# Patient Record
Sex: MALE | Race: WHITE | Employment: OTHER | URBAN - METROPOLITAN AREA
[De-identification: names, ages, dates, MRNs, and addresses within clinical notes are randomized per-mention and may not be internally consistent; named-entity substitution may affect disease eponyms.]

---

## 2017-01-03 ENCOUNTER — OFFICE VISIT (OUTPATIENT)
Dept: FAMILY MEDICINE CLINIC | Age: 67
End: 2017-01-03

## 2017-01-03 VITALS
HEART RATE: 55 BPM | OXYGEN SATURATION: 94 % | SYSTOLIC BLOOD PRESSURE: 127 MMHG | TEMPERATURE: 98 F | WEIGHT: 264 LBS | RESPIRATION RATE: 16 BRPM | HEIGHT: 75 IN | BODY MASS INDEX: 32.83 KG/M2 | DIASTOLIC BLOOD PRESSURE: 78 MMHG

## 2017-01-03 DIAGNOSIS — J06.9 ACUTE URI: Primary | ICD-10-CM

## 2017-01-03 RX ORDER — BENZONATATE 200 MG/1
200 CAPSULE ORAL
Qty: 20 CAP | Refills: 0 | Status: SHIPPED | OUTPATIENT
Start: 2017-01-03 | End: 2017-01-03 | Stop reason: SDUPTHER

## 2017-01-03 RX ORDER — AMOXICILLIN AND CLAVULANATE POTASSIUM 875; 125 MG/1; MG/1
1 TABLET, FILM COATED ORAL 2 TIMES DAILY
Qty: 14 TAB | Refills: 0 | Status: SHIPPED | OUTPATIENT
Start: 2017-01-03 | End: 2017-01-03 | Stop reason: SDUPTHER

## 2017-01-03 RX ORDER — AMOXICILLIN AND CLAVULANATE POTASSIUM 875; 125 MG/1; MG/1
1 TABLET, FILM COATED ORAL 2 TIMES DAILY
Qty: 20 TAB | Refills: 0 | Status: SHIPPED | OUTPATIENT
Start: 2017-01-03 | End: 2017-01-13

## 2017-01-03 RX ORDER — BENZONATATE 200 MG/1
200 CAPSULE ORAL
Qty: 20 CAP | Refills: 0 | Status: SHIPPED | OUTPATIENT
Start: 2017-01-03 | End: 2017-01-16 | Stop reason: ALTCHOICE

## 2017-01-03 NOTE — PROGRESS NOTES
HISTORY OF PRESENT ILLNESS  Nedra Morales is a 74 y.o. male. HPI  C/o head congestion/pressure, postnasal drip,  slight sore throat, started over a week ago, took zyrtec otc, not any better  Review of Systems   Constitutional: Negative for chills and fever. HENT: Negative for ear pain. Respiratory: Positive for cough. Negative for sputum production, shortness of breath and wheezing. Cardiovascular: Negative for chest pain. Neurological: Positive for headaches. Physical Exam   Constitutional: He appears well-developed and well-nourished. HENT:   Right Ear: Tympanic membrane and external ear normal.   Left Ear: Tympanic membrane and external ear normal.   Nose: Mucosal edema present. Right sinus exhibits maxillary sinus tenderness. Left sinus exhibits maxillary sinus tenderness. Mouth/Throat: Posterior oropharyngeal erythema present. No oropharyngeal exudate. Neck: Neck supple. Cardiovascular: Normal rate, regular rhythm and normal heart sounds. Pulmonary/Chest: Effort normal and breath sounds normal. He has no rales. Lymphadenopathy:     He has no cervical adenopathy. Vitals reviewed. ASSESSMENT and PLAN  Micah Oneill was seen today for cough and chest congestion. Diagnoses and all orders for this visit:    Acute URI with sinusitis  -     amoxicillin-clavulanate (AUGMENTIN) 875-125 mg per tablet; Take 1 Tab by mouth two (2) times a day for 10 days. -     benzonatate (TESSALON) 200 mg capsule; Take 1 Cap by mouth three (3) times daily as needed for Cough.   - continue zyrtec 10 mg po daily

## 2017-01-03 NOTE — PROGRESS NOTES
Chief Complaint   Patient presents with    Cough    Chest Congestion       1. Have you been to the ER, urgent care clinic since your last visit? Hospitalized since your last visit? No    2. Have you seen or consulted any other health care providers outside of the [address] [place] [address] since your last visit? Include any pap smears or colon screening.  Yes When: 12/7/16 Podiatry

## 2017-01-10 ENCOUNTER — TELEPHONE (OUTPATIENT)
Dept: FAMILY MEDICINE CLINIC | Age: 67
End: 2017-01-10

## 2017-01-10 NOTE — TELEPHONE ENCOUNTER
Terri Brennan with 21  called [phone] stating they received last office visit note from July 2016, requesting updated note. Advised pt has been seen for sick visit, but not follow-up.  Fer stated they will call pt to scheduled f/u with PCP

## 2017-01-16 ENCOUNTER — HOSPITAL ENCOUNTER (OUTPATIENT)
Dept: LAB | Age: 67
Discharge: HOME | End: 2017-01-16
Payer: MEDICARE

## 2017-01-16 ENCOUNTER — OFFICE VISIT (OUTPATIENT)
Dept: FAMILY MEDICINE CLINIC | Age: 67
End: 2017-01-16

## 2017-01-16 VITALS
RESPIRATION RATE: 16 BRPM | TEMPERATURE: 97.3 F | WEIGHT: 264 LBS | BODY MASS INDEX: 32.83 KG/M2 | HEIGHT: 75 IN | DIASTOLIC BLOOD PRESSURE: 77 MMHG | HEART RATE: 55 BPM | OXYGEN SATURATION: 96 % | SYSTOLIC BLOOD PRESSURE: 125 MMHG

## 2017-01-16 DIAGNOSIS — Z51.81 MEDICATION MONITORING ENCOUNTER: ICD-10-CM

## 2017-01-16 DIAGNOSIS — E11.42 DIABETIC POLYNEUROPATHY ASSOCIATED WITH TYPE 2 DIABETES MELLITUS (HCC): ICD-10-CM

## 2017-01-16 DIAGNOSIS — G47.33 OSA (OBSTRUCTIVE SLEEP APNEA): Chronic | ICD-10-CM

## 2017-01-16 DIAGNOSIS — E78.00 HYPERCHOLESTEROLEMIA: Chronic | ICD-10-CM

## 2017-01-16 DIAGNOSIS — E11.9 NON-INSULIN DEPENDENT TYPE 2 DIABETES MELLITUS (HCC): Chronic | ICD-10-CM

## 2017-01-16 DIAGNOSIS — E11.9 NON-INSULIN DEPENDENT TYPE 2 DIABETES MELLITUS (HCC): Primary | Chronic | ICD-10-CM

## 2017-01-16 DIAGNOSIS — I10 ESSENTIAL HYPERTENSION: Chronic | ICD-10-CM

## 2017-01-16 DIAGNOSIS — R94.4 DECREASED GFR: ICD-10-CM

## 2017-01-16 LAB
ALBUMIN SERPL BCP-MCNC: 4 G/DL (ref 3.4–5)
ALBUMIN/GLOB SERPL: 1.3 {RATIO} (ref 0.8–1.7)
ALP SERPL-CCNC: 79 U/L (ref 45–117)
ALT SERPL-CCNC: 24 U/L (ref 16–61)
ANION GAP BLD CALC-SCNC: 8 MMOL/L (ref 3–18)
APPEARANCE UR: CLEAR
AST SERPL W P-5'-P-CCNC: 13 U/L (ref 15–37)
BILIRUB SERPL-MCNC: 0.6 MG/DL (ref 0.2–1)
BILIRUB UR QL: NEGATIVE
BUN SERPL-MCNC: 17 MG/DL (ref 7–18)
BUN/CREAT SERPL: 16 (ref 12–20)
CALCIUM SERPL-MCNC: 9.1 MG/DL (ref 8.5–10.1)
CHLORIDE SERPL-SCNC: 101 MMOL/L (ref 100–108)
CO2 SERPL-SCNC: 29 MMOL/L (ref 21–32)
COLOR UR: YELLOW
CREAT SERPL-MCNC: 1.06 MG/DL (ref 0.6–1.3)
ERYTHROCYTE [DISTWIDTH] IN BLOOD BY AUTOMATED COUNT: 13.7 % (ref 11.6–14.5)
EST. AVERAGE GLUCOSE BLD GHB EST-MCNC: 163 MG/DL
GLOBULIN SER CALC-MCNC: 3.2 G/DL (ref 2–4)
GLUCOSE SERPL-MCNC: 69 MG/DL (ref 74–99)
GLUCOSE UR STRIP.AUTO-MCNC: NEGATIVE MG/DL
HBA1C MFR BLD: 7.3 % (ref 4.2–5.6)
HCT VFR BLD AUTO: 42.3 % (ref 36–48)
HGB BLD-MCNC: 14.3 G/DL (ref 13–16)
HGB UR QL STRIP: NEGATIVE
KETONES UR QL STRIP.AUTO: NEGATIVE MG/DL
LEUKOCYTE ESTERASE UR QL STRIP.AUTO: NEGATIVE
MCH RBC QN AUTO: 30.6 PG (ref 24–34)
MCHC RBC AUTO-ENTMCNC: 33.8 G/DL (ref 31–37)
MCV RBC AUTO: 90.4 FL (ref 74–97)
NITRITE UR QL STRIP.AUTO: NEGATIVE
PH UR STRIP: 5 [PH] (ref 5–8)
PLATELET # BLD AUTO: 307 K/UL (ref 135–420)
PMV BLD AUTO: 9.8 FL (ref 9.2–11.8)
POTASSIUM SERPL-SCNC: 3.9 MMOL/L (ref 3.5–5.5)
PROT SERPL-MCNC: 7.2 G/DL (ref 6.4–8.2)
PROT UR STRIP-MCNC: NEGATIVE MG/DL
RBC # BLD AUTO: 4.68 M/UL (ref 4.7–5.5)
SODIUM SERPL-SCNC: 138 MMOL/L (ref 136–145)
SP GR UR REFRACTOMETRY: 1.02 (ref 1–1.03)
UROBILINOGEN UR QL STRIP.AUTO: 0.2 EU/DL (ref 0.2–1)
WBC # BLD AUTO: 9 K/UL (ref 4.6–13.2)

## 2017-01-16 PROCEDURE — 80053 COMPREHEN METABOLIC PANEL: CPT | Performed by: INTERNAL MEDICINE

## 2017-01-16 PROCEDURE — 36415 COLL VENOUS BLD VENIPUNCTURE: CPT | Performed by: INTERNAL MEDICINE

## 2017-01-16 PROCEDURE — 81003 URINALYSIS AUTO W/O SCOPE: CPT | Performed by: INTERNAL MEDICINE

## 2017-01-16 PROCEDURE — 83036 HEMOGLOBIN GLYCOSYLATED A1C: CPT | Performed by: INTERNAL MEDICINE

## 2017-01-16 PROCEDURE — 85027 COMPLETE CBC AUTOMATED: CPT | Performed by: INTERNAL MEDICINE

## 2017-01-16 PROCEDURE — 82043 UR ALBUMIN QUANTITATIVE: CPT | Performed by: INTERNAL MEDICINE

## 2017-01-16 NOTE — PATIENT INSTRUCTIONS
STAY UP TO DATE WITH YOUR PREVENTIVE HEALTH:     Please review the following recommendations and if you are not sure that your healthcare is up to date, ask your provider at your next scheduled office visit. -- Yearly preventive visits (sometimes called \"physicals\") are recommended for all adults. Medicare and Medicaid do not pay for physicals. Medicare covers a yearly wellness visit that differs in many ways from a traditional physical, but is an opportunity to make sure you are maximizing the preventive services that will be covered by Medicare. Each insurance carrier will have different regulations about what services may be covered, so be sure to talk with your insurance provider before scheduling a visit. -- Colon cancer screening is recommended for all patients 48 and older with either colonoscopy (interval will vary depending on results) or yearly stool testing.      -- Mammogram is recommended every 2 years for women ages 36 to 76. -- Pap smear is recommended every 3-5 years for women aged 24 to 72, unless your cervix has been surgically removed for benign reasons. -- Flu shot is recommended every fall for adults of all ages. -- Prevnar 15 is recommended at the age of 72 for all adults. -- Pneumovax is recommended one year after Prevnar 13 for all adults, but earlier if you have a history of chronic health problems (including diabetes and asthma) or smoking. -- Tetanus boosters are recommended every 10 years for adults of all ages. Medicare and Medicaid do not cover tetanus as a preventive booster, but will pay for patients to receive a booster in the event of certain injuries. INSTRUCTIONS AFTER YOUR VISIT TODAY 1/16/2017      -- Prevnar 13 script filled at [address]  (fasting means nothing by mouth except medications with water or black coffee for at least 10 hours) were ordered to be completed at your next earliest convenience.    -- Our lab does not require a scheduled appointment. You can return to the office during lab hours within the next 1-2 weeks to complete your fasting labs. -- Lab hours at [facility] are 7a-3:00pm Monday-Friday. Please check the holiday closures below to make sure the office is open on the day you plan to return. LAB RESULTS can be obtained by logging into your [employer] account. If you need assistance with accessing your account, you can call the [employer] [place], [place] [address] at #[phone]. TESTING RESULTS   -- Lab results may become available for your review on [employer] before your provider has an opportunity to write you a note about results. Review of routine lab results will generally be done in 10-14 days. Please save your inquiries about results until your provider has had time to review them. -- If you have testing done outside of the office, please call to let us know the date and location that your testing was completed. Results can often be obtained faster if an inquiry is run. MEDICATION REFILLS      -- Medication refills are to be requested during regular office hours. The after hours physician on call is not required to respond to calls about medication refills. It is your responsibility to keep track of when you may be running out of medication and to place refill requests at least 3 business days prior. -- Controlled substance refills (medications that require printed prescriptions for monitoring of use per [facility] guidelines) must be picked up in the office and per medical group policy will require a scheduled office visit with the provider. Calling the after hours answering service to request a controlled substance represents a violation of [facility] controlled substance policy. [facility]      Scheduling appointments can be done at the  or by calling [phone] and selecting option #1.       CLINICAL QUESTIONS     During office hours please call [phone] and select option #4 to speak with Dr. Sabina Epstein clinical staff. You may need to leave a voicemail (be sure to clearly state your first and last name and date of birth in the message) as clinical staff are generally involved in patient care during office hours. Clinical questions will need to be reviewed by clinical staff with appropriate training. Most medical questions, new medication requests, referral requests, and completion of medical or insurance forms require a scheduled office visit. After hours questions that cannot wait until the next business day will be directed to the on-call physician. Please save any non-urgent questions until the next business day. Refills will not be called in on evenings or weekends. HOLIDAY CLOSURES:     The office is closed on six major holidays each year:  New Year's Day, July 4th, Memorial Day, Labor Day, Thanksgiving, and [place] Day. Lab and X-ray services are not available on those days.

## 2017-01-16 NOTE — PROGRESS NOTES
1. Have you been to the ER, urgent care clinic since your last visit? Hospitalized since your last visit? No    2. Have you seen or consulted any other health care providers outside of the [address] [place] [address] since your last visit? Include any pap smears or colon screening.  No

## 2017-01-16 NOTE — MR AVS SNAPSHOT
Visit Information Date & Time Provider Department Dept. Phone Encounter #  
 1/16/2017  2:00 PM Vanita Morrow, [employer] [phone] [id number] Follow-up Instructions Return in about 6 months (around 7/16/2017) for 30 minute visit. Upcoming Health Maintenance Date Due DTaP/Tdap/Td series (1 - Tdap) 10/6/1969 Pneumococcal 65+ Low/Medium Risk (1 of 2 - PCV13) 10/6/2013 INFLUENZA AGE 9 TO ADULT 8/1/2016 MICROALBUMIN Q1 9/2/2016 EYE EXAM RETINAL OR DILATED Q1 9/25/2016 HEMOGLOBIN A1C Q6M 1/5/2017 MEDICARE YEARLY EXAM 4/2/2017 LIPID PANEL Q1 9/21/2017 GLAUCOMA SCREENING Q2Y 9/25/2017 FOOT EXAM Q1 12/14/2017 COLONOSCOPY 5/4/2025 Allergies as of 1/16/2017  Review Complete On: 1/16/2017 By: Vanita Morrow MD  
  
 Severity Noted Reaction Type Reactions Zocor [Simvastatin] Medium 11/01/2013   Side Effect Myalgia Tolerating high dose Atorvastatin Current Immunizations  Reviewed on 1/16/2017 Name Date Influenza Vaccine 9/2/2015  9:29 AM, 10/8/2014, 10/2/2013, 9/24/2012 Zoster Vaccine, Live 9/24/2012 Reviewed by Vanita Morrow MD on 1/16/2017 at  2:04 PM  
 Reviewed by Vanita Morrow MD on 1/16/2017 at  2:05 PM  
You Were Diagnosed With   
  
 Codes Comments Non-insulin dependent type 2 diabetes mellitus ([facility]    -  Primary ICD-10-CM: E11.9 ICD-9-CM: 250.00 Essential hypertension     ICD-10-CM: I10 
ICD-9-CM: 401.9 NELSON (obstructive sleep apnea)     ICD-10-CM: G47.33 
ICD-9-CM: 327.23 Hypercholesterolemia     ICD-10-CM: E78.00 ICD-9-CM: 272.0 Decreased GFR     ICD-10-CM: R94.4 ICD-9-CM: 794.4 Medication monitoring encounter     ICD-10-CM: Z51.81 
ICD-9-CM: V58.83  Diabetic polyneuropathy associated with type 2 diabetes mellitus ([facility] 75.)     ICD-10-CM: E11.42 
 ICD-9-CM: 250.60, 357.2 Uncontrolled type 2 diabetes mellitus without complication, without long-term current use of insulin ([facility] 75.)     ICD-10-CM: E11.65 ICD-9-CM: 250.02 Vitals BP Pulse Temp Resp Height(growth percentile) Weight(growth percentile) 125/77 (BP 1 Location: Right arm, BP Patient Position: Sitting) (!) 55 97.3 °F (36.3 °C) (Oral) 16 6' 3\" (1.905 m) 264 lb (119.7 kg) SpO2 BMI Smoking Status 96% 33 kg/m2 Former Smoker Vitals History BMI and BSA Data Body Mass Index Body Surface Area  
 33 kg/m 2 2.52 m 2 Preferred Pharmacy Pharmacy Name Phone [facility]ung [facility], [address] [phone] Your Updated Medication List  
  
   
This list is accurate as of: 1/16/17  2:18 PM.  Always use your most recent med list.  
  
  
  
  
 ALPRAZolam 0.25 mg tablet Commonly known as:  Justina Furnace Take 1 Tab by mouth three (3) times daily as needed for Anxiety. Max Daily Amount: 0.75 mg. Indications: ANXIETY  
  
 aspirin 81 mg chewable tablet Take 81 mg by Mouth Once a Day. atorvastatin 80 mg tablet Commonly known as:  LIPITOR Take 1 Tab by mouth daily. Take 80 mg by Mouth Once a Day. Blood-Glucose Meter Misc  
BG meter for monitoring BG - ICD 9 250.00 - Diabetes mellitus type 2 uncontrolled - BID testing. carboxymethylcellulose sodium 1 % Dlgl Apply  to eye. Diabetic Supplies, [facility]. Mis Commonly known as:  Lancing Device with Lancets DM2 uncontrolled - BG testing BID. fexofenadine-pseudoephedrine 180-240 mg per tablet Commonly known as:  ALLEGRA-D 24 HOUR Take 1 Tab by mouth daily. glipiZIDE 10 mg tablet Commonly known as:  Selelizabeth Bajwaler Take 1 Tab by mouth two (2) times a day. * glucose blood VI test strips strip Commonly known as:  blood glucose test  
Uncontrolled DM2. BID testing. * glucose blood VI test strips strip Commonly known as:  FREESTYLE LITE STRIPS  
ICD10 E11.65 - DM2 daily BG testing. Lancets Misc Uncontrolled DM2. BID testing. Disp 2 boxes of 100 each with 3 refills for 90 day supply. LEVITRA 10 mg tablet Generic drug:  vardenafil Take 10 mg by mouth as needed. meloxicam 7.5 mg tablet Commonly known as:  MOBIC Take 1 Tab by mouth daily as needed for Pain (arthritis). metFORMIN  mg tablet Commonly known as:  GLUCOPHAGE XR Take 1 Tab by mouth two (2) times a day. mometasone 50 mcg/actuation nasal spray Commonly known as:  NASONEX  
2 Sprays by Both Nostrils route daily. naproxen 500 mg tablet Commonly known as:  NAPROSYN Take 1 Tab by Mouth Twice Daily. olopatadine 0.1 % ophthalmic solution Commonly known as:  PATANOL Administer 2 Drops to both eyes two (2) times daily as needed for Allergies. omega-3 fatty acids-vitamin e 1,000 mg Cap Commonly known as:  FISH OIL Take 1 Cap by mouth two (2) times a day. omeprazole 20 mg capsule Commonly known as:  PRILOSEC Take 1 Cap by mouth daily. OTHER Heel cup  Indications: heel pain  
  
 oxyCODONE-acetaminophen 5-325 mg per tablet Commonly known as:  PERCOCET Take 1 Tab by mouth every six (6) hours as needed for Pain. Max Daily Amount: 4 Tabs. pneumococcal 13 yuan conj dip 0.5 mL Syrg injection Commonly known as:  PREVNAR-13  
0.5 mL by IntraMUSCular route once for 1 dose. pramipexole 0.5 mg tablet Commonly known as:  MIRAPEX Take 1 Tab by mouth three (3) times daily as needed. Indications: RESTLESS LEGS SYNDROME  
  
 telmisartan-hydroCHLOROthiazide 40-12.5 mg per tablet Commonly known as:  MICARDIS HCT Take 1 Tab by mouth daily. triamcinolone 0.5 % topical cream  
Commonly known as:  ARISTOCORT Apply  to affected area two (2) times a day. use thin layer to affected area * Notice:   This list has 2 medication(s) that are the same as other medications prescribed for you. Read the directions carefully, and ask your doctor or other care provider to review them with you. Prescriptions Printed Refills  
 pneumococcal 13 yuan conj dip (PREVNAR-13) 0.5 mL syrg injection 0 Si.5 mL by IntraMUSCular route once for 1 dose. Class: Print Route: IntraMUSCular  
 glucose blood VI test strips (FREESTYLE LITE STRIPS) strip 3 Sig: ICD10 E11.65 - DM2 daily BG testing. Class: Print We Performed the Following  DIABETES FOOT EXAM [HM7 Custom] Comments:  
 Diabetic foot exam performed today. REFERRAL TO OPHTHALMOLOGY [REF57 Custom] Comments:  
 Please evaluate patient for diabetic eye exam. ([facility] staff - place in folder for [facility] for Leon Martines) Follow-up Instructions Return in about 6 months (around 2017) for 30 minute visit. To-Do List   
 2017 Lab:  CBC W/O DIFF   
  
 2017 Lab:  HEMOGLOBIN A1C WITH EAG   
  
 2017 Lab:  METABOLIC PANEL, COMPREHENSIVE   
  
 2017 Lab:  MICROALBUMIN, UR, RAND W/ MICROALBUMIN/CREA RATIO   
  
 2017 Lab:  URINALYSIS W/ RFLX MICROSCOPIC Referral Information Referral ID Referred By Referred To  
  
 [id number] [facility], [address] [place] [address], [address] Phone: [phone] Fax: [phone] Visits Status Start Date End Date 1 New Request 17 If your referral has a status of pending review or denied, additional information will be sent to support the outcome of this decision. Patient Instructions STAY UP TO DATE WITH YOUR PREVENTIVE HEALTH:    
Please review the following recommendations and if you are not sure that your healthcare is up to date, ask your provider at your next scheduled office visit.   
 
-- Yearly preventive visits (sometimes called \"physicals\") are recommended for all adults. Medicare and Medicaid do not pay for physicals. Medicare covers a yearly wellness visit that differs in many ways from a traditional physical, but is an opportunity to make sure you are maximizing the preventive services that will be covered by Medicare. Each insurance carrier will have different regulations about what services may be covered, so be sure to talk with your insurance provider before scheduling a visit. -- Colon cancer screening is recommended for all patients 48 and older with either colonoscopy (interval will vary depending on results) or yearly stool testing.   
 
-- Mammogram is recommended every 2 years for women ages 36 to 76. -- Pap smear is recommended every 3-5 years for women aged 24 to 72, unless your cervix has been surgically removed for benign reasons. -- Flu shot is recommended every fall for adults of all ages. -- Prevnar 15 is recommended at the age of 72 for all adults. -- Pneumovax is recommended one year after Prevnar 13 for all adults, but earlier if you have a history of chronic health problems (including diabetes and asthma) or smoking. -- Tetanus boosters are recommended every 10 years for adults of all ages. Medicare and Medicaid do not cover tetanus as a preventive booster, but will pay for patients to receive a booster in the event of certain injuries. INSTRUCTIONS AFTER YOUR VISIT TODAY 1/16/2017   
 
-- Prevnar 13 script filled at pharmacy -- FASTING LABS  (fasting means nothing by mouth except medications with water or black coffee for at least 10 hours) were ordered to be completed at your next earliest convenience. -- Our lab does not require a scheduled appointment. You can return to the office during lab hours within the next 1-2 weeks to complete your fasting labs. -- Lab hours at [facility] are 7a-3:00pm Monday-Friday.   Please check the holiday closures below to make sure the office is open on the day you plan to return. LAB RESULTS can be obtained by logging into your [employer] account. If you need assistance with accessing your account, you can call the [address] [place] [address] at #[phone]. TESTING RESULTS  
-- Lab results may become available for your review on [employer] before your provider has an opportunity to write you a note about results. Review of routine lab results will generally be done in 10-14 days. Please save your inquiries about results until your provider has had time to review them. -- If you have testing done outside of the office, please call to let us know the date and location that your testing was completed. Results can often be obtained faster if an inquiry is run. MEDICATION REFILLS   
 
-- Medication refills are to be requested during regular office hours. The after hours physician on call is not required to respond to calls about medication refills. It is your responsibility to keep track of when you may be running out of medication and to place refill requests at least 3 business days prior. -- Controlled substance refills (medications that require printed prescriptions for monitoring of use per [facility] guidelines) must be picked up in the office and per medical group policy will require a scheduled office visit with the provider. Calling the after hours answering service to request a controlled substance represents a violation of [facility] controlled substance policy. [place] Scheduling appointments can be done at the  or by calling [phone] and selecting option #1. CLINICAL QUESTIONS During office hours please call [phone] and select option #4 to speak with Dr. Tonja Cabral clinical staff. You may need to leave a voicemail (be sure to clearly state your first and last name and date of birth in the message) as clinical staff are generally involved in patient care during office hours. Clinical questions will need to be reviewed by clinical staff with appropriate training. Most medical questions, new medication requests, referral requests, and completion of medical or insurance forms require a scheduled office visit. After hours questions that cannot wait until the next business day will be directed to the on-call physician. Please save any non-urgent questions until the next business day. Refills will not be called in on evenings or weekends. HOLIDAY CLOSURES:  
 
The office is closed on six major holidays each year:  New Year's Day, July 4th, Memorial Day, Labor Day, Thanksgiving, and [place] Day. Lab and X-ray services are not available on those days. Introducing [facility]! Cole Martínez introduces [employer] patient portal. Now you can access parts of your medical record, email your doctor's office, and request medication refills online. 1. In your internet browser, go to [email or web address] 2. Click on the First Time User? Click Here link in the Sign In box. You will see the New Member Sign Up page. 3. Enter your [employer] Access Code exactly as it appears below. You will not need to use this code after youve completed the sign-up process. If you do not sign up before the expiration date, you must request a new code. · [employer] Access Code: [email or web address] Expires: 4/16/2017  2:18 PM 
 
4. Enter the last four digits of your Social Security Number (xxxx) and Date of Birth (mm/dd/yyyy) as indicated and click Submit. You will be taken to the next sign-up page. 5. Create a [employer] ID. This will be your [employer] login ID and cannot be changed, so think of one that is secure and easy to remember. 6. Create a [employer] password. You can change your password at any time. 7. Enter your Password Reset Question and Answer. This can be used at a later time if you forget your password. 8. Enter your e-mail address. You will receive e-mail notification when new information is available in [address] 9. Click Sign Up. You can now view and download portions of your medical record. 10. Click the Download Summary menu link to download a portable copy of your medical information. If you have questions, please visit the Frequently Asked Questions section of the [employer] website. Remember, [employer] is NOT to be used for urgent needs. For medical emergencies, dial 911. Now available from your iPhone and Android! Please provide this summary of care documentation to your next provider. Your primary care clinician is listed as Sil Junior. If you have any questions after today's visit, please call [phone].

## 2017-01-16 NOTE — PROGRESS NOTES
PRE-VISIT PLANNING:       Najma De La Vega is a patient of Ya Mclain MD who is scheduled for an office visit with Ya Mclain MD  for follow up - [facility] requested most recent notes, unclear which product he is being evaluated for. Encounter opened prior to visit to complete pre-visit planning, update and review pertinent sections in the chart. Last office visit with PCP was 16. Seen by NP Ashley 16 for heel pain and Dr. Kian Durand 1/3/17 for sinusitis (placed on augmentin). Rooming Nurse Items:  -- Offer high dose flu shot  -- Release for last eye exam note (previously seen by Dr. Lotus Holcomb)    Pending items for provider to review with patient:  -- Prevnar 13 from pharmacy, PPSV23 1 year later  -- Due for labs and urine    Health Maintenance Due   Topic Date Due    DTaP/Tdap/Td series (1 - Tdap) 10/06/1969    Pneumococcal 65+ Low/Medium Risk (1 of 2 - PCV13) 10/06/2013    INFLUENZA AGE 9 TO ADULT  2016    MICROALBUMIN Q1  2016    EYE EXAM RETINAL OR DILATED Q1  2016    HEMOGLOBIN A1C Q6M  2017          Internal Medicine/Primary Care  Progress Note  [address] [place] [facility] [place] [address] [address], 8 [facility], [address] [place] [address]  Phone [phone]  Fax [phone]    Date of Service:  2017   Patient:  Najma De La Vega  Patient :  1950    History, Discussion & Plan     Chief Complaint   Patient presents with    Other     [facility] eval.        Najma De La Vega is a pleasant 74 y.o. male patient who was seen today for follow up visit. He  has a past medical history of Abrasion of skin (2016); Acid reflux; ACP (advance care planning); Aneurysm ([address] Aortic root dilation ([address] Arthritis; Avascular necrosis of bones of both hips ([address] (16); Avascular necrosis of femoral head ([address] (2015);  Back pain, lumbosacral; Back pain, thoracic; BMI 33.0-33.9,adult; BMI 33.0-33.9,adult; Bone lesion left femur; Carpal tunnel syndrome, bilateral; Cervical spondylosis; Chronic pain; Compression fracture of thoracic vertebra (HCC) (1/2016); DDD (degenerative disc disease), lumbosacral; Dental disorder; Diabetes ([address] Elevated transaminase level; Essential hypertension; GERD (gastroesophageal reflux disease); Herniated nucleus pulposus; History of poliomyelitis; History of shingles (7/2013); Hypercholesterolemia; Hypogonadism male; Impingement syndrome of left shoulder (8/2013); Internal hemorrhoids; Long term (current) use of anticoagulants; NELSON (obstructive sleep apnea); Restless leg syndrome; S/P AAA repair; Seasonal allergic reaction; and Type 2 diabetes mellitus, controlled ([address] He also has no past medical history of Anemia; Arrhythmia; Asthma; Autoimmune disease ([address] CAD (coronary artery disease); Calculus of kidney; Cancer ([address] Chronic kidney disease; Chronic obstructive pulmonary disease ([address] Congestive heart failure, unspecified; Depression; Headache; History of abuse; Liver disease; Psychotic disorder; PUD (peptic ulcer disease); Seizures ([address] Stool color black; Stroke ([address] Thromboembolus ([address] Thyroid disease; or Trauma. Today patient reports he saw Dr. Manav Pillai (podiatry) who ordered him orthotic inserts for plantar fasciitis and also ordered diabetic shoes for him. Left heel pain is much better after getting steroid injections and using the orthotic Dr. Manav Pillai recommended. Still hurts some, but he is no longer limping. Referred him to [facility], but insurance requires updated diabetic care plan, foot exam and testing. He is checking BGs 3-4 times a week most of the time, has been gone hunting quite a bit this fall and early winter. Not eating a lot of sweets, but may have been eating more fried foods. Usually his home BGs range 70s up to 180s. He sometimes does have some hypoglycemic symptoms and a few times his BG was down into mid-60s. Resolved with eating a piece of candy.       Cough and congestion have resolved. No longer taking tessalon. Still has some minor aches and pains that he associates with his fall through the ceiling a year ago. Very rarely takes percocet, perhaps 3-4 tablets total in the last 6+ months. He very occasionally has anxiety that occurs out of the blue, no identifiable trigger. He takes a 0.25 mg Xanax when this occurs. He states he still has most of his tablets left from a script given to him over 6 months ago.       Review of Systems (positives in bold)   CONST:   fevers, chills, rigors, malaise, night sweats, fatigue    unintentional weight change, appetite change  NEURO:   headaches, auras, vision changes, seizures,     dizziness, lightheadeness, orthostasis, loss of consciousness, confusion    numbness/tingling/sensory change, focal weakness, new gait difficulty/imbalance  HEENT:  itchy eyes, runny eyes, red eyes, eye watering or discharge,     vision changes, light sensitivity, double vision    ear pain, ear pressure/popping, ear discharge/drainage, hearing change    sneezing, runny nose, nasal congestion, postnasal drip,     nosebleeds, altered sense of smell    sore throat, dry mouth,voice change, hoarse voice,      jaw pain, jaw popping, toothache, dysgeusia    difficulty swallowing, oral ulcers or canker sores  CV:      chest pain, palpitations, chest wall pain, orthopnea, PND, edema  PULM:  SOB, wheezing, cough, sputum production, hemoptysis  GI:             nausea, vomiting, dry heaves, abdominal pain,     diarrhea, constipation, greasy stools, blood in stool, pale stools  MS:      focal muscle pain, myalgias, soft tissue swelling,    focal joint pain, diffuse joint aches, joint swelling/redness,     decreased ROM, joint instability, joint crepitus    neck pain, back pain  SKIN:        rashes, itching, vesicles, pustules, drainage, cuts or sores, nonhealing wounds,   acne, rosacea, new or changing skin growths, skin tags, warts  ALLERGY: seasonal allergies, itchy eyes, watery eyes, red eyes,     itchy ears, ear pain/pressure, ear popping,     runny nose, sneezing, postnasal drip, sore throat  HEME:  easy bleeding/bruising, bleeding from gums, history of DVT or PE, history of ICH  PSYCH: abnormal mood swings, anxiety, insomnia, hallucinations, anhedonia,       depression, suicidal ideation, homicidal ideation, feelings of hopelessness    substance dependence or abuse, disordered eating, irritability,    difficulty focusing, distractibility, obsessions, compulsions, repetitious behaviors     Body mass index is 33 kg/(m^2). Discussed the patient's above normal BMI with him. I have recommended the following interventions and follow up:  Weight loss from baseline weight. 30 minutes of cardiovascular exercise daily, reduction in simple carbohydrates, increase in dietary fiber, adequate water intake to stay hydrated/keep urine clear to light yellow. Follow up at next OV. Diagnoses & Orders:   Salome Roberson was seen today for follow up. Compliant with metformin  mg BID and Glipizide 10 mg BID. Labs today to assess diabetic control/medication monitoring. Foot exam demonstrates diabetic neuropathy. He is not fasting, will update lipids at next visit. Will forward notes and lab results (once available) to Dr. Elsie Jiménez and to [facility]. BP is well controlled. No change to medications. Follow up in 6 months for 30 min visit, sooner PRN. ICD-10-CM ICD-9-CM    1. Non-insulin dependent type 2 diabetes mellitus (HCC) E11.9 250.00 REFERRAL TO OPHTHALMOLOGY      HEMOGLOBIN A1C WITH EAG      METABOLIC PANEL, COMPREHENSIVE      CBC W/O DIFF      MICROALBUMIN, UR, RAND W/ MICROALBUMIN/CREA RATIO      URINALYSIS W/ RFLX MICROSCOPIC       DIABETES FOOT EXAM   2. Essential hypertension R93 072.0 METABOLIC PANEL, COMPREHENSIVE      CBC W/O DIFF      URINALYSIS W/ RFLX MICROSCOPIC   3.  NELSON (obstructive sleep apnea) U90.58 578.86 METABOLIC PANEL, COMPREHENSIVE      CBC W/O DIFF   4. Hypercholesterolemia S14.38 673.3 METABOLIC PANEL, COMPREHENSIVE   5. Decreased GFR M42.7 455.4 METABOLIC PANEL, COMPREHENSIVE      URINALYSIS W/ RFLX MICROSCOPIC   6. Medication monitoring encounter Z51.81 V58.83 REFERRAL TO OPHTHALMOLOGY      HEMOGLOBIN A1C WITH EAG      METABOLIC PANEL, COMPREHENSIVE      CBC W/O DIFF      MICROALBUMIN, UR, RAND W/ MICROALBUMIN/CREA RATIO      URINALYSIS W/ RFLX MICROSCOPIC   7. Diabetic polyneuropathy associated with type 2 diabetes mellitus (HCC) E11.42 250.60  DIABETES FOOT EXAM     357.2    8. Uncontrolled type 2 diabetes mellitus without complication, without long-term current use of insulin (HCC) E11.65 250.02        The patient states understanding/agreement with the treatment plan. All questions were answered. Jesika Silverio MD - Internal Medicine  1/16/2017, 9:02 AM    Patient Care Team:  Jesika Silverio MD as PCP - General (Internal Medicine)  Leighann Cates RN as Nurse Navigator  Marilou St MD as Consulting Provider (Orthopedic Surgery)  Reid Tobin MD (General Surgery)  Merilynn Opitz, MD (Vascular Surgery)  Frandy Singleton DO as Consulting Provider (Physical Medicine and Rehab)  Justine Rios MD as Consulting Provider (Neurosurgery)      Objective:       VS:    Visit Vitals    /77 (BP 1 Location: Right arm, BP Patient Position: Sitting)    Pulse (!) 55    Temp 97.3 °F (36.3 °C) (Oral)    Resp 16    Ht 6' 3\" (1.905 m)    Wt 264 lb (119.7 kg)    SpO2 96%    BMI 33 kg/m2       General:   Age appropriate male, seated comfortably in exam room chair    Appears well, well-nourished, well-groomed, conversant, alert, in no acute distress.      HEENT:  Normocephalic, atraumatic, MMM, PERRL, EOMI   Cardiovasc:   Regular rate and rhythm, no murmurs, no rubs, no gallops  Pulmonary:   Clear breath sounds bilaterally, good air movement,    No wheezing, no rales, no rhonchi, normal respiratory effort  Abdomen:   Abdomen soft, nontender, nondistended, NABS, no masses  Extremities:   No pitting dependent edema    No tenderness with palpation of calves    Warm and well-perfused at distal extremities  Neuro:   Alert, conversant, following commands, no focal deficits. Gait stable without assistive device  Skin:    No rashes noted. Psych:  Affect is pleasant, calm and interactive, facies and mood are congruent,     behavior normal and appropriate, thought process linear and logical     Memory appears to be normal throughout interview  Diabetic foot exam:     Left:    Vibratory sensation diminished    Proprioception normal   Filament test 4/6   Pulse DP: 2+ (normal)   Pulse PT: 2+ (normal)   Deformities: Mild - callus present left medial great toe, mild nail discoloration   No TTP left heel   Right:    Vibratory sensation diminished   Proprioception normal    Filament test 3/6   Pulse DP: 2+ (normal)   Pulse PT: 2+ (normal)   Deformities: Mild - callus present medial aspect right great toe, nail discoloration present      Additional History     Past Medical History   Diagnosis Date    Abrasion of skin 1/25/2016    Acid reflux     ACP (advance care planning)      has not yet filled out ACP, discussed 4/1/16    Aneurysm ([address]     Aortic root dilation ([address]      Borderline on echo June 8 2015 (done through [facility])    Arthritis     Avascular necrosis of bones of both hips ([address] 4/19/16     MRI L-spine revealed bialteral AVN femoral heads (MRI/CT dx, report submitted for scannin)    Avascular necrosis of femoral head ([address] 2/2015     BILATERAL, noted on CT 2/24/15 ([facility]) - Avascular necrosis involving both femoral heads. No femoral head collapse identified.     Back pain, lumbosacral     Back pain, thoracic     BMI 33.0-33.9,adult     BMI 33.0-33.9,adult     Bone lesion left femur      Asymptomatic, MRI planned for October    Carpal tunnel syndrome, bilateral     Cervical spondylosis      C4-C6  Chronic pain     Compression fracture of thoracic vertebra (HCC) 1/2016    DDD (degenerative disc disease), lumbosacral     Dental disorder     Diabetes ([facility]     Elevated transaminase level      AST and ALT     Essential hypertension     GERD (gastroesophageal reflux disease)     Herniated nucleus pulposus      Lumbar diskectomy 1980, C4/5 HNP, has gotten ESIs in the past    History of poliomyelitis     History of shingles 7/2013     Right flank/back, after having vaccine    Hypercholesterolemia     Hypogonadism male     Impingement syndrome of left shoulder 8/2013    Internal hemorrhoids     Long term (current) use of anticoagulants      baby asa    NELSON (obstructive sleep apnea)      Unable to tolerate CPAP    Restless leg syndrome     S/P AAA repair      Dr. Roselle Siemens following, q3 year duplex of aorta    Seasonal allergic reaction     Type 2 diabetes mellitus, controlled ([facility]      Past Surgical History   Procedure Laterality Date    Vascular surgery procedure unlist N/A      AAA repair with stent[facility]    Hx lumbar diskectomy N/A 1980    Hx orthopaedic       left shoulder     Social History   Substance Use Topics    Smoking status: Former Smoker     Types: Cigarettes, Pipe    Smokeless tobacco: Former User      Comment: Quit 43 years ago (2015), pipe 10+ years ago    Alcohol use 0.0 oz/week     0 Glasses of wine per week      Comment: Occasional beer, liquor or wine. No heavy use     Current Outpatient Prescriptions   Medication Sig    pneumococcal 13 yuan conj dip (PREVNAR-13) 0.5 mL syrg injection 0.5 mL by IntraMUSCular route once for 1 dose.  glucose blood VI test strips (FREESTYLE LITE STRIPS) strip ICD10 E11.65 - DM2 daily BG testing.  glipiZIDE (GLUCOTROL) 10 mg tablet Take 1 Tab by mouth two (2) times a day.  OTHER Heel cup  Indications: heel pain    mometasone (NASONEX) 50 mcg/actuation nasal spray 2 Sprays by Both Nostrils route daily.     telmisartan-hydrochlorothiazide (MICARDIS HCT) 40-12.5 mg per tablet Take 1 Tab by mouth daily.  metFORMIN ER (GLUCOPHAGE XR) 750 mg tablet Take 1 Tab by mouth two (2) times a day.  pramipexole (MIRAPEX) 0.5 mg tablet Take 1 Tab by mouth three (3) times daily as needed. Indications: RESTLESS LEGS SYNDROME    atorvastatin (LIPITOR) 80 mg tablet Take 1 Tab by mouth daily. Take 80 mg by Mouth Once a Day.  Lancets misc Uncontrolled DM2. BID testing. Disp 2 boxes of 100 each with 3 refills for 90 day supply.  glucose blood VI test strips (BLOOD GLUCOSE TEST) strip Uncontrolled DM2. BID testing.  Diabetic Supplies, [address] (LANCING DEVICE WITH LANCETS) misc DM2 uncontrolled - BG testing BID.  Blood-Glucose Meter misc BG meter for monitoring BG - ICD 9 250.00 - Diabetes mellitus type 2 uncontrolled - BID testing.  ALPRAZolam (XANAX) 0.25 mg tablet Take 1 Tab by mouth three (3) times daily as needed for Anxiety. Max Daily Amount: 0.75 mg. Indications: ANXIETY    olopatadine (PATANOL) 0.1 % ophthalmic solution Administer 2 Drops to both eyes two (2) times daily as needed for Allergies.  omeprazole (PRILOSEC) 20 mg capsule Take 1 Cap by mouth daily.  LEVITRA 10 mg tablet Take 10 mg by mouth as needed.  aspirin 81 mg chewable tablet Take 81 mg by Mouth Once a Day.  naproxen (NAPROSYN) 500 mg tablet Take 1 Tab by Mouth Twice Daily.  omega-3 fatty acids-vitamin e (FISH OIL) 1,000 mg cap Take 1 Cap by mouth two (2) times a day.  meloxicam (MOBIC) 7.5 mg tablet Take 1 Tab by mouth daily as needed for Pain (arthritis).  fexofenadine-pseudoephedrine (ALLEGRA-D 24 HOUR) 180-240 mg per tablet Take 1 Tab by mouth daily.  carboxymethylcellulose sodium 1 % DLGl Apply  to eye.  oxyCODONE-acetaminophen (PERCOCET) 5-325 mg per tablet Take 1 Tab by mouth every six (6) hours as needed for Pain. Max Daily Amount: 4 Tabs.     triamcinolone (ARISTOCORT) 0.5 % topical cream Apply  to affected area two (2) times a day. use thin layer to affected area     No current facility-administered medications for this visit. Allergies   Allergen Reactions    Zocor [Simvastatin] Myalgia     Tolerating high dose Atorvastatin            This document may have been created with the aid of dictation software. In spite of review and editing, text may contain errors, particularly phonetic errors.

## 2017-01-16 NOTE — LETTER
1/26/2017 1:59 PM 
 
Mr. Juan Mckenzie [address] [place] [id number] Dear Juan Mckenzie: 
 
Please find your most recent results below. Resulted Orders HEMOGLOBIN A1C WITH EAG Result Value Ref Range Hemoglobin A1c 7.3 (H) 4.2 - 5.6 % Comment:  
   (NOTE) HbA1C Interpretive Ranges <5.7              Normal 
5.7 - 6.4         Consider Prediabetes >6.5              Consider Diabetes Est. average glucose 163 mg/dL Comment:  
   (NOTE) The eAG should be interpreted with patient characteristics in mind  
since ethnicity, interindividual differences, red cell lifespan,  
variation in rates of glycation, etc. may affect the validity of the  
calculation. METABOLIC PANEL, COMPREHENSIVE Result Value Ref Range Sodium 138 136 - 145 mmol/L Potassium 3.9 3.5 - 5.5 mmol/L Chloride 101 100 - 108 mmol/L  
 CO2 29 21 - 32 mmol/L Anion gap 8 3.0 - 18 mmol/L Glucose 69 (L) 74 - 99 mg/dL BUN 17 7.0 - 18 MG/DL Creatinine 1.06 0.6 - 1.3 MG/DL  
 BUN/Creatinine ratio 16 12 - 20 GFR est AA >60 >60 ml/min/1.73m2 GFR est non-AA >60 >60 ml/min/1.73m2 Comment:  
   (NOTE) Estimated GFR is calculated using the Modification of Diet in Renal  
Disease (MDRD) Study equation, reported for both  Americans [facility]) and non- Americans (GFRNA), and normalized to 1.73m2  
body surface area. The physician must decide which value applies to  
the patient. The MDRD study equation should only be used in  
individuals age 25 or older. It has not been validated for the  
following: pregnant women, patients with serious comorbid conditions,  
or on certain medications, or persons with extremes of body size,  
muscle mass, or nutritional status. Calcium 9.1 8.5 - 10.1 MG/DL Bilirubin, total 0.6 0.2 - 1.0 MG/DL  
 ALT 24 16 - 61 U/L  
 AST 13 (L) 15 - 37 U/L Alk. phosphatase 79 45 - 117 U/L Protein, total 7.2 6.4 - 8.2 g/dL Albumin 4.0 3.4 - 5.0 g/dL Globulin 3.2 2.0 - 4.0 g/dL A-G Ratio 1.3 0.8 - 1.7    
CBC W/O DIFF Result Value Ref Range WBC 9.0 4.6 - 13.2 K/uL  
 RBC 4.68 (L) 4.70 - 5.50 M/uL  
 HGB 14.3 13.0 - 16.0 g/dL HCT 42.3 36.0 - 48.0 % MCV 90.4 74.0 - 97.0 FL  
 MCH 30.6 24.0 - 34.0 PG  
 MCHC 33.8 31.0 - 37.0 g/dL  
 RDW 13.7 11.6 - 14.5 % PLATELET 665 540 - 247 K/uL MPV 9.8 9.2 - 11.8 FL  
MICROALBUMIN, UR, RAND W/ MICROALBUMIN/CREA RATIO Result Value Ref Range Microalbumin,urine random 0.60 0 - 3.0 MG/DL Creatinine, urine 86.22 30 - 125 mg/dL Microalbumin/Creat ratio (mg/g creat) 7 0 - 30 mg/g URINALYSIS W/ RFLX MICROSCOPIC Result Value Ref Range Color YELLOW Appearance CLEAR Specific gravity 1.017 1.005 - 1.030    
 pH (UA) 5.0 5.0 - 8.0 Protein NEGATIVE  NEG mg/dL Glucose NEGATIVE  NEG mg/dL Ketone NEGATIVE  NEG mg/dL Bilirubin NEGATIVE  NEG Blood NEGATIVE  NEG Urobilinogen 0.2 0.2 - 1.0 EU/dL Nitrites NEGATIVE  NEG Leukocyte Esterase NEGATIVE  NEG    
 
 
 
 
 
 
 
 
 
 
RECOMMENDATIONS per Dr. Elijah Jordan: 
 
Your labs do not show any signs of diabetic kidney damage. Your hemoglobin A1c was 7.3%.  This is slightly increased from 6 months ago. Jaspreet Salt is to get this back below 7%.  I think if you are careful with your diet now that the holidays are over you can do this without a change in medication. Blood counts are stable. Urine screening was normal.  
Kidney and liver testing is normal.  Your fasting blood sugar was a little bit low at 69. Please call me if you have any questions: [phone] Sincerely, 
 
 
 
Nurse Amy Painting

## 2017-01-17 ENCOUNTER — TELEPHONE (OUTPATIENT)
Dept: FAMILY MEDICINE CLINIC | Age: 67
End: 2017-01-17

## 2017-01-17 LAB
CREAT UR-MCNC: 86.22 MG/DL (ref 30–125)
MICROALBUMIN UR-MCNC: 0.6 MG/DL (ref 0–3)
MICROALBUMIN/CREAT UR-RTO: 7 MG/G (ref 0–30)

## 2017-01-17 NOTE — TELEPHONE ENCOUNTER
Faxed OV note from 1/16/17 to [facility] @ [phone] (area code included per [facility] due to being on [employer]). Fax confirmation submitted for scanning. Me      1/10/17 2:25 PM   Note      Deepa Calero with [facility] called from #[phone] stating they received last office visit note from July 2016, requesting updated note. Advised pt has been seen for sick visit, but not follow-up.  [facility] stated they will call pt to scheduled f/u with PCP

## 2017-01-24 NOTE — PROGRESS NOTES
Alfonso Olivarez  is not active on [employer]. Please contact pt with results and offer to mail a copy of results letter. Your labs do not show any signs of diabetic kidney damage. Your hemoglobin A1c was 7.3%. This is slightly increased from 6 months ago. Goal is to get this back below 7%. I think if you are careful with your diet now that the holidays are over you can do this without a change in medication. Blood counts are stable. Urine screening was normal.  Kidney and liver testing is normal.  Your fasting blood sugar was a little bit low at 69.

## 2017-04-21 ENCOUNTER — TELEPHONE (OUTPATIENT)
Dept: FAMILY MEDICINE CLINIC | Age: 67
End: 2017-04-21

## 2017-04-21 DIAGNOSIS — K21.9 GASTROESOPHAGEAL REFLUX DISEASE WITHOUT ESOPHAGITIS: Chronic | ICD-10-CM

## 2017-04-21 DIAGNOSIS — E78.00 HYPERCHOLESTEROLEMIA: Chronic | ICD-10-CM

## 2017-04-21 DIAGNOSIS — E11.9 NON-INSULIN DEPENDENT TYPE 2 DIABETES MELLITUS (HCC): Primary | Chronic | ICD-10-CM

## 2017-04-21 DIAGNOSIS — I10 ESSENTIAL HYPERTENSION: Chronic | ICD-10-CM

## 2017-04-21 NOTE — TELEPHONE ENCOUNTER
Pt is requesting routine labs prior to [address] on 5/1. Please review and order accordingly.

## 2017-04-21 NOTE — TELEPHONE ENCOUNTER
Fasting labs to be done 1 week prior to visit. Nothing but water and meds for 8 hours prior to lab draw.       Future Appointments  Date Time Provider Nora Aleah   5/1/2017 1:00 PM MD NIR Martin SCHED        Orders Placed This Encounter    HEMOGLOBIN A1C WITH EAG     Standing Status:   Future     Standing Expiration Date:   9/90/5429    METABOLIC PANEL, COMPREHENSIVE     Standing Status:   Future     Standing Expiration Date:   10/19/2017    LIPID PANEL     Standing Status:   Future     Standing Expiration Date:   4/22/2018

## 2017-04-25 ENCOUNTER — HOSPITAL ENCOUNTER (OUTPATIENT)
Dept: LAB | Age: 67
Discharge: HOME | End: 2017-04-25
Payer: MEDICARE

## 2017-04-25 DIAGNOSIS — E78.00 HYPERCHOLESTEROLEMIA: Chronic | ICD-10-CM

## 2017-04-25 DIAGNOSIS — E11.9 NON-INSULIN DEPENDENT TYPE 2 DIABETES MELLITUS (HCC): Chronic | ICD-10-CM

## 2017-04-25 DIAGNOSIS — K21.9 GASTROESOPHAGEAL REFLUX DISEASE WITHOUT ESOPHAGITIS: Chronic | ICD-10-CM

## 2017-04-25 DIAGNOSIS — I10 ESSENTIAL HYPERTENSION: Chronic | ICD-10-CM

## 2017-04-25 LAB
ALBUMIN SERPL BCP-MCNC: 3.8 G/DL (ref 3.4–5)
ALBUMIN/GLOB SERPL: 1.2 {RATIO} (ref 0.8–1.7)
ALP SERPL-CCNC: 75 U/L (ref 45–117)
ALT SERPL-CCNC: 33 U/L (ref 16–61)
ANION GAP BLD CALC-SCNC: 8 MMOL/L (ref 3–18)
AST SERPL W P-5'-P-CCNC: 18 U/L (ref 15–37)
BILIRUB SERPL-MCNC: 0.5 MG/DL (ref 0.2–1)
BUN SERPL-MCNC: 15 MG/DL (ref 7–18)
BUN/CREAT SERPL: 15 (ref 12–20)
CALCIUM SERPL-MCNC: 8.5 MG/DL (ref 8.5–10.1)
CHLORIDE SERPL-SCNC: 105 MMOL/L (ref 100–108)
CHOLEST SERPL-MCNC: 150 MG/DL
CO2 SERPL-SCNC: 26 MMOL/L (ref 21–32)
CREAT SERPL-MCNC: 1.03 MG/DL (ref 0.6–1.3)
EST. AVERAGE GLUCOSE BLD GHB EST-MCNC: 166 MG/DL
GLOBULIN SER CALC-MCNC: 3.1 G/DL (ref 2–4)
GLUCOSE SERPL-MCNC: 151 MG/DL (ref 74–99)
HBA1C MFR BLD: 7.4 % (ref 4.2–5.6)
HDLC SERPL-MCNC: 32 MG/DL (ref 40–60)
HDLC SERPL: 4.7 {RATIO} (ref 0–5)
LDLC SERPL CALC-MCNC: 84.6 MG/DL (ref 0–100)
LIPID PROFILE,FLP: ABNORMAL
POTASSIUM SERPL-SCNC: 4.3 MMOL/L (ref 3.5–5.5)
PROT SERPL-MCNC: 6.9 G/DL (ref 6.4–8.2)
SODIUM SERPL-SCNC: 139 MMOL/L (ref 136–145)
TRIGL SERPL-MCNC: 167 MG/DL (ref ?–150)
VLDLC SERPL CALC-MCNC: 33.4 MG/DL

## 2017-04-25 PROCEDURE — 36415 COLL VENOUS BLD VENIPUNCTURE: CPT | Performed by: INTERNAL MEDICINE

## 2017-04-25 PROCEDURE — 80053 COMPREHEN METABOLIC PANEL: CPT | Performed by: INTERNAL MEDICINE

## 2017-04-25 PROCEDURE — 83036 HEMOGLOBIN GLYCOSYLATED A1C: CPT | Performed by: INTERNAL MEDICINE

## 2017-04-25 PROCEDURE — 80061 LIPID PANEL: CPT | Performed by: INTERNAL MEDICINE

## 2017-04-28 DIAGNOSIS — E11.9 NON-INSULIN DEPENDENT TYPE 2 DIABETES MELLITUS (HCC): Primary | Chronic | ICD-10-CM

## 2017-04-28 NOTE — PROGRESS NOTES
Liseth Jackson  is not active on [employer]. Please contact pt with results and offer to mail a copy of results letter. Kidney testing is stable, liver testing is normal, electrolytes are normal, fasting glucose level was 151 (elevated). Cholesterol levels are controlled with LDL \"bad\" cholesterol below 100. HDL \"good\" cholesterol remains low at 32. Triglycerides are improved to 167, down from 215.    -- Hemoglobin A1C (a blood test evaluating average blood sugar control for the last 3 months) was slightly higher at 7.4%. This is a little above goal of 7%, but not terrible. Recommend adding [place] and [place] once daily. [Normal is 5.6% or less; Prediabetes 5.7%-6.4%; Diabetes 6.5% or higher]. Continued careful diet and regular exercise recommended.

## 2017-04-28 NOTE — PROGRESS NOTES
Rescheduled due to provider illness    PRE-VISIT PLANNING:     Future Appointments  Date Time Provider Nora Aleah   5/1/2017 1:00 PM Lisa Hannon MD [address] (PCP is Lisa Hannon MD) is scheduled for an office visit with Lisa Hannon MD on 05/01/2017 for Mary Donis  . Encounter opened prior to visit to complete pre-visit planning, update and review pertinent sections in the chart. Last office visit with PCP was 1/16/2017.       Rooming Nurse Items:    Pending items for provider to review with patient:  -- Recently completed labs   Health Maintenance Due   Topic Date Due    DTaP/Tdap/Td series (1 - Tdap) 10/06/1969    MEDICARE YEARLY EXAM  04/02/2017

## 2017-05-01 ENCOUNTER — OFFICE VISIT (OUTPATIENT)
Dept: FAMILY MEDICINE CLINIC | Age: 67
End: 2017-05-01

## 2017-05-01 DIAGNOSIS — Z00.00 ROUTINE GENERAL MEDICAL EXAMINATION AT A HEALTH CARE FACILITY: ICD-10-CM

## 2017-05-03 ENCOUNTER — OFFICE VISIT (OUTPATIENT)
Dept: FAMILY MEDICINE CLINIC | Age: 67
End: 2017-05-03

## 2017-05-03 VITALS
WEIGHT: 265.8 LBS | OXYGEN SATURATION: 96 % | TEMPERATURE: 97.5 F | HEART RATE: 56 BPM | SYSTOLIC BLOOD PRESSURE: 129 MMHG | RESPIRATION RATE: 16 BRPM | DIASTOLIC BLOOD PRESSURE: 74 MMHG | BODY MASS INDEX: 33.05 KG/M2 | HEIGHT: 75 IN

## 2017-05-03 DIAGNOSIS — Z13.39 SCREENING FOR ALCOHOLISM: ICD-10-CM

## 2017-05-03 DIAGNOSIS — E78.00 HYPERCHOLESTEROLEMIA: Chronic | ICD-10-CM

## 2017-05-03 DIAGNOSIS — E11.9 NON-INSULIN DEPENDENT TYPE 2 DIABETES MELLITUS (HCC): Primary | Chronic | ICD-10-CM

## 2017-05-03 DIAGNOSIS — E16.2 HYPOGLYCEMIA: Chronic | ICD-10-CM

## 2017-05-03 DIAGNOSIS — Z71.89 ACP (ADVANCE CARE PLANNING): ICD-10-CM

## 2017-05-03 DIAGNOSIS — R09.81 NASAL SINUS CONGESTION: ICD-10-CM

## 2017-05-03 DIAGNOSIS — E11.42 DIABETIC POLYNEUROPATHY ASSOCIATED WITH TYPE 2 DIABETES MELLITUS (HCC): Chronic | ICD-10-CM

## 2017-05-03 DIAGNOSIS — M19.042 PRIMARY OSTEOARTHRITIS OF LEFT HAND: ICD-10-CM

## 2017-05-03 DIAGNOSIS — Z13.31 SCREENING FOR DEPRESSION: ICD-10-CM

## 2017-05-03 DIAGNOSIS — I10 ESSENTIAL HYPERTENSION: Chronic | ICD-10-CM

## 2017-05-03 DIAGNOSIS — Z00.00 ROUTINE GENERAL MEDICAL EXAMINATION AT A HEALTH CARE FACILITY: ICD-10-CM

## 2017-05-03 RX ORDER — NAPROXEN 500 MG/1
500 TABLET ORAL 2 TIMES DAILY WITH MEALS
Qty: 180 TAB | Refills: 1 | Status: SHIPPED | OUTPATIENT
Start: 2017-05-03 | End: 2019-10-28

## 2017-05-03 RX ORDER — ALPRAZOLAM 0.25 MG/1
0.25 TABLET ORAL
Qty: 90 TAB | Refills: 1 | Status: SHIPPED | OUTPATIENT
Start: 2017-05-03 | End: 2019-10-28

## 2017-05-03 RX ORDER — GLIPIZIDE 10 MG/1
10 TABLET ORAL 2 TIMES DAILY
Qty: 180 TAB | Refills: 3 | Status: SHIPPED | OUTPATIENT
Start: 2017-05-03 | End: 2018-03-01 | Stop reason: SDUPTHER

## 2017-05-03 NOTE — PATIENT INSTRUCTIONS
STAY UP TO DATE WITH YOUR PREVENTIVE HEALTH:     Please review the following recommendations and if you are not sure that your healthcare is up to date, ask your provider at your next scheduled office visit. -- Yearly preventive visits (sometimes called \"physicals\") are recommended for all adults. Medicare and Medicaid do not pay for physicals. Medicare covers a yearly wellness visit that differs in many ways from a traditional physical, but is an opportunity to make sure you are maximizing the preventive services that will be covered by Medicare. Each insurance carrier will have different regulations about what services may be covered, so be sure to talk with your insurance provider before scheduling a visit. -- Colon cancer screening is recommended for all patients 48 and older with either colonoscopy (interval will vary depending on results) or yearly stool testing.      -- Mammogram is recommended every 2 years for women ages 36 to 76. -- Pap smear is recommended every 3-5 years for women aged 24 to 72, unless your cervix has been surgically removed for benign reasons. -- Flu shot is recommended every fall for adults of all ages. -- Prevnar 15 is recommended at the age of 72 for all adults. -- Pneumovax is recommended one year after Prevnar 13 for all adults, but earlier if you have a history of chronic health problems (including diabetes and asthma) or smoking. -- Tetanus boosters are recommended every 10 years for adults of all ages. Medicare and Medicaid do not cover tetanus as a preventive booster, but will pay for patients to receive a booster in the event of certain injuries. INSTRUCTIONS AFTER YOUR VISIT ON 5/3/2017     -- Update labs and schedule follow up in 3 months     TESTING RESULTS   -- Lab results may become available for your review on [employer] before your provider has an opportunity to write you a note about results.   Review of routine lab results will generally be done in 10-14 days. Please save your inquiries about results until your provider has had time to review them. -- If you have testing done outside of the office, please call to let us know the date and location that your testing was completed. Results can often be obtained faster if an inquiry is run. MEDICATION REFILLS      -- Controlled substance refills (medications that require printed prescriptions for monitoring of use per [facility] guidelines) must be picked up in the office and per medical group policy will require a scheduled office visit with the provider. Calling the after hours answering service to request a controlled substance represents a violation of [facility] controlled substance policy. -- All other medication refills are to be requested by calling your pharmacy during regular office hours. The after hours physician on call is not required to respond to calls about medication refills. It is your responsibility to keep track of when you may be running out of medication and to place refill requests at least 3 business days prior. [place]      Scheduling appointments can be done at the  or by calling [phone] and selecting option #1. HOLIDAY CLOSURES:     The office is closed on six major holidays each year:  New Year's Day, July 4th, Memorial Day, Labor Day, Thanksgiving, and [place] Day. Lab and X-ray services are not available on those days.

## 2017-05-03 NOTE — MR AVS SNAPSHOT
Visit Information Date & Time Provider Department Dept. Phone Encounter #  
 5/3/2017  9:30 AM Lisa Hannon, [facility] [phone] [id number] Follow-up Instructions Return in about 3 months (around 8/3/2017) for 30 min visit. Upcoming Health Maintenance Date Due DTaP/Tdap/Td series (1 - Tdap) 10[birth year]6/1971 MEDICARE YEARLY EXAM 4/2/2017 INFLUENZA AGE 9 TO ADULT 8/1/2017 HEMOGLOBIN A1C Q6M 10/25/2017 FOOT EXAM Q1 1/16/2018 MICROALBUMIN Q1 1/16/2018 Pneumococcal 65+ Low/Medium Risk (2 of 2 - PPSV23) 1/17/2018 EYE EXAM RETINAL OR DILATED Q1 3/3/2018 LIPID PANEL Q1 4/25/2018 GLAUCOMA SCREENING Q2Y 3/3/2019 COLONOSCOPY 5/4/2025 Allergies as of 5/3/2017  Review Complete On: 5/3/2017 By: Lisa Hannon MD  
  
 Severity Noted Reaction Type Reactions Zocor [Simvastatin] Medium 11/01/2013   Side Effect Myalgia Tolerating high dose Atorvastatin Current Immunizations  Reviewed on 1/17/2017 Name Date Influenza Vaccine 10/3/2016, 9/2/2015  9:29 AM, 10/8/2014, 10/2/2013, 9/24/2012 Pneumococcal Conjugate (PCV-13) 1/17/2017 Zoster Vaccine, Live 9/24/2012 Not reviewed this visit You Were Diagnosed With   
  
 Codes Comments Non-insulin dependent type 2 diabetes mellitus ([facility]    -  Primary ICD-10-CM: E11.9 ICD-9-CM: 250.00 Diabetic polyneuropathy associated with type 2 diabetes mellitus ([facility]     ICD-10-CM: E11.42 
ICD-9-CM: 250.60, 357.2 Essential hypertension     ICD-10-CM: I10 
ICD-9-CM: 401.9 Hypercholesterolemia     ICD-10-CM: E78.00 ICD-9-CM: 272.0 Routine general medical examination at a health care facility     ICD-10-CM: Z00.00 ICD-9-CM: V70.0 Screening for alcoholism     ICD-10-CM: Z13.89 ICD-9-CM: V79.1 Screening for depression     ICD-10-CM: Z13.89 ICD-9-CM: V79.0 ACP (advance care planning)     ICD-10-CM: Z71.89 ICD-9-CM: V65.49 Uncontrolled type 2 diabetes mellitus without complication, with long-term current use of insulin (HCC)     ICD-10-CM: E11.65, Z79.4 ICD-9-CM: 250.02, V58.67 Vitals BP Pulse Temp Resp Height(growth percentile) Weight(growth percentile) 129/74 (!) 56 97.5 °F (36.4 °C) (Oral) 16 6' 3\" (1.905 m) 265 lb 12.8 oz (120.6 kg) SpO2 BMI Smoking Status 96% 33.22 kg/m2 Former Smoker Vitals History BMI and BSA Data Body Mass Index Body Surface Area  
 33.22 kg/m 2 2.53 m 2 Preferred Pharmacy Pharmacy Name Phone [facility] [phone] Your Updated Medication List  
  
   
This list is accurate as of: 5/3/17 10:07 AM.  Always use your most recent med list.  
  
  
  
  
 ALPRAZolam 0.25 mg tablet Commonly known as:  Maryjane Knee Take 1 Tab by mouth two (2) times daily as needed for Anxiety. Max Daily Amount: 0.5 mg. Indications: anxiety  
  
 aspirin 81 mg chewable tablet Take 81 mg by Mouth Once a Day. atorvastatin 80 mg tablet Commonly known as:  LIPITOR Take 1 Tab by mouth daily. Take 80 mg by Mouth Once a Day. Blood-Glucose Meter Misc  
BG meter for monitoring BG - ICD 9 250.00 - Diabetes mellitus type 2 uncontrolled - BID testing. carboxymethylcellulose sodium 1 % Dlgl Apply  to eye. [facility] Commonly known as:  Lancing Device with Lancets DM2 uncontrolled - BG testing BID. fexofenadine-pseudoephedrine 180-240 mg per tablet Commonly known as:  ALLEGRA-D 24 HOUR Take 1 Tab by mouth daily. glipiZIDE 10 mg tablet Commonly known as:  [place] Yao Take 1 Tab by mouth two (2) times a day. * glucose blood VI test strips strip Commonly known as:  blood glucose test  
Uncontrolled DM2. BID testing. * glucose blood VI test strips strip Commonly known as:  FREESTYLE LITE STRIPS  
 ICD10 E11.65 - DM2 daily BG testing. Lancets Misc Uncontrolled DM2. BID testing. Disp 2 boxes of 100 each with 3 refills for 90 day supply. LEVITRA 10 mg tablet Generic drug:  vardenafil Take 10 mg by mouth as needed. meloxicam 7.5 mg tablet Commonly known as:  MOBIC Take 1 Tab by mouth daily as needed for Pain (arthritis). metFORMIN  mg tablet Commonly known as:  GLUCOPHAGE XR Take 1 Tab by mouth two (2) times a day. mometasone 50 mcg/actuation nasal spray Commonly known as:  NASONEX  
2 Sprays by Both Nostrils route daily. naproxen 500 mg tablet Commonly known as:  NAPROSYN Take 1 Tab by mouth two (2) times daily (with meals). Take 1 Tab by Mouth Twice Daily. olopatadine 0.1 % ophthalmic solution Commonly known as:  PATANOL Administer 2 Drops to both eyes two (2) times daily as needed for Allergies. omega-3 fatty acids-vitamin e 1,000 mg Cap Commonly known as:  FISH OIL Take 1 Cap by mouth two (2) times a day. omeprazole 20 mg capsule Commonly known as:  PRILOSEC Take 1 Cap by mouth daily. OTHER Heel cup  Indications: heel pain  
  
 oxyCODONE-acetaminophen 5-325 mg per tablet Commonly known as:  PERCOCET Take 1 Tab by mouth every six (6) hours as needed for Pain. Max Daily Amount: 4 Tabs. pramipexole 0.5 mg tablet Commonly known as:  MIRAPEX Take 1 Tab by mouth three (3) times daily as needed. Indications: RESTLESS LEGS SYNDROME  
  
 telmisartan-hydroCHLOROthiazide 40-12.5 mg per tablet Commonly known as:  MICARDIS HCT Take 1 Tab by mouth daily. triamcinolone 0.5 % topical cream  
Commonly known as:  ARISTOCORT Apply  to affected area two (2) times a day. use thin layer to affected area * Notice: This list has 2 medication(s) that are the same as other medications prescribed for you.  Read the directions carefully, and ask your doctor or other care provider to review them with you. Prescriptions Printed Refills ALPRAZolam (XANAX) 0.25 mg tablet 1 Sig: Take 1 Tab by mouth two (2) times daily as needed for Anxiety. Max Daily Amount: 0.5 mg. Indications: anxiety Class: Print Route: Oral  
  
Prescriptions Sent to Pharmacy Refills  
 glipiZIDE (GLUCOTROL) 10 mg tablet 3 Sig: Take 1 Tab by mouth two (2) times a day. Class: Normal  
 [facility]: [facility]  [address], [address] Ph #: [phone] Route: Oral  
 naproxen (NAPROSYN) 500 mg tablet 1 Sig: Take 1 Tab by mouth two (2) times daily (with meals). Take 1 Tab by Mouth Twice Daily. Class: Normal  
 [facility]: [facility]. [facility]  [address], [address] Ph #: [phone] Route: Oral  
  
We Performed the Following [id number] Follow-up Instructions Return in about 3 months (around 8/3/2017) for 30 min visit. To-Do List   
 05/03/2017 Lab:  HEMOGLOBIN A1C WITH EAG   
  
 05/03/2017 Lab:  METABOLIC PANEL, COMPREHENSIVE Patient Instructions STAY UP TO DATE WITH YOUR PREVENTIVE HEALTH:    
Please review the following recommendations and if you are not sure that your healthcare is up to date, ask your provider at your next scheduled office visit. -- Yearly preventive visits (sometimes called \"physicals\") are recommended for all adults. Medicare and Medicaid do not pay for physicals. Medicare covers a yearly wellness visit that differs in many ways from a traditional physical, but is an opportunity to make sure you are maximizing the preventive services that will be covered by Medicare. Each insurance carrier will have different regulations about what services may be covered, so be sure to talk with your insurance provider before scheduling a visit. -- Colon cancer screening is recommended for all patients 48 and older with either colonoscopy (interval will vary depending on results) or yearly stool testing.   
 
-- Mammogram is recommended every 2 years for women ages 36 to 76. -- Pap smear is recommended every 3-5 years for women aged 24 to 72, unless your cervix has been surgically removed for benign reasons. -- Flu shot is recommended every fall for adults of all ages. -- Prevnar 15 is recommended at the age of 72 for all adults. -- Pneumovax is recommended one year after Prevnar 13 for all adults, but earlier if you have a history of chronic health problems (including diabetes and asthma) or smoking. -- Tetanus boosters are recommended every 10 years for adults of all ages. Medicare and Medicaid do not cover tetanus as a preventive booster, but will pay for patients to receive a booster in the event of certain injuries. INSTRUCTIONS AFTER YOUR VISIT ON 5/3/2017  
 
-- Update labs and schedule follow up in 3 months TESTING RESULTS  
-- Lab results may become available for your review on [employer] before your provider has an opportunity to write you a note about results. Review of routine lab results will generally be done in 10-14 days. Please save your inquiries about results until your provider has had time to review them. -- If you have testing done outside of the office, please call to let us know the date and location that your testing was completed. Results can often be obtained faster if an inquiry is run. MEDICATION REFILLS   
 
-- Controlled substance refills (medications that require printed prescriptions for monitoring of use per [facility] guidelines) must be picked up in the office and per medical group policy will require a scheduled office visit with the provider.    Calling the after hours answering service to request a controlled substance represents a violation of [facility] controlled substance policy. -- All other medication refills are to be requested by calling your pharmacy during regular office hours. The after hours physician on call is not required to respond to calls about medication refills. It is your responsibility to keep track of when you may be running out of medication and to place refill requests at least 3 business days prior. [facility] Scheduling appointments can be done at the  or by calling [phone] and selecting option #1. HOLIDAY CLOSURES:  
 
The office is closed on six major holidays each year:  New Year's Day, July 4th, Memorial Day, Labor Day, Thanksgiving, and [place] Day. Lab and X-ray services are not available on those days. Introducing [facility]! Gregory Fine introduces [employer] patient portal. Now you can access parts of your medical record, email your doctor's office, and request medication refills online. 1. In your internet browser, go to [email or web address] 2. Click on the First Time User? Click Here link in the Sign In box. You will see the New Member Sign Up page. 3. Enter your [employer] Access Code exactly as it appears below. You will not need to use this code after youve completed the sign-up process. If you do not sign up before the expiration date, you must request a new code. · [employer] Access Code: [email or web address] Expires: 7/27/2017  4:28 PM 
 
4. Enter the last four digits of your Social Security Number (xxxx) and Date of Birth (mm/dd/yyyy) as indicated and click Submit. You will be taken to the next sign-up page. 5. Create a [email or web address] ID. This will be your [employer] login ID and cannot be changed, so think of one that is secure and easy to remember. 6. Create a [employer] password. You can change your password at any time. 7. Enter your Password Reset Question and Answer.  This can be used at a later time if you forget your password. 8. Enter your e-mail address. You will receive e-mail notification when new information is available in [address] 9. Click Sign Up. You can now view and download portions of your medical record. 10. Click the Download Summary menu link to download a portable copy of your medical information. If you have questions, please visit the Frequently Asked Questions section of the [employer] website. Remember, [employer] is NOT to be used for urgent needs. For medical emergencies, dial 911. Now available from your iPhone and Android! Please provide this summary of care documentation to your next provider. Your primary care clinician is listed as Stephanie Betancur. If you have any questions after today's visit, please call [phone].

## 2017-05-03 NOTE — PROGRESS NOTES
Internal Medicine/Primary Care  Acute Care Visit  [facility]  [address], [address], [address] [facility]  Phone [phone]  Fax [phone]    Date of Service:  2017  Patient's Name: Gissel Mackey   Patient's :  1950     Subjective/Discussion        Chief Complaint   Patient presents with   [facility] Annual Wellness Visit        Gissel Mackey is a 74 y.o. male patient of Meme Monae MD who presented with complaints of left sinus congestion for several months, worrying him. Nasal steroid spray hasn't helped. Seen in ER in February and labeled as sinusitis. Treated with Flonase, doxy and prednisone. Did help for a few days partially, but still could only lay down for a few hours before left sinuses/nares feel obstructed and wake him up. Has seen ENT in the past.   Left nostril always feels congested/obstructed, but much worse at nighttime, can't sleep on left side, can barely breathe through left nostril. Sleeping in recliner b/c symptoms alarm him at nighttime so much if he lays down. Has sleep apnea. Swore off taking sleeping pills b/c of having to escalate dosing to the point of over-sedation. Taking a natural sleep remedy from the herbal store that helps his wife sleep for 7-8 hours, but if he lays down left nasal obstruction awakens him in 1-2 hours. Requesting refills on PRN Xanax (still has a few tabs left from ) and on glipizide. Noting BG levels are dropping into 60s in the evenings sometimes. Hasn't been watching carb content in food closely. Hasn't taken Januvia b/c of low BGs. Getting arthritis pains in let 3rd digit at MCP intermittently and without clear trigger. Hasn't had naproxen in several years. REVIEW OF SYSTEMS       Complete review of systems negative except as noted in HPI.       Exam:   VS:    Visit Vitals    /74    Pulse (!) 56    Temp 97.5 °F (36.4 °C) (Oral)    Resp 16    Ht 6' 3\" (1.905 m)    Wt 265 lb 12.8 oz (120.6 kg)    SpO2 96%    BMI 33.22 kg/m2       General:   Pleasant age appropriate male, appears to be feeling well, well-groomed, conversant, alert, in no acute distress. Head:  Normocephalic, atraumatic  Ears:  External ears WNL, no pain with movement of pinna, no TTP of mastoid processes    EACs clear    TMs WNL bilaterally with no bulging, or erythema, no medial effusions present  Eyes:  EOMI, PERRL, no pain with eye movements    No conjunctival injection, abnormal tearing, discharge, chemosis  Mouth: MMM, o/p with mild posterior swelling/congestion and clear nasal drainage,     WNL without membranes, exudates, ulcerations or petechiae  Nose:  External nares WNL    Nasal turbinates congested with narrow passages bilaterally, limited view of turbinates, no masses or visible polyps, clear mucus evident  Neck:  Neck supple with normal ROM for age, no thyromegaly, no LAD  Cardiovasc:   Regular rate and rhythm, no murmurs, no rubs, no gallops  Pulmonary:   Clear breath sounds bilaterally, good air movement,    No wheezing, no rales, no rhonchi, normal respiratory effort  Abdomen:   Abdomen soft, nontender, nondistended, NABS, no masses  Extremities:   No dependent edema, no tenderness with palpation of calves,     Warm and well-perfused at distal extremities  Skin:    No rashes noted. No results found for this or any previous visit (from the past 12 hour(s)). Encounter Diagnoses:     Encounter Diagnoses     ICD-10-CM ICD-9-CM   1. Non-insulin dependent type 2 diabetes mellitus ([facility] [address] E11.9 250.00   2. Uncontrolled type 2 diabetes mellitus without complication, with long-term current use of insulin ([facility]) E11.65 250.02    Z79.4 V58.67   3. Diabetic polyneuropathy associated with type 2 diabetes mellitus ([facility]) E11.42 250.60     357.2   4. Hypoglycemia E16.2 251.2   5. Nasal sinus congestion R09.81 478.19   6.  Essential hypertension I10 401.9   7. Hypercholesterolemia E78.00 272.0   8. Primary osteoarthritis of left hand M19.042 715.14   9. Routine general medical examination at a health care facility Z00.00 V70.0   10. Screening for alcoholism Z13.89 V79.1   11. Screening for depression Z13.89 V79.0   12. ACP (advance care planning) Z71.89 V65.49       Assessment/Plan:       Marek Piper was seen today for follow up. Having hypoglycemia in the afternoons/evenings but overall BG average is elevated and mildly hyperglycemic in the morning while fasting. Likely from swings in BG from high carb intake. Will hold on adding Januvia for now. Patient to work harder on dietary compliance and recheck labs in 3 months - if improved with A1C <7.5% will hold office follow up until 6 months, if not will plan to add januvia, but may need to reduce PM glipizide dosing from 10 to 5 mg. Patient to use Naproxen for OA in hand/finger joint. PRN Xanax refilled (he uses it very sparingly). Referral to ENT for persistent left sided nasal obstruction. See above discussion regarding topics addressed at today's visit. An Moreno was seen today for annual wellness visit. Diagnoses and all orders for this visit:    Non-insulin dependent type 2 diabetes mellitus ([address]  -     HEMOGLOBIN A1C WITH EAG; Future  -     METABOLIC PANEL, COMPREHENSIVE; Future    Uncontrolled type 2 diabetes mellitus without complication, with long-term current use of insulin (HCC)  -     HEMOGLOBIN A1C WITH EAG; Future  -     glipiZIDE (GLUCOTROL) 10 mg tablet; Take 1 Tab by mouth two (2) times a day. Diabetic polyneuropathy associated with type 2 diabetes mellitus (HCC)  -     HEMOGLOBIN A1C WITH EAG; Future  -     METABOLIC PANEL, COMPREHENSIVE; Future    Hypoglycemia  -     HEMOGLOBIN A1C WITH EAG; Future  -     METABOLIC PANEL, COMPREHENSIVE;  Future    Nasal sinus congestion  -     REFERRAL TO ENT-OTOLARYNGOLOGY    Essential hypertension  -     METABOLIC PANEL, COMPREHENSIVE; Future    Hypercholesterolemia    Primary osteoarthritis of left hand    Routine general medical examination at a health care facility    Screening for alcoholism    Screening for depression  -     Depression Screen Annual    ACP (advance care planning)    Other orders  -     ALPRAZolam (XANAX) 0.25 mg tablet; Take 1 Tab by mouth two (2) times daily as needed for Anxiety. Max Daily Amount: 0.5 mg. Indications: anxiety  -     naproxen (NAPROSYN) 500 mg tablet; Take 1 Tab by mouth two (2) times daily (with meals). Take 1 Tab by Mouth Twice Daily. The patient states understanding and agrees with the treatment plan. All questions were answered. Cuate Srivastava MD - Internal Medicine  5/3/2017, 9:41 AM    Patient Care Team:  Cuate Srivastava MD as PCP - General (Internal Medicine)  Erendira Starks RN as Nurse Navigator  Nat Bravo MD as Consulting Provider (Orthopedic Surgery)  Daniel Tavares MD (General Surgery)  Natan Lou MD (Vascular Surgery)  Holly Sams DO as Consulting Provider (Physical Medicine and Rehab)  Hardik Barry MD as Consulting Provider (Neurosurgery)        Additional History     Past Medical History:   Diagnosis Date    Abrasion of skin 1/25/2016    Acid reflux     ACP (advance care planning)     has not yet filled out ACP, discussed 4/1/16    Aneurysm ([address]     Aortic root dilation ([address]     Borderline on echo June 8 2015 (done through [facility])    Arthritis     Avascular necrosis of bones of both hips ([address] 4/19/16    MRI L-spine revealed bialteral AVN femoral heads (MRI/CT dx, report submitted for scannin)    Avascular necrosis of femoral head ([address] 2/2015    BILATERAL, noted on CT 2/24/15 ([place]) - Avascular necrosis involving both femoral heads. No femoral head collapse identified.     Back pain, lumbosacral     Back pain, thoracic     BMI 33.0-33.9,adult     BMI 33.0-33.9,adult     Bone lesion left femur     Asymptomatic, MRI planned for October    Carpal tunnel syndrome, bilateral     Cervical spondylosis     C4-C6    Chronic pain     Compression fracture of thoracic vertebra (HCC) 1/2016    DDD (degenerative disc disease), lumbosacral     Dental disorder     Diabetes ([address]     Elevated transaminase level     AST and ALT     Essential hypertension     GERD (gastroesophageal reflux disease)     Herniated nucleus pulposus     Lumbar diskectomy 1980, C4/5 HNP, has gotten ESIs in the past    History of poliomyelitis     History of shingles 7/2013    Right flank/back, after having vaccine    Hypercholesterolemia     Hypogonadism male     Impingement syndrome of left shoulder 8/2013    Internal hemorrhoids     Long term (current) use of anticoagulants     baby asa    NELSON (obstructive sleep apnea)     Unable to tolerate CPAP    Restless leg syndrome     S/P AAA repair     Dr. Светлана Major following, q3 year duplex of aorta    Seasonal allergic reaction     Type 2 diabetes mellitus, controlled ([facility] [address]      Past Surgical History:   Procedure Laterality Date    HX LUMBAR DISKECTOMY N/A 1980    HX ORTHOPAEDIC      left shoulder    VASCULAR SURGERY PROCEDURE UNLIST N/A     AAA repair with stent, US's at [facility]     Social History   Substance Use Topics    Smoking status: Former Smoker     Types: Cigarettes, Pipe    Smokeless tobacco: Former User      Comment: Quit 43 years ago (2015), pipe 10+ years ago    Alcohol use 0.0 oz/week     0 Glasses of wine per week      Comment: Occasional beer, liquor or wine. No heavy use     Current Outpatient Prescriptions   Medication Sig    glipiZIDE (GLUCOTROL) 10 mg tablet Take 1 Tab by mouth two (2) times a day.  ALPRAZolam (XANAX) 0.25 mg tablet Take 1 Tab by mouth two (2) times daily as needed for Anxiety. Max Daily Amount: 0.5 mg. Indications: anxiety    naproxen (NAPROSYN) 500 mg tablet Take 1 Tab by mouth two (2) times daily (with meals). Take 1 Tab by Mouth Twice Daily.     glucose blood VI test strips (FREESTYLE LITE STRIPS) strip ICD10 E11.65 - DM2 daily BG testing.  oxyCODONE-acetaminophen (PERCOCET) 5-325 mg per tablet Take 1 Tab by mouth every six (6) hours as needed for Pain. Max Daily Amount: 4 Tabs.  OTHER Heel cup  Indications: heel pain    mometasone (NASONEX) 50 mcg/actuation nasal spray 2 Sprays by Both Nostrils route daily.  telmisartan-hydrochlorothiazide (MICARDIS HCT) 40-12.5 mg per tablet Take 1 Tab by mouth daily.  metFORMIN ER (GLUCOPHAGE XR) 750 mg tablet Take 1 Tab by mouth two (2) times a day.  atorvastatin (LIPITOR) 80 mg tablet Take 1 Tab by mouth daily. Take 80 mg by Mouth Once a Day.  Lancets misc Uncontrolled DM2. BID testing. Disp 2 boxes of 100 each with 3 refills for 90 day supply.  glucose blood VI test strips (BLOOD GLUCOSE TEST) strip Uncontrolled DM2. BID testing.  Diabetic Supplies, [address] (LANCING DEVICE WITH LANCETS) misc DM2 uncontrolled - BG testing BID.  Blood-Glucose Meter misc BG meter for monitoring BG - ICD 9 250.00 - Diabetes mellitus type 2 uncontrolled - BID testing.  olopatadine (PATANOL) 0.1 % ophthalmic solution Administer 2 Drops to both eyes two (2) times daily as needed for Allergies.  omeprazole (PRILOSEC) 20 mg capsule Take 1 Cap by mouth daily.  triamcinolone (ARISTOCORT) 0.5 % topical cream Apply  to affected area two (2) times a day. use thin layer to affected area    LEVITRA 10 mg tablet Take 10 mg by mouth as needed.  aspirin 81 mg chewable tablet Take 81 mg by Mouth Once a Day.  omega-3 fatty acids-vitamin e (FISH OIL) 1,000 mg cap Take 1 Cap by mouth two (2) times a day.  fexofenadine-pseudoephedrine (ALLEGRA-D 24 HOUR) 180-240 mg per tablet Take 1 Tab by mouth daily.  carboxymethylcellulose sodium 1 % DLGl Apply  to eye.  pramipexole (MIRAPEX) 0.5 mg tablet Take 1 Tab by mouth three (3) times daily as needed.  Indications: RESTLESS LEGS SYNDROME    meloxicam (MOBIC) 7.5 mg tablet Take 1 Tab by mouth daily as needed for Pain (arthritis). No current facility-administered medications for this visit. Allergies   Allergen Reactions    Zocor [Simvastatin] Myalgia     Tolerating high dose Atorvastatin            This document may have been created with the aid of dictation software. Text may contain errors, particularly phonetic errors.

## 2017-05-03 NOTE — ACP (ADVANCE CARE PLANNING)
ADVANCE CARE PLANNING:       Discussed advance care planning documents/advance care directives with patient today. Provided written information re: ACP documents today including [address] advance directive form with written AVS and encouraged patient to discuss with family/chosen proxy.

## 2017-05-03 NOTE — PROGRESS NOTES
PRE-VISIT PLANNING:     Future Appointments  Date Time Provider Nora Aleah   5/3/2017 9:30 AM Suella Saint, MD [address] (PCP is Suella Saint, MD) is scheduled for an office visit with Suella Saint, MD on 2017 for Mary Purcell . Encounter opened prior to visit to complete pre-visit planning, update and review pertinent sections in the chart. Last office visit with PCP was 2017. Pending items for provider to review with patient:  -- Completed labs in anticipation of visit    Health Maintenance Due   Topic Date Due    DTaP/Tdap/Td series (1 - Tdap) 10/06/1969    MEDICARE YEARLY EXAM  2017          Date of Service:  2017  Patient's Name: Shirin Harrington   Patient's :  1950     This is a Subsequent Medicare Annual Wellness Visit  providing Personalized Prevention Plan Services (PPPS) (Performed 12 months after initial AWV and PPPS )    I have reviewed the patient's medical history in detail and updated the computerized patient record. History     Shirin Harrington is a 74 y.o. male patient who was seen today for a Subsequent Medicare Wellness Visit . Recently completed labs on 17, results and recommendations reviewed with patient. \"Kidney testing is stable, liver testing is normal, electrolytes are normal, fasting glucose level was 151 (elevated). Cholesterol levels are controlled with LDL \"bad\" cholesterol below 100.  HDL \"good\" cholesterol remains low at 32.  Triglycerides are improved to 167, down from 215.    -- Hemoglobin A1C (a blood test evaluating average blood sugar control for the last 3 months) was slightly higher at 7.4%.  This is a little above goal of 7%, but not terrible.  Recommend adding [place] once daily.   [Normal is 5.6% or less; Prediabetes 5.7%-6.4%;  Diabetes 6.5% or higher].  Continued careful diet and regular exercise recommended\"    Patient was notified prior to visit and has not yet started Januvia. Health Maintenance   Topic Date Due    DTaP/Tdap/Td series (1 - Tdap) 10/06/1969    MEDICARE YEARLY EXAM  04/02/2017    INFLUENZA AGE 9 TO ADULT  08/01/2017    HEMOGLOBIN A1C Q6M  10/25/2017    FOOT EXAM Q1  01/16/2018    MICROALBUMIN Q1  01/16/2018    Pneumococcal 65+ Low/Medium Risk (2 of 2 - PPSV23) 01/17/2018    EYE EXAM RETINAL OR DILATED Q1  03/03/2018    LIPID PANEL Q1  04/25/2018    GLAUCOMA SCREENING Q2Y  03/03/2019    COLONOSCOPY  05/04/2025    Hepatitis C Screening  Completed    ZOSTER VACCINE AGE 60>  Completed       Past Medical History:   Diagnosis Date    Abrasion of skin 1/25/2016    Acid reflux     ACP (advance care planning)     has not yet filled out ACP, discussed 4/1/16    Aneurysm ([address]     Aortic root dilation ([address]     Borderline on echo June 8 2015 (done through [facility])    Arthritis     Avascular necrosis of bones of both hips ([address] 4/19/16    MRI L-spine revealed bialteral AVN femoral heads (MRI/CT dx, report submitted for scannin)    Avascular necrosis of femoral head ([address] 2/2015    BILATERAL, noted on CT 2/24/15 ([facility]) - Avascular necrosis involving both femoral heads. No femoral head collapse identified.     Back pain, lumbosacral     Back pain, thoracic     BMI 33.0-33.9,adult     BMI 33.0-33.9,adult     Bone lesion left femur     Asymptomatic, MRI planned for October    Carpal tunnel syndrome, bilateral     Cervical spondylosis     C4-C6    Chronic pain     Compression fracture of thoracic vertebra ([address] 1/2016    DDD (degenerative disc disease), lumbosacral     Dental disorder     Diabetes ([address]     Elevated transaminase level     AST and ALT     Essential hypertension     GERD (gastroesophageal reflux disease)     Herniated nucleus pulposus     Lumbar diskectomy 1980, C4/5 HNP, has gotten ESIs in the past    History of poliomyelitis     History of shingles 7/2013    Right flank/back, after having vaccine    Hypercholesterolemia     Hypogonadism male     Impingement syndrome of left shoulder 8/2013    Internal hemorrhoids     Long term (current) use of anticoagulants     baby asa    NELSON (obstructive sleep apnea)     Unable to tolerate CPAP    Restless leg syndrome     S/P AAA repair     Dr. Zander Harrison following, q3 year duplex of aorta    Seasonal allergic reaction     Type 2 diabetes mellitus, controlled ([facility]      Past Surgical History:   Procedure Laterality Date    HX LUMBAR DISKECTOMY N/A 1980    HX ORTHOPAEDIC      left shoulder    VASCULAR SURGERY PROCEDURE UNLIST N/A     AAA repair with stent, [facility]     Current Outpatient Prescriptions on File Prior to Visit   Medication Sig Dispense Refill    SITagliptin (JANUVIA) 100 mg tablet Take 1 Tab by mouth daily. 90 Tab 3    glucose blood VI test strips (FREESTYLE LITE STRIPS) strip ICD10 E11.65 - DM2 daily BG testing. 100 Strip 3    glipiZIDE (GLUCOTROL) 10 mg tablet Take 1 Tab by mouth two (2) times a day. 180 Tab 3    oxyCODONE-acetaminophen (PERCOCET) 5-325 mg per tablet Take 1 Tab by mouth every six (6) hours as needed for Pain. Max Daily Amount: 4 Tabs. 20 Tab 0    OTHER Heel cup  Indications: heel pain 1 Each 0    mometasone (NASONEX) 50 mcg/actuation nasal spray 2 Sprays by Both Nostrils route daily. 3 Container 3    telmisartan-hydrochlorothiazide (MICARDIS HCT) 40-12.5 mg per tablet Take 1 Tab by mouth daily. 90 Tab 3    metFORMIN ER (GLUCOPHAGE XR) 750 mg tablet Take 1 Tab by mouth two (2) times a day. 180 Tab 3    atorvastatin (LIPITOR) 80 mg tablet Take 1 Tab by mouth daily. Take 80 mg by Mouth Once a Day. 90 Tab 3    Lancets misc Uncontrolled DM2. BID testing. Disp 2 boxes of 100 each with 3 refills for 90 day supply. 2 Package 3    glucose blood VI test strips (BLOOD GLUCOSE TEST) strip Uncontrolled DM2. BID testing. 1 Each 11    Diabetic Supplies, [address]  (LANCING DEVICE WITH LANCETS) misc DM2 uncontrolled - BG testing BID. 1 Each 0    Blood-Glucose Meter misc BG meter for monitoring BG - ICD 9 250.00 - Diabetes mellitus type 2 uncontrolled - BID testing. 1 Each 0    ALPRAZolam (XANAX) 0.25 mg tablet Take 1 Tab by mouth three (3) times daily as needed for Anxiety. Max Daily Amount: 0.75 mg. Indications: ANXIETY 60 Tab 0    olopatadine (PATANOL) 0.1 % ophthalmic solution Administer 2 Drops to both eyes two (2) times daily as needed for Allergies. 15 mL 3    omeprazole (PRILOSEC) 20 mg capsule Take 1 Cap by mouth daily. 90 Cap 3    triamcinolone (ARISTOCORT) 0.5 % topical cream Apply  to affected area two (2) times a day. use thin layer to affected area 15 g 0    LEVITRA 10 mg tablet Take 10 mg by mouth as needed. 90 tablet 0    aspirin 81 mg chewable tablet Take 81 mg by Mouth Once a Day.  naproxen (NAPROSYN) 500 mg tablet Take 1 Tab by Mouth Twice Daily.  omega-3 fatty acids-vitamin e (FISH OIL) 1,000 mg cap Take 1 Cap by mouth two (2) times a day. 180 Cap 3    fexofenadine-pseudoephedrine (ALLEGRA-D 24 HOUR) 180-240 mg per tablet Take 1 Tab by mouth daily. 90 Tab 1    carboxymethylcellulose sodium 1 % DLGl Apply  to eye.  pramipexole (MIRAPEX) 0.5 mg tablet Take 1 Tab by mouth three (3) times daily as needed. Indications: RESTLESS LEGS SYNDROME 270 Tab 3    meloxicam (MOBIC) 7.5 mg tablet Take 1 Tab by mouth daily as needed for Pain (arthritis). 90 Tab 1     No current facility-administered medications on file prior to visit.       Allergies   Allergen Reactions    Zocor [Simvastatin] Myalgia     Tolerating high dose Atorvastatin     Patient Active Problem List    Diagnosis    Non-insulin dependent type 2 diabetes mellitus ([facility]     Goal HbA1C:  <7.0   3/8/2014:  HbA1C 6.6 (H) at goal   9/3/2013 -- HbA1C was 6.9 (H)   8/20/2012 -- HbA1C was 6.6  Goal BP: <130/80   3/6/2014:  132/82  Diabetic medications:   Metformin 500 mg PO BID-AC  Screening for proteinuria:   4/8/2013 -- Microalb/Creat ratio 5  ACE/ARB therapy prescribed:   Telmisartan/HCTZ 40-12.5 mg PO daily    Statin therapy prescribed:   Atorvastatin 80 mg PO QHS   Daily aspirin therapy prescribed:   81 mg daily   Screening for low Vit B12 on longterm metformin therapy:   3/8/2014:  Vit B12 level WNL (707)  Screening for diabetic retinopathy   3/18/2014:  Dilated eye exam done by Dr. Rodriguez Alert of [facility] [place] [phone]  Screening for diabetic neuropathy   3/6/2014:  Diabetic foot exam today WNL BL          Diabetic polyneuropathy associated with type 2 diabetes mellitus ([address]    Thoracic compression fracture ([address]     Dr. Ureña Escort Back pain, thoracic    Back pain, lumbosacral    Bone lesion left femur     Asymptomatic, MRI planned for October      DDD (degenerative disc disease), lumbosacral    Avascular necrosis of bones of both hips ([address]     MRI L-spine revealed bialteral AVN femoral heads (MRI/CT dx, report submitted for scannin)      ACP (advance care planning)     has not yet filled out ACP, discussed 4/1/16      Bilateral arm pain    Bilateral low back pain without sciatica    Fall through floor    Compression fracture of thoracic vertebra ([address]    Aortic root dilation ([address]     Borderline on echo June 8 2015 (done through [facility])      Avascular necrosis of femoral head (HCC)     BILATERAL, noted on CT 2/24/15 ([facility]) - Avascular necrosis involving both femoral heads. No femoral head collapse identified.       Tinnitus of both ears    Low serum testosterone level     3/21/14:  Serum testosterone 179 (L), free testosterone 5.1 (L)  -- Referring to urology to discuss prostate screening and options for repletion      Brachial neuritis    Spinal stenosis in cervical region    Decreased libido     3/19/2014:  Lack of interest recently  -- Check testosterone levels, doubt this has anything to do with hx of aortic aneurysm surgery by Dr. Leeroy Mantilla Sinus congestion     3/19/2014:  Seasonal allergies  -- Resume Nasonex (advised he needs to use this consistently to see a benefit)  -- If no improvement add Allegra-D daily, patient to watch BP on home monitor if he does take this      Cervical spondylosis    Decreased GFR     3/8/2014:  Cr 1.31, eGFR 57  -- Improved on repeat labs with eGFR of 78      Routine adult health maintenance     -- Colon cancer:    Colonoscopy done 11/29/2005 at [facility]  -- Vaccinations:    Influenza vaccine given 2013 at [facility]   Pneumococcal vaccine    Tdap refused by patient at [facility] 2012   Herpes Zoster vaccine given 2012 at [facility]  -- Weight:  Discussed the patient's BMI with him. The BMI follow up plan is as follows  -- Prostate cancer:     Discuss re: screening with PSA between ages 48 and 71      Pulmonary nodule, left     Single 0.5 cm posterior lateral aspect of left lower lobe pleural-based pulmonary nodule; Incidental finding, stable from 11/2008 CT to 6/1/2010 CT      Osteoarthritis of right knee     Xrays ([facility]) R knee - 11/12/13 -- Mild medial compartment DJD  -- s/p steroid injection Nov 2013 [Dr. Janice Cates which resolved knee pain after about a week      Degenerative disc disease     Cervical and lumbar, managed by Dr. Valerio Murrell (pain mgmt, [facility])  -- C4-C6 cervical spondylosis  5/9/2012 -- s/p L4-L5 TIGRE done for right sided sciatica   2/6/2012 -- s/p SEBASTIEN at C5-C6 for cervicalgia, cervical radiculopathy, noted possible left RCT at that time  10/6/2011 -- MRI L-spine with/wo contrast -- DDD at L5-S1 with marked disc space height loss and type 2 discogenic marrow change, left laminotomy and minimal scar tisse.   Right L5 lateral recess extrusion or subtly inferiorly migrated free fragment at L4-5.  3/6/2014:  MRI C-spine to eval worsening neck/shoulder pain L > R (patient states previous pain mgmt doc told him he would need surgery if pain returned)  3/19/2014:    -- MRI confirms multilevel disease with worst disease at C5  -- Patient to notify office of name of neurosurgeon he would like to be referred to      Hypogonadism male     10/6/2010 -- Testosterone 170.0      NELSON (obstructive sleep apnea)     Unable to tolerate CPAP  8/28/12 -- CXR PA&L for chronic dyspnea negative (DJD of spine noted)      Essential hypertension     3/6/2014:  /82  Pulse 51  Wt 259 lb 6.4 oz (117.663 kg)  BMI 32.42 kg/m2   -- Continue Micardis 40-12.5 mg daily        BMI 33.0-33.9,adult    Restless leg syndrome    Chronic pain    Hypercholesterolemia     9/3/13 -- Total chol 140,  (H), HDL 35 (L), LDL 79.2, VLDL 25.8, chol/HDL chol 4.04  9/3/13 -- AST 36, ALT 36, Alk Phos 96, prot 7.6, alb 4.4  4/8/13 -- TSH 2.080  3/6/2014: Total chol 152, , HDL 40, LDL-c 88, VLDL-c 24  -- Continue taking atorvastatin 80 mg PO QHS      Arthritis     Shoulders L > R, knees, back      Acid reflux    S/P AAA repair     Surgery in 2009 by Dr. Edna Lazaro [facility])  -- Informed by [address] that he doesn't need follow up for several years 1/2014 2/7/2013 -- CTA done for mild enlargement noted on US 1/13:  Intact aortic aneurysm repair, dilatation of common iliac arteries with small dissections or ulcerations, infarction of lower pole of right kidney; sinus of valsalva 4 cm, sinotubular jcn 3.2 cm, midascending aorta 2.4 cm, aortic arch 2.4 cm, mid desc aorta 3.3 cm, diaphragmatic hiatus 2.8 cm, celiac trunk 2.8 cm, SMA 2.8 cm, superior extent of repair 2.8 cm, both left and right common iliacs dilated to 1.8 cm.  8/24/2011 -- Abd/Retroperitoneal US  -- Limited eval of abd aorta due to overlying bowel gas. Echogenic sludge in gallbladder, no stones, no cholecystitis, hepatic steatosis. Proximal aorta not visualized. Mid abd ao 2.5 x 2.5 cm, distal portion 2.3 x 2.2 cm, R iliac 1.1 x 1.1 cm and left iliac 1 x 1.1 cm.         Normal cardiac stress test     9/18/2012 -- Normal Lexiscan cardiac stress and rest perfusion study, EF 62% (read by Dr. Qi Trevino, [facility]), [facility]oma Gregg  9/21/2012 -- Holter monitor -- Tonya Mort 44%, tachy 0%, no arrhythmias detected, some premature atrial and ventricular beats  6/17/2010 -- Normal Lexiscan cardiac stress and rest perfusion study, EF 55% (read by Dr. Caity Smiley) [address]       Patient Care Team:  Katlin Luna MD as PCP - General (Internal Medicine)  Yasmin Patino RN as Nurse Navigator  Maribel Bowman MD as Consulting Provider (Orthopedic Surgery)  Marianne Ta MD (General Surgery)  Cody Padilla MD (Vascular Surgery)  Laisha Daniels DO as Consulting Provider (Physical Medicine and Rehab)  Milady Zuluaga MD as Consulting Provider (Neurosurgery)     Depression Risk Factor Screening:     PHQ over the last two weeks 5/3/2017   Little interest or pleasure in doing things Not at all   Feeling down, depressed or hopeless Not at all   Total Score PHQ 2 0       Alcohol Risk Factor Screening: On any occasion during the past 3 months, have you had more than 4 drinks containing alcohol? Yes    Do you average more than 14 drinks per week? No      Functional Ability and Level of Safety:    Hearing Screening    125Hz 250Hz 500Hz 1000Hz 2000Hz 3000Hz 4000Hz 6000Hz 8000Hz   Right ear:   40 40 40  Fail     Left ear: Fail 40 40  Fail     Vision Screening Comments: Pt had eye exam a month ago. Hearing Loss   High frequency hearing loss    Activities of Daily Living   Self-care. Requires assistance with: no ADLs    Fall Risk   The patient  does not have a history of falls. I did complete a risk assessment. Abuse Screen   Patient is not abused    Advance Care Planning   Discussed advance care planning documents/advance care directives with patient today. Patient has not yet completed an ACP document. Provided written information re: ACP documents today with written AVS and encouraged patient to discuss with family/chosen proxy. Review of Systems   Pertinent items are noted in HPI.     Physical Examination     Evaluation of Cognitive Function:  Mood/affect: neutral  Appearance: age appropriate and overweight  Family member/caregiver input: n/a    VS:    Visit Vitals    /74    Pulse (!) 56    Temp 97.5 °F (36.4 °C) (Oral)    Resp 16    Ht 6' 3\" (1.905 m)    Wt 265 lb 12.8 oz (120.6 kg)    SpO2 96%    BMI 33.22 kg/m2       Assessment/Plan     Wolfgang Caputo was seen today for Subsequent MWV. Education and counseling provided regarding age, gender and functional status appropriate HM and screening issues -  discussed with the patient, including CMS covered intervals for screening:    Ann Beach was seen today for annual wellness visit. Diagnoses and all orders for this visit:    Routine general medical examination at a health care facility    Screening for alcoholism    Screening for depression  -     Depression Screen Annual    ACP (advance care planning)      Written AVS printed and provided to patient at end of visit today which included a written 5-year personalized preventive services plan and information about advance care planning (ACP). Patient's questions answered.           Rachel Del Toro MD   Internal Medicine  5/3/2017, 7:27 AM    [address] [facility] [place] [place] [address] [address] [facility]  Phone [phone]  Fax [phone]  [employer]

## 2017-05-03 NOTE — PROGRESS NOTES
Corie Marcum is a 74 y.o. male  Pt here today for wellness visit. 1. Have you been to the ER, urgent care clinic since your last visit? Hospitalized since your last visit? Yes Where: [facility]    2. Have you seen or consulted any other health care providers outside of the [address] [place] [address] since your last visit? Include any pap smears or colon screening.  Yes Where: podiatry

## 2017-06-23 ENCOUNTER — OFFICE VISIT (OUTPATIENT)
Dept: FAMILY MEDICINE CLINIC | Age: 67
End: 2017-06-23

## 2017-06-23 VITALS
HEART RATE: 65 BPM | WEIGHT: 262 LBS | OXYGEN SATURATION: 94 % | HEIGHT: 75 IN | BODY MASS INDEX: 32.58 KG/M2 | RESPIRATION RATE: 17 BRPM | DIASTOLIC BLOOD PRESSURE: 72 MMHG | TEMPERATURE: 99.1 F | SYSTOLIC BLOOD PRESSURE: 121 MMHG

## 2017-06-23 DIAGNOSIS — E11.9 NON-INSULIN DEPENDENT TYPE 2 DIABETES MELLITUS (HCC): Chronic | ICD-10-CM

## 2017-06-23 DIAGNOSIS — L98.9 SKIN LESION OF FACE: ICD-10-CM

## 2017-06-23 DIAGNOSIS — R06.02 SHORTNESS OF BREATH: Primary | ICD-10-CM

## 2017-06-23 DIAGNOSIS — I10 ESSENTIAL HYPERTENSION: Chronic | ICD-10-CM

## 2017-06-23 RX ORDER — AZELASTINE HCL 205.5 UG/1
SPRAY NASAL 2 TIMES DAILY
COMMUNITY
End: 2021-08-30

## 2017-06-23 NOTE — PATIENT INSTRUCTIONS
STAY UP TO DATE WITH YOUR PREVENTIVE HEALTH:     Please review the following recommendations and if you are not sure that your healthcare is up to date, ask your provider at your next scheduled office visit. -- Yearly preventive visits (sometimes called \"physicals\") are recommended for all adults. Medicare and Medicaid do not pay for physicals. Medicare covers a yearly wellness visit that differs in many ways from a traditional physical, but is an opportunity to make sure you are maximizing the preventive services that will be covered by Medicare. Each insurance carrier will have different regulations about what services may be covered, so be sure to talk with your insurance provider before scheduling a visit. -- Colon cancer screening is recommended for all patients 48 and older with either colonoscopy (interval will vary depending on results) or yearly stool testing.      -- Mammogram is recommended every 2 years for women ages 36 to 76. -- Pap smear is recommended every 3-5 years for women aged 24 to 72, unless your cervix has been surgically removed for benign reasons. -- Flu shot is recommended every fall for adults of all ages. -- Prevnar 15 is recommended at the age of 72 for all adults. -- Pneumovax is recommended one year after Prevnar 13 for all adults, but earlier if you have a history of chronic health problems (including diabetes and asthma) or smoking. -- Tetanus boosters are recommended every 10 years for adults of all ages. Medicare and Medicaid do not cover tetanus as a preventive booster, but will pay for patients to receive a booster in the event of certain injuries. INSTRUCTIONS AFTER YOUR VISIT TODAY:     -- Try not to eat within 4 hours of going to sleep. -- REFERRALS - If you were referred to a specialist or consultant today, this often needs to be authorized by your insurance company before it can be scheduled.    You should be contacted within 2 weeks by the consultant's office to schedule the visit. If you do not hear from the specialist's office in that time frame, please call my office and the staff here can check on the status of your referral.        TESTING RESULTS     -- Generally lab results can be obtained by logging into your [facility] account. If your insurance requires use of an outside lab facility, your results may not be visible on your [employer] account. If you need assistance with accessing your account, you can call the [address] [place] [address] at #[phone].     -- Lab results may become available for your review on [facility] before your provider has an opportunity to write you a note about results. Review of testing results will generally be done in 10-14 days. Please save your inquiries about testing results until this time frame has passed. -- If you have testing done outside of the office, please call to let us know the date and location that your testing was completed. Results can often be obtained faster if an inquiry is run. MEDICATION REFILLS      -- Controlled substance refills (medications that require printed prescriptions for monitoring of use per [facility] guidelines) must be picked up in the office and per medical group policy will require a scheduled office visit with the provider. Calling the after hours answering service to request a controlled substance represents a violation of [facility] controlled substance policy. -- All other medication refills are to be requested by calling your pharmacy during regular office hours. The after hours physician on call is not required to respond to calls about medication refills. It is your responsibility to keep track of when you may be running out of medication and to place refill requests at least 3 business days prior. [address]      Scheduling appointments can be done at the  or by calling [phone] and selecting option #1. HOLIDAY CLOSURES:     The office is closed on six major holidays each year:  New Year's Day, July 4th, Memorial Day, Labor Day, Thanksgiving, and Marysol Day. Lab and X-ray services are not available on those days.

## 2017-06-23 NOTE — PROGRESS NOTES
Internal [id number] [facility]  [address], [address] [facility], [address] [facility]  Phone [phone]  Fax [phone]    Date of Service:  2017  Patient's Name: Deborah Cordova   Patient's :  1950     Subjective/Discussion        Chief Complaint   Patient presents with    Shortness of Breath    Nausea        Deborah Cordova is a 74 y.o. male patient of Anil Mar MD who presented with complaints of dyspnea and nausea that occurred overnight 2 nights ago. Has been trying to lose weight, had been cutting back on evening snacking for past 5-6 weeks. BGs have been all below 200 since last visit. Night before last had some cheese around 7-8pm, woke up around 1am with dyspnea, no chest pain, no arm pain, no dizziness, no slurred speech, no sweating/diaphoresis, no vision changes, but did have upset stomach/nausea. Sat in recliner, took anxiety pill, checked pulse (normal), BG was 137. Tried pacing, no worsening of breathing with activity. Googled heart attacks and got worried. Wife had a colonoscopy 10am next morning so he didn't call for appt and was feeling better all day yesterday, but wanted to come in and make sure everything is okay. Azelastine spray (from ENT) is helping significantly with air flow through left nostril. Also noting growing bumpy skin lesion left forehead (in hair). Chronic back and right shoulder pain. Sleeping in recliner b/c of hx NELSON and intolerance to CPAP. REVIEW OF SYSTEMS       Complete review of systems negative except as noted in HPI.       Exam:   VS:    Visit Vitals    /72 (BP 1 Location: Right arm, BP Patient Position: Sitting)    Pulse 65    Temp 99.1 °F (37.3 °C) (Oral)    Resp 17    Ht 6' 3\" (1.905 m)    Wt 262 lb (118.8 kg)    SpO2 94%    BMI 32.75 kg/m2       General:   Pleasant age appropriate male, obese, appears to be feeling well, well-groomed, conversant, alert, in no acute distress. HEENT:  Normocephalic, atraumatic, MMM, PERRL, EOMI   Neck:  Neck supple with normal ROM for age, no thyromegaly, no LAD  Cardiovasc:   Regular rate and rhythm, no murmurs, no rubs, no gallops  Pulmonary:   Clear breath sounds bilaterally, good air movement,    No wheezing, no rales, no rhonchi, normal respiratory effort  Abdomen:   Abdomen soft, nontender, nondistended, NABS, no masses  Extremities:   No dependent edema, no tenderness with palpation of calves,     Warm and well-perfused at distal extremities      No results found for this or any previous visit (from the past 12 hour(s)). Encounter Diagnoses:     Encounter Diagnoses     ICD-10-CM ICD-9-CM   1. Shortness of breath R06.02 786.05   2. Essential hypertension I10 401.9   3. Non-insulin dependent type 2 diabetes mellitus (HCC) E11.9 250.00   4. Skin lesion of face L98.9 709.9       Assessment/Plan:       Deborah Cordova was seen today for evaluation of episode of nocturnal dyspnea and nausea. Likely GI from eating in the late evening. EKG today NSR with no concerning abnormalities. Referral to derm for enlarging skin lesion left forehead/temple. Encouraged pt to continue working on lifestyle changes for better BG control. Dayne Tobin was seen today for shortness of breath and nausea. Diagnoses and all orders for this visit:    Shortness of breath  -     AMB POC EKG ROUTINE W/ 12 LEADS, INTER & REP    Essential hypertension    Non-insulin dependent type 2 diabetes mellitus ([address]    Skin lesion of face  -     REFERRAL TO DERMATOLOGY        The patient states understanding and agrees with the treatment plan. All questions were answered.     Anil Mar MD - Internal Medicine  6/23/2017, 11:16 AM    Patient Care Team:  Anil Mar MD as PCP - General (Internal Medicine)  Skye Blas RN as Nurse Navigator  Kinsey Pepper MD as Consulting Provider (Orthopedic Surgery)  Margaret Reddy MD (General Surgery)  Nathan Hilton MD (Vascular Surgery)  Owen Zamora DO as Consulting Provider (Physical Medicine and Rehab)  Shaila Conklin MD as Consulting Provider (Neurosurgery)        Additional History     Past Medical History:   Diagnosis Date    Abrasion of skin 1/25/2016    Acid reflux     ACP (advance care planning)     has not yet filled out ACP, discussed 4/1/16    Aneurysm ([address]     Aortic root dilation ([address]     Borderline on echo June 8 2015 (done through [facility])    Arthritis     Avascular necrosis of bones of both hips ([address] 4/19/16    MRI L-spine revealed bialteral AVN femoral heads (MRI/CT dx, report submitted for scannin)    Avascular necrosis of femoral head ([address] 2/2015    BILATERAL, noted on CT 2/24/15 ([facility]) - Avascular necrosis involving both femoral heads. No femoral head collapse identified.     Back pain, lumbosacral     Back pain, thoracic     BMI 33.0-33.9,adult     BMI 33.0-33.9,adult     Bone lesion left femur     Asymptomatic, MRI planned for October    Carpal tunnel syndrome, bilateral     Cervical spondylosis     C4-C6    Chronic pain     Compression fracture of thoracic vertebra ([address] 1/2016    DDD (degenerative disc disease), lumbosacral     Dental disorder     Diabetes ([address]     Elevated transaminase level     AST and ALT     Essential hypertension     GERD (gastroesophageal reflux disease)     Herniated nucleus pulposus     Lumbar diskectomy 1980, C4/5 HNP, has gotten ESIs in the past    History of poliomyelitis     History of shingles 7/2013    Right flank/back, after having vaccine    Hypercholesterolemia     Hypogonadism male     Impingement syndrome of left shoulder 8/2013    Internal hemorrhoids     Long term (current) use of anticoagulants     baby asa    NELSON (obstructive sleep apnea)     Unable to tolerate CPAP    Restless leg syndrome     S/P AAA repair     Dr. Светлана Major following, q3 year duplex of aorta    Seasonal allergic reaction     Type 2 diabetes mellitus, controlled ([facility]      Past Surgical History:   Procedure Laterality Date    HX LUMBAR DISKECTOMY N/A 1980    HX ORTHOPAEDIC      left shoulder    VASCULAR SURGERY PROCEDURE UNLIST N/A     AAA repair with stent, [facility]     Social History   Substance Use Topics    Smoking status: Former Smoker     Types: Cigarettes, Pipe    Smokeless tobacco: Former User      Comment: Quit 43 years ago (2015), pipe 10+ years ago    Alcohol use 0.0 oz/week     0 Glasses of wine per week      Comment: Occasional beer, liquor or wine. No heavy use     Current Outpatient Prescriptions   Medication Sig    Azelastine (ASTEPRO) 0.15 % (205.5 mcg) nasal spray two (2) times a day.  glipiZIDE (GLUCOTROL) 10 mg tablet Take 1 Tab by mouth two (2) times a day.  ALPRAZolam (XANAX) 0.25 mg tablet Take 1 Tab by mouth two (2) times daily as needed for Anxiety. Max Daily Amount: 0.5 mg. Indications: anxiety    glucose blood VI test strips (FREESTYLE LITE STRIPS) strip ICD10 E11.65 - DM2 daily BG testing.  OTHER Heel cup  Indications: heel pain    telmisartan-hydrochlorothiazide (MICARDIS HCT) 40-12.5 mg per tablet Take 1 Tab by mouth daily.  metFORMIN ER (GLUCOPHAGE XR) 750 mg tablet Take 1 Tab by mouth two (2) times a day.  pramipexole (MIRAPEX) 0.5 mg tablet Take 1 Tab by mouth three (3) times daily as needed. Indications: RESTLESS LEGS SYNDROME    atorvastatin (LIPITOR) 80 mg tablet Take 1 Tab by mouth daily. Take 80 mg by Mouth Once a Day.  Lancets misc Uncontrolled DM2. BID testing. Disp 2 boxes of 100 each with 3 refills for 90 day supply.  glucose blood VI test strips (BLOOD GLUCOSE TEST) strip Uncontrolled DM2. BID testing.  Diabetic Supplies, [address] (LANCING DEVICE WITH LANCETS) misc DM2 uncontrolled - BG testing BID.     Blood-Glucose Meter misc BG meter for monitoring BG - ICD 9 250.00 - Diabetes mellitus type 2 uncontrolled - BID testing.  olopatadine (PATANOL) 0.1 % ophthalmic solution Administer 2 Drops to both eyes two (2) times daily as needed for Allergies.  omeprazole (PRILOSEC) 20 mg capsule Take 1 Cap by mouth daily.  LEVITRA 10 mg tablet Take 10 mg by mouth as needed.  aspirin 81 mg chewable tablet Take 81 mg by Mouth Once a Day.  omega-3 fatty acids-vitamin e (FISH OIL) 1,000 mg cap Take 1 Cap by mouth two (2) times a day.  meloxicam (MOBIC) 7.5 mg tablet Take 1 Tab by mouth daily as needed for Pain (arthritis).  fexofenadine-pseudoephedrine (ALLEGRA-D 24 HOUR) 180-240 mg per tablet Take 1 Tab by mouth daily.  naproxen (NAPROSYN) 500 mg tablet Take 1 Tab by mouth two (2) times daily (with meals). Take 1 Tab by Mouth Twice Daily.  oxyCODONE-acetaminophen (PERCOCET) 5-325 mg per tablet Take 1 Tab by mouth every six (6) hours as needed for Pain. Max Daily Amount: 4 Tabs.  mometasone (NASONEX) 50 mcg/actuation nasal spray 2 Sprays by Both Nostrils route daily.  triamcinolone (ARISTOCORT) 0.5 % topical cream Apply  to affected area two (2) times a day. use thin layer to affected area    carboxymethylcellulose sodium 1 % DLGl Apply  to eye. No current facility-administered medications for this visit. Allergies   Allergen Reactions    Zocor [Simvastatin] Myalgia     Tolerating high dose Atorvastatin            This document may have been created with the aid of dictation software. Text may contain errors, particularly phonetic errors.

## 2017-06-23 NOTE — MR AVS SNAPSHOT
Visit Information Date & Time Provider Department Dept. Phone Encounter #  
 6/23/2017 11:00 AM Krish Client, [address] Chris Perrin [phone] [id number] Upcoming Health Maintenance Date Due DTaP/Tdap/Td series (1 - Tdap) 10/6/1971 INFLUENZA AGE 9 TO ADULT 8/1/2017 HEMOGLOBIN A1C Q6M 10/25/2017 FOOT EXAM Q1 1/16/2018 MICROALBUMIN Q1 1/16/2018 Pneumococcal 65+ Low/Medium Risk (2 of 2 - PPSV23) 1/17/2018 EYE EXAM RETINAL OR DILATED Q1 3/3/2018 LIPID PANEL Q1 4/25/2018 MEDICARE YEARLY EXAM 5/4/2018 GLAUCOMA SCREENING Q2Y 3/3/2019 COLONOSCOPY 5/4/2025 Allergies as of 6/23/2017  Review Complete On: 6/23/2017 By: Krish Snell MD  
  
 Severity Noted Reaction Type Reactions Zocor [Simvastatin] Medium 11/01/2013   Side Effect Myalgia Tolerating high dose Atorvastatin Current Immunizations  Reviewed on 1/17/2017 Name Date Influenza Vaccine 10/3/2016, 9/2/2015  9:29 AM, 10/8/2014, 10/2/2013, 9/24/2012 Pneumococcal Conjugate (PCV-13) 1/17/2017 Zoster Vaccine, Live 9/24/2012 Not reviewed this visit You Were Diagnosed With   
  
 Codes Comments Shortness of breath    -  Primary ICD-10-CM: R06.02 
ICD-9-CM: 786.05 Essential hypertension     ICD-10-CM: I10 
ICD-9-CM: 401.9 Non-insulin dependent type 2 diabetes mellitus ([facility]     ICD-10-CM: E11.9 ICD-9-CM: 250.00 Skin lesion of face     ICD-10-CM: L98.9 ICD-9-CM: 709.9 Vitals BP Pulse Temp Resp Height(growth percentile) Weight(growth percentile) 121/72 (BP 1 Location: Right arm, BP Patient Position: Sitting) 65 99.1 °F (37.3 °C) (Oral) 17 6' 3\" (1.905 m) 262 lb (118.8 kg) SpO2 BMI Smoking Status 94% 32.75 kg/m2 Former Smoker Vitals History BMI and BSA Data Body Mass Index Body Surface Area 32.75 kg/m 2 2.51 m 2 Preferred Pharmacy Pharmacy Name Phone [address] [place] [address], [address] [phone] Your Updated Medication List  
  
   
This list is accurate as of: 6/23/17 11:50 AM.  Always use your most recent med list.  
  
  
  
  
 ALPRAZolam 0.25 mg tablet Commonly known as:  Betsy Concepcion Take 1 Tab by mouth two (2) times daily as needed for Anxiety. Max Daily Amount: 0.5 mg. Indications: anxiety  
  
 aspirin 81 mg chewable tablet Take 81 mg by Mouth Once a Day. atorvastatin 80 mg tablet Commonly known as:  LIPITOR Take 1 Tab by mouth daily. Take 80 mg by Mouth Once a Day. Azelastine 0.15 % (205.5 mcg) nasal spray Commonly known as:  ASTEPRO  
two (2) times a day. Blood-Glucose Meter Misc  
BG meter for monitoring BG - ICD 9 250.00 - Diabetes mellitus type 2 uncontrolled - BID testing. carboxymethylcellulose sodium 1 % Dlgl Apply  to eye. Diabetic Supp[facility], [facility] Mis Commonly known as:  Lancing Device with Lancets DM2 uncontrolled - BG testing BID. fexofenadine-pseudoephedrine 180-240 mg per tablet Commonly known as:  ALLEGRA-D 24 HOUR Take 1 Tab by mouth daily. glipiZIDE 10 mg tablet Commonly known as:  Odilia Lover Take 1 Tab by mouth two (2) times a day. * glucose blood VI test strips strip Commonly known as:  blood glucose test  
Uncontrolled DM2. BID testing. * glucose blood VI test strips strip Commonly known as:  FREESTYLE LITE STRIPS  
ICD10 E11.65 - DM2 daily BG testing. Lancets Misc Uncontrolled DM2. BID testing. Disp 2 boxes of 100 each with 3 refills for 90 day supply. LEVITRA 10 mg tablet Generic drug:  vardenafil Take 10 mg by mouth as needed. meloxicam 7.5 mg tablet Commonly known as:  MOBIC Take 1 Tab by mouth daily as needed for Pain (arthritis). metFORMIN  mg tablet Commonly known as:  GLUCOPHAGE XR Take 1 Tab by mouth two (2) times a day. mometasone 50 mcg/actuation nasal spray Commonly known as:  NASONEX  
2 Sprays by Both Nostrils route daily. naproxen 500 mg tablet Commonly known as:  NAPROSYN Take 1 Tab by mouth two (2) times daily (with meals). Take 1 Tab by Mouth Twice Daily. olopatadine 0.1 % ophthalmic solution Commonly known as:  PATANOL Administer 2 Drops to both eyes two (2) times daily as needed for Allergies. omega-3 fatty acids-vitamin e 1,000 mg Cap Commonly known as:  FISH OIL Take 1 Cap by mouth two (2) times a day. omeprazole 20 mg capsule Commonly known as:  PRILOSEC Take 1 Cap by mouth daily. OTHER Heel cup  Indications: heel pain  
  
 oxyCODONE-acetaminophen 5-325 mg per tablet Commonly known as:  PERCOCET Take 1 Tab by mouth every six (6) hours as needed for Pain. Max Daily Amount: 4 Tabs. pramipexole 0.5 mg tablet Commonly known as:  MIRAPEX Take 1 Tab by mouth three (3) times daily as needed. Indications: RESTLESS LEGS SYNDROME  
  
 telmisartan-hydroCHLOROthiazide 40-12.5 mg per tablet Commonly known as:  MICARDIS HCT Take 1 Tab by mouth daily. triamcinolone 0.5 % topical cream  
Commonly known as:  ARISTOCORT Apply  to affected area two (2) times a day. use thin layer to affected area * Notice: This list has 2 medication(s) that are the same as other medications prescribed for you. Read the directions carefully, and ask your doctor or other care provider to review them with you. We Performed the Following AMB POC EKG ROUTINE W/ 12 LEADS, INTER & REP [91629 CPT(R)] REFERRAL TO DERMATOLOGY [REF19 Custom] Comments:  
 Please evaluate patient for increasing verrucous lesion on left temple (in hair line). Referral Information Referral ID Referred By Referred To  
  
 [id number] Lisbet [facility] [address] [facility] [address] [facility], [address] [facility] [facility] [address] Phone: [phone] Fax: [phone] Visits Status Start Date End Date 1 New Request 6/23/17 6/23/18 If your referral has a status of pending review or denied, additional information will be sent to support the outcome of this decision. Patient Instructions STAY UP TO DATE WITH YOUR PREVENTIVE HEALTH:    
Please review the following recommendations and if you are not sure that your healthcare is up to date, ask your provider at your next scheduled office visit. -- Yearly preventive visits (sometimes called \"physicals\") are recommended for all adults. Medicare and Medicaid do not pay for physicals. Medicare covers a yearly wellness visit that differs in many ways from a traditional physical, but is an opportunity to make sure you are maximizing the preventive services that will be covered by Medicare. Each insurance carrier will have different regulations about what services may be covered, so be sure to talk with your insurance provider before scheduling a visit. -- Colon cancer screening is recommended for all patients 48 and older with either colonoscopy (interval will vary depending on results) or yearly stool testing.   
 
-- Mammogram is recommended every 2 years for women ages 36 to 76. -- Pap smear is recommended every 3-5 years for women aged 24 to 72, unless your cervix has been surgically removed for benign reasons. -- Flu shot is recommended every fall for adults of all ages. -- Prevnar 15 is recommended at the age of 72 for all adults. -- Pneumovax is recommended one year after Prevnar 13 for all adults, but earlier if you have a history of chronic health problems (including diabetes and asthma) or smoking. -- Tetanus boosters are recommended every 10 years for adults of all ages. Medicare and Medicaid do not cover tetanus as a preventive booster, but will pay for patients to receive a booster in the event of certain injuries. INSTRUCTIONS AFTER YOUR VISIT TODAY:  
 
-- Try not to eat within 4 hours of going to sleep. -- REFERRALS - If you were referred to a specialist or consultant today, this often needs to be authorized by your insurance company before it can be scheduled. You should be contacted within 2 weeks by the consultant's office to schedule the visit. If you do not hear from the specialist's office in that time frame, please call my office and the staff here can check on the status of your referral.   
 
 
TESTING RESULTS  
 
-- Generally lab results can be obtained by logging into your [employer] account. If your insurance requires use of an outside lab facility, your results may not be visible on your [employer] account. If you need assistance with accessing your account, you can call the [address] [place] [address] at #[phone].  
 
-- Lab results may become available for your review on [employer] before your provider has an opportunity to write you a note about results. Review of testing results will generally be done in 10-14 days. Please save your inquiries about testing results until this time frame has passed. -- If you have testing done outside of the office, please call to let us know the date and location that your testing was completed. Results can often be obtained faster if an inquiry is run. MEDICATION REFILLS   
 
-- Controlled substance refills (medications that require printed prescriptions for monitoring of use per [facility] guidelines) must be picked up in the office and per medical group policy will require a scheduled office visit with the provider. Calling the after hours answering service to request a controlled substance represents a violation of [facility] controlled substance policy. -- All other medication refills are to be requested by calling your pharmacy during regular office hours.   The after hours physician on call is not required to respond to calls about medication refills. It is your responsibility to keep track of when you may be running out of medication and to place refill requests at least 3 business days prior. [facility] Scheduling appointments can be done at the  or by calling [phone] and selecting option #1. HOLIDAY CLOSURES:  
 
The office is closed on six major holidays each year:  New Year's Day, July 4th, Memorial Day, Labor Day, Thanksgiving, and [place] Day. Lab and X-ray services are not available on those days. Introducing [facility]! Dylan Christie introduces [employer] patient portal. Now you can access parts of your medical record, email your doctor's office, and request medication refills online. 1. In your internet browser, go to [email or web address] 2. Click on the First Time User? Click Here link in the Sign In box. You will see the New Member Sign Up page. 3. Enter your [employer] Access Code exactly as it appears below. You will not need to use this code after youve completed the sign-up process. If you do not sign up before the expiration date, you must request a new code. · [employer] Access Code: [email or web address] Expires: 7/27/2017  4:28 PM 
 
4. Enter the last four digits of your Social Security Number (xxxx) and Date of Birth (mm/dd/yyyy) as indicated and click Submit. You will be taken to the next sign-up page. 5. Create a [employer] ID. This will be your [employer] login ID and cannot be changed, so think of one that is secure and easy to remember. 6. Create a [employer] password. You can change your password at any time. 7. Enter your Password Reset Question and Answer. This can be used at a later time if you forget your password. 8. Enter your e-mail address. You will receive e-mail notification when new information is available in [address] 9. Click Sign Up. You can now view and download portions of your medical record. 10. Click the Download Summary menu link to download a portable copy of your medical information. If you have questions, please visit the Frequently Asked Questions section of the [employer] website. Remember, [employer] is NOT to be used for urgent needs. For medical emergencies, dial 911. Now available from your iPhone and Android! Please provide this summary of care documentation to your next provider. Your primary care clinician is listed as Maye Quiñones. If you have any questions after today's visit, please call [phone].

## 2017-06-23 NOTE — PROGRESS NOTES
1. Have you been to the ER, urgent care clinic since your last visit? Hospitalized since your last visit? No    2. Have you seen or consulted any other health care providers outside of the [address] [place] [address] since your last visit? Include any pap smears or colon screening.  Yes When: 6/9/17

## 2017-07-27 ENCOUNTER — HOSPITAL ENCOUNTER (OUTPATIENT)
Dept: LAB | Age: 67
Discharge: HOME | End: 2017-07-27
Payer: MEDICARE

## 2017-07-27 DIAGNOSIS — E16.2 HYPOGLYCEMIA: Chronic | ICD-10-CM

## 2017-07-27 DIAGNOSIS — I10 ESSENTIAL HYPERTENSION: Chronic | ICD-10-CM

## 2017-07-27 DIAGNOSIS — E11.9 NON-INSULIN DEPENDENT TYPE 2 DIABETES MELLITUS (HCC): Chronic | ICD-10-CM

## 2017-07-27 DIAGNOSIS — E11.42 DIABETIC POLYNEUROPATHY ASSOCIATED WITH TYPE 2 DIABETES MELLITUS (HCC): Chronic | ICD-10-CM

## 2017-07-27 LAB
ALBUMIN SERPL BCP-MCNC: 3.8 G/DL (ref 3.4–5)
ALBUMIN/GLOB SERPL: 1.1 {RATIO} (ref 0.8–1.7)
ALP SERPL-CCNC: 79 U/L (ref 45–117)
ALT SERPL-CCNC: 33 U/L (ref 16–61)
ANION GAP BLD CALC-SCNC: 7 MMOL/L (ref 3–18)
AST SERPL W P-5'-P-CCNC: 19 U/L (ref 15–37)
BILIRUB SERPL-MCNC: 0.5 MG/DL (ref 0.2–1)
BUN SERPL-MCNC: 19 MG/DL (ref 7–18)
BUN/CREAT SERPL: 15 (ref 12–20)
CALCIUM SERPL-MCNC: 9.4 MG/DL (ref 8.5–10.1)
CHLORIDE SERPL-SCNC: 104 MMOL/L (ref 100–108)
CO2 SERPL-SCNC: 29 MMOL/L (ref 21–32)
CREAT SERPL-MCNC: 1.23 MG/DL (ref 0.6–1.3)
EST. AVERAGE GLUCOSE BLD GHB EST-MCNC: 160 MG/DL
GLOBULIN SER CALC-MCNC: 3.5 G/DL (ref 2–4)
GLUCOSE SERPL-MCNC: 122 MG/DL (ref 74–99)
HBA1C MFR BLD: 7.2 % (ref 4.2–5.6)
POTASSIUM SERPL-SCNC: 4.2 MMOL/L (ref 3.5–5.5)
PROT SERPL-MCNC: 7.3 G/DL (ref 6.4–8.2)
SODIUM SERPL-SCNC: 140 MMOL/L (ref 136–145)

## 2017-07-27 PROCEDURE — 36415 COLL VENOUS BLD VENIPUNCTURE: CPT | Performed by: INTERNAL MEDICINE

## 2017-07-27 PROCEDURE — 80053 COMPREHEN METABOLIC PANEL: CPT | Performed by: INTERNAL MEDICINE

## 2017-07-27 PROCEDURE — 83036 HEMOGLOBIN GLYCOSYLATED A1C: CPT | Performed by: INTERNAL MEDICINE

## 2017-08-09 ENCOUNTER — OFFICE VISIT (OUTPATIENT)
Dept: FAMILY MEDICINE CLINIC | Age: 67
End: 2017-08-09

## 2017-08-09 VITALS
HEART RATE: 49 BPM | HEIGHT: 75 IN | TEMPERATURE: 98.1 F | BODY MASS INDEX: 32.67 KG/M2 | WEIGHT: 262.8 LBS | RESPIRATION RATE: 16 BRPM | DIASTOLIC BLOOD PRESSURE: 72 MMHG | OXYGEN SATURATION: 95 % | SYSTOLIC BLOOD PRESSURE: 110 MMHG

## 2017-08-09 DIAGNOSIS — E78.2 MIXED HYPERLIPIDEMIA: ICD-10-CM

## 2017-08-09 DIAGNOSIS — E11.9 DIABETES MELLITUS WITHOUT COMPLICATION (HCC): Primary | ICD-10-CM

## 2017-08-09 DIAGNOSIS — I10 ESSENTIAL HYPERTENSION: ICD-10-CM

## 2017-08-09 NOTE — PROGRESS NOTES
HISTORY OF PRESENT ILLNESS  Cathi Armando is a 74 y.o. male. HPI  DM, controlled, his recent labs noted, A1c 7.2, glucose 122, but he is having hypoglycemic episodes where glucose is down to 50's, improve with drinking some juice, he is on glucotrol BID, he recently started exercising regularly  HTN, stable, bp is well controlled on meds daily  HLD, well controlled on lipitor, last LDl was 84  Review of Systems   Respiratory: Negative for shortness of breath. Cardiovascular: Negative for chest pain and palpitations. Gastrointestinal: Negative for nausea. Neurological: Negative for headaches. Physical Exam   Constitutional: He is oriented to person, place, and time. He appears well-developed and well-nourished. Cardiovascular: Normal rate, regular rhythm and normal heart sounds. Pulmonary/Chest: Effort normal and breath sounds normal. He has no rales. Abdominal: Soft. There is no tenderness. Musculoskeletal: He exhibits no edema. Neurological: He is alert and oriented to person, place, and time. Vitals reviewed. ASSESSMENT and PLAN  Diagnoses and all orders for this visit:    1. Diabetes mellitus without complication ([address] he was advised to lower glipizide dose to 5 mg po BID ( he will take half a tablet of his 10 mg tabs BID), monitor glucose, call if still having glucose less than 80    2. Essential hypertension, stable    3.  Mixed hyperlipidemia, stable  Continue other meds, continue exercise    Follow up with pcp in 3 mos    Lab Results   Component Value Date/Time    Cholesterol, total 150 04/25/2017 07:37 AM    HDL Cholesterol 32 04/25/2017 07:37 AM    LDL, calculated 84.6 04/25/2017 07:37 AM    VLDL, calculated 33.4 04/25/2017 07:37 AM    Triglyceride 167 04/25/2017 07:37 AM    CHOL/HDL Ratio 4.7 04/25/2017 07:37 AM     Lab Results   Component Value Date/Time    Sodium 140 07/27/2017 07:52 AM    Potassium 4.2 07/27/2017 07:52 AM    Chloride 104 07/27/2017 07:52 AM    CO2 29 07/27/2017 07:52 AM    Anion gap 7 07/27/2017 07:52 AM    Glucose 122 07/27/2017 07:52 AM    BUN 19 07/27/2017 07:52 AM    Creatinine 1.23 07/27/2017 07:52 AM    BUN/Creatinine ratio 15 07/27/2017 07:52 AM    GFR est AA >60 07/27/2017 07:52 AM    GFR est non-AA 59 07/27/2017 07:52 AM    Calcium 9.4 07/27/2017 07:52 AM    Bilirubin, total 0.5 07/27/2017 07:52 AM    AST (SGOT) 19 07/27/2017 07:52 AM    Alk.  phosphatase 79 07/27/2017 07:52 AM    Protein, total 7.3 07/27/2017 07:52 AM    Albumin 3.8 07/27/2017 07:52 AM    Globulin 3.5 07/27/2017 07:52 AM    A-G Ratio 1.1 07/27/2017 07:52 AM    ALT (SGPT) 33 07/27/2017 07:52 AM

## 2017-08-09 NOTE — PROGRESS NOTES
Onelia Berry is a 74 y.o. male  Pt here today for a follow up visit. 1. Have you been to the ER, urgent care clinic since your last visit? Hospitalized since your last visit? No    2. Have you seen or consulted any other health care providers outside of the [address] [place] [address] since your last visit? Include any pap smears or colon screening.  Yes Where: podiatry

## 2017-08-15 DIAGNOSIS — K21.9 GASTROESOPHAGEAL REFLUX DISEASE WITHOUT ESOPHAGITIS: ICD-10-CM

## 2017-08-15 RX ORDER — OMEPRAZOLE 20 MG/1
20 CAPSULE, DELAYED RELEASE ORAL DAILY
Qty: 90 CAP | Refills: 0 | Status: SHIPPED | OUTPATIENT
Start: 2017-08-15 | End: 2017-12-20 | Stop reason: SDUPTHER

## 2017-09-06 ENCOUNTER — OFFICE VISIT (OUTPATIENT)
Dept: FAMILY MEDICINE CLINIC | Age: 67
End: 2017-09-06

## 2017-09-06 VITALS
BODY MASS INDEX: 32.65 KG/M2 | WEIGHT: 262.6 LBS | RESPIRATION RATE: 18 BRPM | OXYGEN SATURATION: 98 % | HEIGHT: 75 IN | DIASTOLIC BLOOD PRESSURE: 84 MMHG | HEART RATE: 48 BPM | TEMPERATURE: 97.2 F | SYSTOLIC BLOOD PRESSURE: 139 MMHG

## 2017-09-06 DIAGNOSIS — Z23 ENCOUNTER FOR IMMUNIZATION: ICD-10-CM

## 2017-09-06 DIAGNOSIS — M51.37 DDD (DEGENERATIVE DISC DISEASE), LUMBOSACRAL: ICD-10-CM

## 2017-09-06 DIAGNOSIS — M17.11 PRIMARY OSTEOARTHRITIS OF RIGHT KNEE: Primary | Chronic | ICD-10-CM

## 2017-09-06 RX ORDER — TRIAMCINOLONE ACETONIDE 40 MG/ML
40 INJECTION, SUSPENSION INTRA-ARTICULAR; INTRAMUSCULAR ONCE
Qty: 1 ML | Refills: 0
Start: 2017-09-06 | End: 2017-09-06

## 2017-09-06 NOTE — PROGRESS NOTES
Jeremy Cabrera is a 74 y.o. male (: 1950) presenting to address:    Chief Complaint   Patient presents with    Knee Pain     right knee discomfort        Vitals:    17 [phone]   BP: 139/84   Pulse: (!) 48   Resp: 18   Temp: 97.2 °F (36.2 °C)   TempSrc: Oral   SpO2: 98%   Weight: 262 lb 9.6 oz (119.1 kg)   Height: 6' 3\" (1.905 m)   PainSc:   4   PainLoc: Knee       Hearing/Vision:   No exam data present    Learning Assessment:     Learning Assessment 2015   PRIMARY LEARNER Patient   HIGHEST LEVEL OF EDUCATION - PRIMARY LEARNER  2 YEARS OF COLLEGE   BARRIERS PRIMARY LEARNER NONE   CO-LEARNER CAREGIVER No   PRIMARY LANGUAGE ENGLISH   LEARNER PREFERENCE PRIMARY DEMONSTRATION   ANSWERED BY Bre Goss   RELATIONSHIP SELF     Depression Screening:     PHQ over the last two weeks 2017   Little interest or pleasure in doing things Not at all   Feeling down, depressed or hopeless Not at all   Total Score PHQ 2 0     Fall Risk Assessment:     Fall Risk Assessment, last 12 mths 2017   Able to walk? Yes   Fall in past 12 months? No   Fall with injury? -   Number of falls in past 12 months -   Fall Risk Score -     Abuse Screening:     Abuse Screening Questionnaire 2017   Do you ever feel afraid of your partner? N   Are you in a relationship with someone who physically or mentally threatens you? N   Is it safe for you to go home? Y     Coordination of Care Questionaire:   1. Have you been to the ER, urgent care clinic since your last visit? Hospitalized since your last visit? NO    2. Have you seen or consulted any other health care providers outside of the [address] [place] [address] since your last visit? Include any pap smears or colon screening. NO    Advanced Directive:   1. Do you have an Advanced Directive? NO    2. Would you like information on Advanced Directives? NO    High Dose flu Immunization/s administered 2017 by Ghulam Moeller LPN with guardian's consent.     Patient tolerated procedure well. No reactions noted.

## 2017-09-06 NOTE — MR AVS SNAPSHOT
Visit Information Date & Time Provider Department Dept. Phone Encounter #  
 9/6/2017 10:20 AM Rachael Arechiga MD [address] [place] [address] [phone] [id number] Upcoming Health Maintenance Date Due DTaP/Tdap/Td series (1 - Tdap) 10/6/1971 FOOT EXAM Q1 1/16/2018 MICROALBUMIN Q1 1/16/2018 Pneumococcal 65+ Low/Medium Risk (2 of 2 - PPSV23) 1/17/2018 HEMOGLOBIN A1C Q6M 1/27/2018 EYE EXAM RETINAL OR DILATED Q1 3/3/2018 LIPID PANEL Q1 4/25/2018 MEDICARE YEARLY EXAM 5/4/2018 GLAUCOMA SCREENING Q2Y 3/3/2019 COLONOSCOPY 5/4/2025 Allergies as of 9/6/2017  Review Complete On: 9/6/2017 By: Jann Grubbs LPN Severity Noted Reaction Type Reactions Zocor [Simvastatin] Medium 11/01/2013   Side Effect Myalgia Tolerating high dose Atorvastatin Current Immunizations  Reviewed on 1/17/2017 Name Date Influenza High Dose Vaccine PF 9/6/2017 10:14 AM  
 Influenza Vaccine 10/3/2016, 9/2/2015  9:29 AM, 10/8/2014, 10/2/2013, 9/24/2012 Pneumococcal Conjugate (PCV-13) 1/17/2017 Zoster Vaccine, Live 9/24/2012 Not reviewed this visit You Were Diagnosed With   
  
 Codes Comments Primary osteoarthritis of right knee    -  Primary ICD-10-CM: M17.11 ICD-9-CM: 715.16   
 DDD (degenerative disc disease), lumbosacral     ICD-10-CM: M51.37 
ICD-9-CM: 722.52 Encounter for immunization     ICD-10-CM: A99 ICD-9-CM: V03.89 Vitals BP Pulse Temp Resp Height(growth percentile) Weight(growth percentile) 139/84 (BP 1 Location: Right arm, BP Patient Position: Sitting) (!) 48 97.2 °F (36.2 °C) (Oral) 18 6' 3\" (1.905 m) 262 lb 9.6 oz (119.1 kg) SpO2 BMI Smoking Status 98% 32.82 kg/m2 Former Smoker Vitals History BMI and BSA Data Body Mass Index Body Surface Area  
 32.82 kg/m 2 2.51 m 2 Preferred Pharmacy Pharmacy Name Phone [address] [phone] Your Updated Medication List  
  
   
This list is accurate as of: 9/6/17 10:22 AM.  Always use your most recent med list.  
  
  
  
  
 ALPRAZolam 0.25 mg tablet Commonly known as:  Ana Kristine Take 1 Tab by mouth two (2) times daily as needed for Anxiety. Max Daily Amount: 0.5 mg. Indications: anxiety  
  
 aspirin 81 mg chewable tablet Take 81 mg by Mouth Once a Day. atorvastatin 80 mg tablet Commonly known as:  LIPITOR Take 1 Tab by mouth daily. Take 80 mg by Mouth Once a Day. Azelastine 0.15 % (205.5 mcg) nasal spray Commonly known as:  ASTEPRO  
two (2) times a day. Blood-Glucose Meter Misc  
BG meter for monitoring BG - ICD 9 250.00 - Diabetes mellitus type 2 uncontrolled - BID testing. carboxymethylcellulose sodium 1 % Dlgl Apply  to eye. Diabetic Supplies, [facility]. [facility] [place] Commonly known as:  Lancing Device with Lancets DM2 uncontrolled - BG testing BID. fexofenadine-pseudoephedrine 180-240 mg per tablet Commonly known as:  ALLEGRA-D 24 HOUR Take 1 Tab by mouth daily. glipiZIDE 10 mg tablet Commonly known as:  Nicholas Schillings Take 1 Tab by mouth two (2) times a day. * glucose blood VI test strips strip Commonly known as:  blood glucose test  
Uncontrolled DM2. BID testing. * glucose blood VI test strips strip Commonly known as:  FREESTYLE LITE STRIPS  
ICD10 E11.65 - DM2 daily BG testing. Lancets Misc Uncontrolled DM2. BID testing. Disp 2 boxes of 100 each with 3 refills for 90 day supply. LEVITRA 10 mg tablet Generic drug:  vardenafil Take 10 mg by mouth as needed. meloxicam 7.5 mg tablet Commonly known as:  MOBIC Take 1 Tab by mouth daily as needed for Pain (arthritis). metFORMIN  mg tablet Commonly known as:  GLUCOPHAGE XR Take 1 Tab by mouth two (2) times a day. mometasone 50 mcg/actuation nasal spray Commonly known as:  NASONEX  
2 Sprays by Both Nostrils route daily. naproxen 500 mg tablet Commonly known as:  NAPROSYN Take 1 Tab by mouth two (2) times daily (with meals). Take 1 Tab by Mouth Twice Daily. olopatadine 0.1 % ophthalmic solution Commonly known as:  PATANOL Administer 2 Drops to both eyes two (2) times daily as needed for Allergies. omega-3 fatty acids-vitamin e 1,000 mg Cap Commonly known as:  FISH OIL Take 1 Cap by mouth two (2) times a day. omeprazole 20 mg capsule Commonly known as:  PRILOSEC Take 1 Cap by mouth daily. OTHER Heel cup  Indications: heel pain  
  
 oxyCODONE-acetaminophen 5-325 mg per tablet Commonly known as:  PERCOCET Take 1 Tab by mouth every six (6) hours as needed for Pain. Max Daily Amount: 4 Tabs. pramipexole 0.5 mg tablet Commonly known as:  MIRAPEX Take 1 Tab by mouth three (3) times daily as needed. Indications: RESTLESS LEGS SYNDROME  
  
 telmisartan-hydroCHLOROthiazide 40-12.5 mg per tablet Commonly known as:  MICARDIS HCT Take 1 Tab by mouth daily. * triamcinolone 0.5 % topical cream  
Commonly known as:  ARISTOCORT Apply  to affected area two (2) times a day. use thin layer to affected area * triamcinolone acetonide 40 mg/mL injection Commonly known as:  KENALOG  
1 mL by IntraMUSCular route once for 1 dose. * Notice: This list has 4 medication(s) that are the same as other medications prescribed for you. Read the directions carefully, and ask your doctor or other care provider to review them with you. We Performed the Following ADMIN INFLUENZA VIRUS VAC [ [facility]] INFLUENZA VIRUS VACCINE, HIGH DOSE SEASONAL, PRESERVATIVE FREE [02297 CPT(R)] NV DRAIN/INJECT LARGE JOINT/BURSA [id number] CPT(R)] TRIAMCINOLONE ACETONIDE INJ [ [facility]] Patient Instructions Joint Injections: Care Instructions Your Care Instructions Joint injections are shots into a joint, such as the knee. They may be used to put in medicines, such as pain relievers. Or they can be used to take out fluid. Sometimes the fluid is tested in a lab. This can help find the cause of a joint problem. A corticosteroid, or steroid, shot is used to reduce inflammation in tendons or joints. It is often used to treat problems such as arthritis, tendinitis, and bursitis. Steroids can be injected directly into a painful, inflamed joint. They can also help reduce inflammation of a bursa. A bursa is a sac of fluid. It cushions and lubricates areas where tendons, ligaments, skin, muscles, or bones rub against each other. A steroid shot can sometimes help with short-term pain relief when other treatments haven't worked. If steroid shots help, pain may improve for weeks or months. Follow-up care is a key part of your treatment and safety. Be sure to make and go to all appointments, and call your doctor if you are having problems. It's also a good idea to know your test results and keep a list of the medicines you take. How can you care for yourself at home? · Put ice or a cold pack on the area for 10 to 20 minutes at a time. Put a thin cloth between the ice and your skin. · Take anti-inflammatory medicines to reduce pain, swelling, or inflammation. These include ibuprofen (Advil, Motrin) and naproxen (Aleve). Read and follow all instructions on the label. · Avoid strenuous activities for several days, especially those that put stress on the area where you got the shot. · If you have dressings over the area, keep them clean and dry. You may remove them when your doctor tells you to. When should you call for help? Call your doctor now or seek immediate medical care if: 
· You have signs of infection, such as: 
¨ Increased pain, swelling, warmth, or redness. ¨ Red streaks leading from the site. ¨ Pus draining from the site. ¨ A fever. Watch closely for changes in your health, and be sure to contact your doctor if you have any problems. Where can you learn more? Go to http://zuleima-randa.info/. Enter N616 in the search box to learn more about \"Joint Injections: Care Instructions. \" Current as of: March 21, 2017 Content Version: 11.3 © 3572-2724 [employer]adach. Care instructions adapted under license by "[employer] (which disclaims liability or warranty for this information). If you have questions about a medical condition or this instruction, always ask your healthcare professional. [address] any warranty or liability for your use of this information. Knee Arthritis: Exercises Your Care Instructions Here are some examples of exercises for knee arthritis. Start each exercise slowly. Ease off the exercise if you start to have pain. Your doctor or physical therapist will tell you when you can start these exercises and which ones will work best for you. How to do the exercises Knee flexion with heel slide 1. Lie on your back with your knees bent. 2. Slide your heel back by bending your affected knee as far as you can. Then hook your other foot around your ankle to help pull your heel even farther back. 3. Hold for about 6 seconds, then rest for up to 10 seconds. 4. Repeat 8 to 12 times. 5. Switch legs and repeat steps 1 through 4, even if only one knee is sore. [employer] 1. Sit with your affected leg straight and supported on the floor or a firm bed. Place a small, rolled-up towel under your knee. Your other leg should be bent, with that foot flat on the floor. 2. Tighten the thigh muscles of your affected leg by pressing the back of your knee down into the towel. 3. Hold for about 6 seconds, then rest for up to 10 seconds. 4. Repeat 8 to 12 times. 5. Switch legs and repeat steps 1 through 4, even if only one knee is sore. Straight-leg raises to the front 1. Lie on your back with your good knee bent so that your foot rests flat on the floor. Your affected leg should be straight. Make sure that your low back has a normal curve. You should be able to slip your hand in between the floor and the small of your back, with your palm touching the floor and your back touching the back of your hand. 2. Tighten the thigh muscles in your affected leg by pressing the back of your knee flat down to the floor. Hold your knee straight. 3. Keeping the thigh muscles tight and your leg straight, lift your affected leg up so that your heel is about 12 inches off the floor. Hold for about 6 seconds, then lower slowly. 4. Relax for up to 10 seconds between repetitions. 5. Repeat 8 to 12 times. 6. Switch legs and repeat steps 1 through 5, even if only one knee is sore. Active knee flexion 1. Lie on your stomach with your knees straight. If your kneecap is uncomfortable, roll up a washcloth and put it under your leg just above your kneecap. 2. Lift the foot of your affected leg by bending the knee so that you bring the foot up toward your buttock. If this motion hurts, try it without bending your knee quite as far. This may help you avoid any painful motion. 3. Slowly move your leg up and down. 4. Repeat 8 to 12 times. 5. Switch legs and repeat steps 1 through 4, even if only one knee is sore. Quadriceps stretch (facedown) 1. Lie flat on your stomach, and rest your face on the floor. 2. Wrap a towel or belt strap around the lower part of your affected leg. Then use the towel or belt strap to slowly pull your heel toward your buttock until you feel a stretch. 3. Hold for about 15 to 30 seconds, then relax your leg against the towel or belt strap. 4. Repeat 2 to 4 times. 5. Switch legs and repeat steps 1 through 4, even if only one knee is sore. Stationary exercise bike If you do not have a stationary exercise bike at home, you can find one to ride at your local health club or community center. 1. Adjust the height of the bike seat so that your knee is slightly bent when your leg is extended downward. If your knee hurts when the pedal reaches the top, you can raise the seat so that your knee does not bend as much. 2. Start slowly. At first, try to do 5 to 10 minutes of cycling with little to no resistance. Then increase your time and the resistance bit by bit until you can do 20 to 30 minutes without pain. 3. If you start to have pain, rest your knee until your pain gets back to the level that is normal for you. Or cycle for less time or with less effort. Follow-up care is a key part of your treatment and safety. Be sure to make and go to all appointments, and call your doctor if you are having problems. It's also a good idea to know your test results and keep a list of the medicines you take. Where can you learn more? Go to http://zuleima-randa.info/. Enter C159 in the search box to learn more about \"Knee Arthritis: Exercises. \" Current as of: March 21, 2017 Content Version: 11.3 © 3960-4703 [employer]. Care instructions adapted under license by [employer] (which disclaims liability or warranty for this information). If you have questions about a medical condition or this instruction, always ask your healthcare professional. [address] any warranty or liability for your use of this information. Low Back Pain: Exercises Your Care Instructions Here are some examples of typical rehabilitation exercises for your condition. Start each exercise slowly. Ease off the exercise if you start to have pain. Your doctor or physical therapist will tell you when you can start these exercises and which ones will work best for you. How to do the exercises Press-up 6. Lie on your stomach, supporting your body with your forearms. 7. Press your elbows down into the floor to raise your upper back. As you do this, relax your stomach muscles and allow your back to arch without using your back muscles. As your press up, do not let your hips or pelvis come off the floor. 8. Hold for 15 to 30 seconds, then relax. 9. Repeat 2 to 4 times. Alternate arm and leg (bird dog) exercise Note: Do this exercise slowly. Try to keep your body straight at all times, and do not let one hip drop lower than the other. 6. Start on the floor, on your hands and knees. 7. Tighten your belly muscles. 8. Raise one leg off the floor, and hold it straight out behind you. Be careful not to let your hip drop down, because that will twist your trunk. 9. Hold for about 6 seconds, then lower your leg and switch to the other leg. 10. Repeat 8 to 12 times on each leg. 11. Over time, work up to holding for 10 to 30 seconds each time. 12. If you feel stable and secure with your leg raised, try raising the opposite arm straight out in front of you at the same time. Knee-to-chest exercise 7. Lie on your back with your knees bent and your feet flat on the floor. 8. Bring one knee to your chest, keeping the other foot flat on the floor (or keeping the other leg straight, whichever feels better on your lower back). 9. Keep your lower back pressed to the floor. Hold for at least 15 to 30 seconds. 10. Relax, and lower the knee to the starting position. 11. Repeat with the other leg. Repeat 2 to 4 times with each leg. 12. To get more stretch, put your other leg flat on the floor while pulling your knee to your chest. 
Curl-ups 6. Lie on the floor on your back with your knees bent at a 90-degree angle. Your feet should be flat on the floor, about 12 inches from your buttocks. 7. Cross your arms over your chest. If this bothers your neck, try putting your hands behind your neck (not your head), with your elbows spread apart. 8. Slowly tighten your belly muscles and raise your shoulder blades off the floor. 9. Keep your head in line with your body, and do not press your chin to your chest. 
10. Hold this position for 1 or 2 seconds, then slowly lower yourself back down to the floor. 11. Repeat 8 to 12 times. Pelvic tilt exercise 6. Lie on your back with your knees bent. 7. \"Brace\" your stomach. This means to tighten your muscles by pulling in and imagining your belly button moving toward your spine. You should feel like your back is pressing to the floor and your hips and pelvis are rocking back. 8. Hold for about 6 seconds while you breathe smoothly. 9. Repeat 8 to 12 times. Heel dig bridging 4. Lie on your back with both knees bent and your ankles bent so that only your heels are digging into the floor. Your knees should be bent about 90 degrees. 5. Then push your heels into the floor, squeeze your buttocks, and lift your hips off the floor until your shoulders, hips, and knees are all in a straight line. 6. Hold for about 6 seconds as you continue to breathe normally, and then slowly lower your hips back down to the floor and rest for up to 10 seconds. 7. Do 8 to 12 repetitions. Hamstring stretch in doorway 1. Lie on your back in a doorway, with one leg through the open door. 2. Slide your leg up the wall to straighten your knee. You should feel a gentle stretch down the back of your leg. 3. Hold the stretch for at least 15 to 30 seconds. Do not arch your back, point your toes, or bend either knee. Keep one heel touching the floor and the other heel touching the wall. 4. Repeat with your other leg. 5. Do 2 to 4 times for each leg. Hip flexor stretch 1. Kneel on the floor with one knee bent and one leg behind you. Place your forward knee over your foot. Keep your other knee touching the floor. 2. Slowly push your hips forward until you feel a stretch in the upper thigh of your rear leg. 3. Hold the stretch for at least 15 to 30 seconds. Repeat with your other leg. 4. Do 2 to 4 times on each side. Wall sit 1. Stand with your back 10 to 12 inches away from a wall. 2. Lean into the wall until your back is flat against it. 3. Slowly slide down until your knees are slightly bent, pressing your lower back into the wall. 4. Hold for about 6 seconds, then slide back up the wall. 5. Repeat 8 to 12 times. Follow-up care is a key part of your treatment and safety. Be sure to make and go to all appointments, and call your doctor if you are having problems. It's also a good idea to know your test results and keep a list of the medicines you take. Where can you learn more? Go to http://zuleima-randa.info/. Enter U480 in the search box to learn more about \"Low Back Pain: Exercises. \" Current as of: March 21, 2017 Content Version: 11.3 © 6602-3904 [employer]. Care instructions adapted under license by [employer] (which disclaims liability or warranty for this information). If you have questions about a medical condition or this instruction, always ask your healthcare professional. [place] [address] any warranty or liability for your use of this information. Introducing [facility]! Anabell Quinonez introduces Tyto patient portal. Now you can access parts of your medical record, email your doctor's office, and request medication refills online. 1. In your internet browser, go to [email or web address] 2. Click on the First Time User? Click Here link in the Sign In box. You will see the New Member Sign Up page. 3. Enter your Tyto Access Code exactly as it appears below. You will not need to use this code after youve completed the sign-up process. If you do not sign up before the expiration date, you must request a new code. · Tyto Access Code: [email or web address] Expires: 11/7/2017  7:12 AM 
 
 4. Enter the last four digits of your Social Security Number (xxxx) and Date of Birth (mm/dd/yyyy) as indicated and click Submit. You will be taken to the next sign-up page. 5. Create a Concert Window ID. This will be your Concert Window login ID and cannot be changed, so think of one that is secure and easy to remember. 6. Create a Concert Window password. You can change your password at any time. 7. Enter your Password Reset Question and Answer. This can be used at a later time if you forget your password. 8. Enter your e-mail address. You will receive e-mail notification when new information is available in [address] 9. Click Sign Up. You can now view and download portions of your medical record. 10. Click the Download Summary menu link to download a portable copy of your medical information. If you have questions, please visit the Frequently Asked Questions section of the Concert Window website. Remember, Concert Window is NOT to be used for urgent needs. For medical emergencies, dial 911. Now available from your iPhone and Android! Please provide this summary of care documentation to your next provider. Your primary care clinician is listed as Susan Yin. If you have any questions after today's visit, please call [phone].

## 2017-09-06 NOTE — PATIENT INSTRUCTIONS
Joint Injections: Care Instructions  Your Care Instructions  Joint injections are shots into a joint, such as the knee. They may be used to put in medicines, such as pain relievers. Or they can be used to take out fluid. Sometimes the fluid is tested in a lab. This can help find the cause of a joint problem. A corticosteroid, or steroid, shot is used to reduce inflammation in tendons or joints. It is often used to treat problems such as arthritis, tendinitis, and bursitis. Steroids can be injected directly into a painful, inflamed joint. They can also help reduce inflammation of a bursa. A bursa is a sac of fluid. It cushions and lubricates areas where tendons, ligaments, skin, muscles, or bones rub against each other. A steroid shot can sometimes help with short-term pain relief when other treatments haven't worked. If steroid shots help, pain may improve for weeks or months. Follow-up care is a key part of your treatment and safety. Be sure to make and go to all appointments, and call your doctor if you are having problems. It's also a good idea to know your test results and keep a list of the medicines you take. How can you care for yourself at home? · Put ice or a cold pack on the area for 10 to 20 minutes at a time. Put a thin cloth between the ice and your skin. · Take anti-inflammatory medicines to reduce pain, swelling, or inflammation. These include ibuprofen (Advil, Motrin) and naproxen (Aleve). Read and follow all instructions on the label. · Avoid strenuous activities for several days, especially those that put stress on the area where you got the shot. · If you have dressings over the area, keep them clean and dry. You may remove them when your doctor tells you to. When should you call for help? Call your doctor now or seek immediate medical care if:  · You have signs of infection, such as:  ¨ Increased pain, swelling, warmth, or redness. ¨ Red streaks leading from the site.   ¨ Pus draining from the site. ¨ A fever. Watch closely for changes in your health, and be sure to contact your doctor if you have any problems. Where can you learn more? Go to http://zuleima-randa.info/. Enter N616 in the search box to learn more about \"Joint Injections: Care Instructions. \"  Current as of: March 21, 2017  Content Version: 11.3  © 8520-0722 [employer]. Care instructions adapted under license by [employer] (which disclaims liability or warranty for this information). If you have questions about a medical condition or this instruction, always ask your healthcare professional. [place] [address] any warranty or liability for your use of this information. Knee Arthritis: Exercises  Your Care Instructions  Here are some examples of exercises for knee arthritis. Start each exercise slowly. Ease off the exercise if you start to have pain. Your doctor or physical therapist will tell you when you can start these exercises and which ones will work best for you. How to do the exercises  Knee flexion with heel slide    1. Lie on your back with your knees bent. 2. Slide your heel back by bending your affected knee as far as you can. Then hook your other foot around your ankle to help pull your heel even farther back. 3. Hold for about 6 seconds, then rest for up to 10 seconds. 4. Repeat 8 to 12 times. 5. Switch legs and repeat steps 1 through 4, even if only one knee is sore. Quad sets    1. Sit with your affected leg straight and supported on the floor or a firm bed. Place a small, rolled-up towel under your knee. Your other leg should be bent, with that foot flat on the floor. 2. Tighten the thigh muscles of your affected leg by pressing the back of your knee down into the towel. 3. Hold for about 6 seconds, then rest for up to 10 seconds. 4. Repeat 8 to 12 times.   5. Switch legs and repeat steps 1 through 4, even if only one knee is sore.  Straight-leg raises to the front    1. Lie on your back with your good knee bent so that your foot rests flat on the floor. Your affected leg should be straight. Make sure that your low back has a normal curve. You should be able to slip your hand in between the floor and the small of your back, with your palm touching the floor and your back touching the back of your hand. 2. Tighten the thigh muscles in your affected leg by pressing the back of your knee flat down to the floor. Hold your knee straight. 3. Keeping the thigh muscles tight and your leg straight, lift your affected leg up so that your heel is about 12 inches off the floor. Hold for about 6 seconds, then lower slowly. 4. Relax for up to 10 seconds between repetitions. 5. Repeat 8 to 12 times. 6. Switch legs and repeat steps 1 through 5, even if only one knee is sore. Active knee flexion    1. Lie on your stomach with your knees straight. If your kneecap is uncomfortable, roll up a washcloth and put it under your leg just above your kneecap. 2. Lift the foot of your affected leg by bending the knee so that you bring the foot up toward your buttock. If this motion hurts, try it without bending your knee quite as far. This may help you avoid any painful motion. 3. Slowly move your leg up and down. 4. Repeat 8 to 12 times. 5. Switch legs and repeat steps 1 through 4, even if only one knee is sore. Quadriceps stretch (facedown)    1. Lie flat on your stomach, and rest your face on the floor. 2. Wrap a towel or belt strap around the lower part of your affected leg. Then use the towel or belt strap to slowly pull your heel toward your buttock until you feel a stretch. 3. Hold for about 15 to 30 seconds, then relax your leg against the towel or belt strap. 4. Repeat 2 to 4 times. 5. Switch legs and repeat steps 1 through 4, even if only one knee is sore.   Stationary exercise bike    If you do not have a stationary exercise bike at home, you can find one to ride at your local health club or community center. 1. Adjust the height of the bike seat so that your knee is slightly bent when your leg is extended downward. If your knee hurts when the pedal reaches the top, you can raise the seat so that your knee does not bend as much. 2. Start slowly. At first, try to do 5 to 10 minutes of cycling with little to no resistance. Then increase your time and the resistance bit by bit until you can do 20 to 30 minutes without pain. 3. If you start to have pain, rest your knee until your pain gets back to the level that is normal for you. Or cycle for less time or with less effort. Follow-up care is a key part of your treatment and safety. Be sure to make and go to all appointments, and call your doctor if you are having problems. It's also a good idea to know your test results and keep a list of the medicines you take. Where can you learn more? Go to http://zuleima-randa.info/. Enter C159 in the search box to learn more about \"Knee Arthritis: Exercises. \"  Current as of: March 21, 2017  Content Version: 11.3  © 8265-8884 [employer]. Care instructions adapted under license by [employer] (which disclaims liability or warranty for this information). If you have questions about a medical condition or this instruction, always ask your healthcare professional. [place] [address] any warranty or liability for your use of this information. Low Back Pain: Exercises  Your Care Instructions  Here are some examples of typical rehabilitation exercises for your condition. Start each exercise slowly. Ease off the exercise if you start to have pain. Your doctor or physical therapist will tell you when you can start these exercises and which ones will work best for you. How to do the exercises  Press-up    6. Lie on your stomach, supporting your body with your forearms.   7. Press your elbows down into the floor to raise your upper back. As you do this, relax your stomach muscles and allow your back to arch without using your back muscles. As your press up, do not let your hips or pelvis come off the floor. 8. Hold for 15 to 30 seconds, then relax. 9. Repeat 2 to 4 times. Alternate arm and leg (bird dog) exercise    Note: Do this exercise slowly. Try to keep your body straight at all times, and do not let one hip drop lower than the other. 6. Start on the floor, on your hands and knees. 7. Tighten your belly muscles. 8. Raise one leg off the floor, and hold it straight out behind you. Be careful not to let your hip drop down, because that will twist your trunk. 9. Hold for about 6 seconds, then lower your leg and switch to the other leg. 10. Repeat 8 to 12 times on each leg. 11. Over time, work up to holding for 10 to 30 seconds each time. 12. If you feel stable and secure with your leg raised, try raising the opposite arm straight out in front of you at the same time. Knee-to-chest exercise    7. Lie on your back with your knees bent and your feet flat on the floor. 8. Bring one knee to your chest, keeping the other foot flat on the floor (or keeping the other leg straight, whichever feels better on your lower back). 9. Keep your lower back pressed to the floor. Hold for at least 15 to 30 seconds. 10. Relax, and lower the knee to the starting position. 11. Repeat with the other leg. Repeat 2 to 4 times with each leg. 12. To get more stretch, put your other leg flat on the floor while pulling your knee to your chest.  Curl-ups    6. Lie on the floor on your back with your knees bent at a 90-degree angle. Your feet should be flat on the floor, about 12 inches from your buttocks. 7. Cross your arms over your chest. If this bothers your neck, try putting your hands behind your neck (not your head), with your elbows spread apart.   8. Slowly tighten your belly muscles and raise your shoulder blades off the floor.  9. Keep your head in line with your body, and do not press your chin to your chest.  10. Hold this position for 1 or 2 seconds, then slowly lower yourself back down to the floor. 11. Repeat 8 to 12 times. Pelvic tilt exercise    6. Lie on your back with your knees bent. 7. \"Brace\" your stomach. This means to tighten your muscles by pulling in and imagining your belly button moving toward your spine. You should feel like your back is pressing to the floor and your hips and pelvis are rocking back. 8. Hold for about 6 seconds while you breathe smoothly. 9. Repeat 8 to 12 times. Heel dig bridging    4. Lie on your back with both knees bent and your ankles bent so that only your heels are digging into the floor. Your knees should be bent about 90 degrees. 5. Then push your heels into the floor, squeeze your buttocks, and lift your hips off the floor until your shoulders, hips, and knees are all in a straight line. 6. Hold for about 6 seconds as you continue to breathe normally, and then slowly lower your hips back down to the floor and rest for up to 10 seconds. 7. Do 8 to 12 repetitions. Hamstring stretch in doorway    1. Lie on your back in a doorway, with one leg through the open door. 2. Slide your leg up the wall to straighten your knee. You should feel a gentle stretch down the back of your leg. 3. Hold the stretch for at least 15 to 30 seconds. Do not arch your back, point your toes, or bend either knee. Keep one heel touching the floor and the other heel touching the wall. 4. Repeat with your other leg. 5. Do 2 to 4 times for each leg. Hip flexor stretch    1. Kneel on the floor with one knee bent and one leg behind you. Place your forward knee over your foot. Keep your other knee touching the floor. 2. Slowly push your hips forward until you feel a stretch in the upper thigh of your rear leg. 3. Hold the stretch for at least 15 to 30 seconds. Repeat with your other leg.   4. Do 2 to 4 times on each side. Wall sit    1. Stand with your back 10 to 12 inches away from a wall. 2. Lean into the wall until your back is flat against it. 3. Slowly slide down until your knees are slightly bent, pressing your lower back into the wall. 4. Hold for about 6 seconds, then slide back up the wall. 5. Repeat 8 to 12 times. Follow-up care is a key part of your treatment and safety. Be sure to make and go to all appointments, and call your doctor if you are having problems. It's also a good idea to know your test results and keep a list of the medicines you take. Where can you learn more? Go to http://zuleima-randa.info/. Enter J830 in the search box to learn more about \"Low Back Pain: Exercises. \"  Current as of: March 21, 2017  Content Version: 11.3  © 7217-7774 "[employer]. Care instructions adapted under license by [employer] (which disclaims liability or warranty for this information). If you have questions about a medical condition or this instruction, always ask your healthcare professional. [address] any warranty or liability for your use of this information.

## 2017-09-06 NOTE — PROGRESS NOTES
[address] [facility]  Sports Medicine Office Visit    Azul Herrera is a 74 y.o. male (: 1950) presenting to address:  Chief Complaint   Patient presents with    Knee Pain     right knee discomfort        PCP: Ted Pace MD  Referring provider: Ted Pace MD    Assessment/Plan:       ICD-10-CM   1. Primary osteoarthritis of right knee - ongoing, slightly worse recently with increased walking for exercise  - reminded re: HEP for quad and glute strengthening  - tolerated steroid injection well today  - reminded re: ice PRN  - f/u PRN     M17.11   2. DDD (degenerative disc disease), lumbosacral - ongoing, chronic, mild-mod  - advised HEP for hamstring flexibility     M51.37   3. Encounter for immunization Z23       Orders Placed This Encounter    Influenza virus vaccine ([address]) 72 years and older ([address])   [address] fee () for Medicare insured patients    TRIAMCINOLONE ACETONIDE INJ     Order Specific Question:   Charge Quantity? Answer:   4     Order Specific Question:   Dose     Answer:   40 mg     Order Specific Question:   Site     Answer:   RIGHT KNEE     Order Specific Question:   Expiration Date     Answer:   2018     Order Specific Question:   Lot#     Answer:   [id number]     Order Specific Question:        Answer:   [employer]     Order Specific Question:   Perfomed by/Witnessed by: Answer:   Lorena Moreno LPN     Order Specific Question:   NDC#     Answer:   6193-4804-64    47893 - DRAIN/INJECT LARGE JOINT/BURSA    triamcinolone acetonide (KENALOG) 40 mg/mL injection     Si mL by IntraMUSCular route once for 1 dose. Dispense:  1 mL     Refill:  0           Spent 25min face-to-face, >50% spent on counseling & patient education. Overdue HM items:   Mr. Darlene Bangura has a reminder for a \"due or due soon\" health maintenance.  I have asked that he contact his primary care provider for follow-up on this health maintenance. Management plan & patient instructions discussed with Kendrick Quijano, who voiced understanding. Thank you for the opportunity to participate in the care of this patient. If any questions or concerns at all, please feel free to contact me. This document may have been created with the aid of dictation software. Text may contain errors, particularly phonetic errors. Joan Lazaro MD  Internal Medicine, Family Medicine & Sports Medicine  9/6/2017, 10:03 AM    Patient Instructions (provided in AVS):     Joint Injections: Care Instructions  Knee Arthritis: Exercises  Low Back Pain: Exercises    History:   Kendrick Quijano is a 74 y.o. male who presents to address:  Chief Complaint   Patient presents with    Knee Pain     right knee discomfort          [place] 3mi at a time, started 2 months ago. Recurrent R knee pain since walking, worse with WB, better with rest.  + theater sign  Reports trace swelling  No instability    Also having some low back pain, which does not radiate. History of AAA repair, and left rotator cuff tear. Past Medical History:   Diagnosis Date    Abrasion of skin 1/25/2016    Acid reflux     ACP (advance care planning)     has not yet filled out ACP, discussed 4/1/16    Aneurysm ([address]     Aortic root dilation ([address]     Borderline on echo June 8 2015 (done through MTF)    Arthritis     Avascular necrosis of bones of both hips ([address] 4/19/16    MRI L-spine revealed bialteral AVN femoral heads (MRI/CT dx, report submitted for scannin)    Avascular necrosis of femoral head ([address] 2/2015    BILATERAL, noted on CT 2/24/15 ([place]) - Avascular necrosis involving both femoral heads. No femoral head collapse identified.     Back pain, lumbosacral     Back pain, thoracic     BMI 33.0-33.9,adult     BMI 33.0-33.9,adult     Bone lesion left femur     Asymptomatic, MRI planned for October    Carpal tunnel syndrome, bilateral     Cervical spondylosis     C4-C6    Chronic pain     Compression fracture of thoracic vertebra (HCC) 1/2016    DDD (degenerative disc disease), lumbosacral     Dental disorder     Diabetes ([address]     Elevated transaminase level     AST and ALT     Essential hypertension     GERD (gastroesophageal reflux disease)     Herniated nucleus pulposus     Lumbar diskectomy 1980, C4/5 HNP, has gotten ESIs in the past    History of poliomyelitis     History of shingles 7/2013    Right flank/back, after having vaccine    Hypercholesterolemia     Hypogonadism male     Impingement syndrome of left shoulder 8/2013    Internal hemorrhoids     Long term (current) use of anticoagulants     baby asa    NELSON (obstructive sleep apnea)     Unable to tolerate CPAP    Restless leg syndrome     S/P AAA repair     Dr. Saira Danielle following, q3 year duplex of aorta    Seasonal allergic reaction     Type 2 diabetes mellitus, controlled ([address]      Past Surgical History:   Procedure Laterality Date    HX LUMBAR DISKECTOMY N/A 1980    HX ORTHOPAEDIC      left shoulder    VASCULAR SURGERY PROCEDURE UNLIST N/A     AAA repair with stent, [facility]      reports that he has quit smoking. His smoking use included Cigarettes and Pipe. He has quit using smokeless tobacco. He reports that he drinks alcohol. He reports that he does not use illicit drugs.   Family History   Problem Relation Age of Onset   [address] Cancer Mother      Rectal, metastasized late 76s    Heart Disease Father     Cancer Brother      Metastatic, not sure of primary    Cancer Maternal Uncle     Cancer Maternal Aunt      Allergies   Allergen Reactions    Zocor [Simvastatin] Myalgia     Tolerating high dose Atorvastatin       Problem List:      Patient Active Problem List    Diagnosis    Hypoglycemia    Nasal sinus congestion    Diabetic polyneuropathy associated with type 2 diabetes mellitus ([address]    Thoracic compression fracture ([address] Dr. Javid Lei Back pain, thoracic    Back pain, lumbosacral    Bone lesion left femur     Asymptomatic, MRI planned for October      DDD (degenerative disc disease), lumbosacral    Avascular necrosis of bones of both hips ([address]     MRI L-spine revealed bialteral AVN femoral heads (MRI/CT dx, report submitted for scannin)      ACP (advance care planning)     has not yet filled out ACP, discussed 4/1/16      Bilateral arm pain    Bilateral low back pain without sciatica    Fall through floor    Compression fracture of thoracic vertebra ([address]    Aortic root dilation ([address]     Borderline on echo June 8 2015 (done through [facility])      Avascular necrosis of femoral head (HCC)     BILATERAL, noted on CT 2/24/15 ([place]) - Avascular necrosis involving both femoral heads. No femoral head collapse identified.  Tinnitus of both ears    Low serum testosterone level     3/21/14:  Serum testosterone 179 (L), free testosterone 5.1 (L)  -- Referring to urology to discuss prostate screening and options for repletion      Brachial neuritis    Spinal stenosis in cervical region    Decreased libido     3/19/2014:  Lack of interest recently  -- Check testosterone levels, doubt this has anything to do with hx of aortic aneurysm surgery by Dr. Watson Keep Sinus congestion     3/19/2014:  Seasonal allergies  -- Resume Nasonex (advised he needs to use this consistently to see a benefit)  -- If no improvement add Allegra-D daily, patient to watch BP on home monitor if he does take this      Cervical spondylosis    Decreased GFR     3/8/2014:  Cr 1.31, eGFR 57  -- Improved on repeat labs with eGFR of 78      Routine adult health maintenance     -- Colon cancer:    Colonoscopy done 11/29/2005 at [facility]  -- Vaccinations:    Influenza vaccine given 2013 at [facility]   Pneumococcal vaccine    Tdap refused by patient at [facility] 2012   Herpes Zoster vaccine given 2012 at [facility]  -- Weight:  Discussed the patient's BMI with him.   The BMI follow up plan is as follows  -- Prostate cancer:     Discuss re: screening with PSA between ages 48 and 71      Pulmonary nodule, left     Single 0.5 cm posterior lateral aspect of left lower lobe pleural-based pulmonary nodule; Incidental finding, stable from 11/2008 CT to 6/1/2010 CT      Osteoarthritis of right knee     Xrays ([facility]) R knee - 11/12/13 -- Mild medial compartment DJD  -- s/p steroid injection Nov 2013 [Dr. Shukri Frey which resolved knee pain after about a week      Degenerative disc disease     Cervical and lumbar, managed by Dr. Loren Castillo (pain mgmt, [facility])  -- C4-C6 cervical spondylosis  5/9/2012 -- s/p L4-L5 TIGRE done for right sided sciatica   2/6/2012 -- s/p SEBASTIEN at C5-C6 for cervicalgia, cervical radiculopathy, noted possible left RCT at that time  10/6/2011 -- MRI L-spine with/wo contrast -- DDD at L5-S1 with marked disc space height loss and type 2 discogenic marrow change, left laminotomy and minimal scar tisse.   Right L5 lateral recess extrusion or subtly inferiorly migrated free fragment at L4-5.  3/6/2014:  MRI C-spine to eval worsening neck/shoulder pain L > R (patient states previous pain mgmt doc told him he would need surgery if pain returned)  3/19/2014:    -- MRI confirms multilevel disease with worst disease at C5  -- Patient to notify office of name of neurosurgeon he would like to be referred to     [address] [place] [address] Hypogonadism male     10/6/2010 -- Testosterone 170.0      NELSON (obstructive sleep apnea)     Unable to tolerate CPAP  8/28/12 -- CXR PA&L for chronic dyspnea negative (DJD of spine noted)      Essential hypertension     3/6/2014:  /82  Pulse 51  Wt 259 lb 6.4 oz (117.663 kg)  BMI 32.42 kg/m2   -- Continue Micardis 40-12.5 mg daily        BMI 33.0-33.9,adult    Restless leg syndrome    Non-insulin dependent type 2 diabetes mellitus ([facility]     Goal HbA1C:  <7.0   3/8/2014:  HbA1C 6.6 (H) at goal   9/3/2013 -- HbA1C was 6.9 (H)   8/20/2012 -- HbA1C was 6.6  Goal BP: <130/80   3/6/2014:  132/82  Diabetic medications:   Metformin 500 mg PO BID-AC  Screening for proteinuria:   4/8/2013 -- Microalb/Creat ratio 5  ACE/ARB therapy prescribed:   Telmisartan/HCTZ 40-12.5 mg PO daily    Statin therapy prescribed:   Atorvastatin 80 mg PO QHS   Daily aspirin therapy prescribed:   81 mg daily   Screening for low Vit B12 on longterm metformin therapy:   3/8/2014:  Vit B12 level WNL (707)  Screening for diabetic retinopathy   3/18/2014:  Dilated eye exam done by Dr. Chacho Shearer of [facility] ([phone]  Screening for diabetic neuropathy   3/6/2014:  Diabetic foot exam today WNL BL          Chronic pain    Hypercholesterolemia     9/3/13 -- Total chol 140,  (H), HDL 35 (L), LDL 79.2, VLDL 25.8, chol/HDL chol 4.04  9/3/13 -- AST 36, ALT 36, Alk Phos 96, prot 7.6, alb 4.4  4/8/13 -- TSH 2.080  3/6/2014: Total chol 152, , HDL 40, LDL-c 88, VLDL-c 24  -- Continue taking atorvastatin 80 mg PO QHS      Arthritis     Shoulders L > R, knees, back      Acid reflux    S/P AAA repair     Surgery in 2009 by Dr. Scales [facility])  -- Informed by [facility] that he doesn't need follow up for several years 1/2014 2/7/2013 -- CTA done for mild enlargement noted on US 1/13:  Intact aortic aneurysm repair, dilatation of common iliac arteries with small dissections or ulcerations, infarction of lower pole of right kidney; sinus of valsalva 4 cm, sinotubular jcn 3.2 cm, midascending aorta 2.4 cm, aortic arch 2.4 cm, mid desc aorta 3.3 cm, diaphragmatic hiatus 2.8 cm, celiac trunk 2.8 cm, SMA 2.8 cm, superior extent of repair 2.8 cm, both left and right common iliacs dilated to 1.8 cm.  8/24/2011 -- Abd/Retroperitoneal US  -- Limited eval of abd aorta due to overlying bowel gas. Echogenic sludge in gallbladder, no stones, no cholecystitis, hepatic steatosis. Proximal aorta not visualized. Mid abd ao 2.5 x 2.5 cm, distal portion 2.3 x 2.2 cm, R iliac 1.1 x 1.1 cm and left iliac 1 x 1.1 cm.  Normal cardiac stress test     9/18/2012 -- Normal Lexiscan cardiac stress and rest perfusion study, EF 62% (read by Dr. Ozzie Lopez, [facility]), Astrid Robles  9/21/2012 -- Holter monitor -- Ltanya Ba 44%, tachy 0%, no arrhythmias detected, some premature atrial and ventricular beats  6/17/2010 -- Normal Lexiscan cardiac stress and rest perfusion study, EF 55% (read by Dr. Roney Luther) MONICA Astrid Wilson         Medications:     Current Outpatient Prescriptions on File Prior to Visit   Medication Sig Dispense Refill    omeprazole (PRILOSEC) 20 mg capsule Take 1 Cap by mouth daily. 90 Cap 0    Azelastine (ASTEPRO) 0.15 % (205.5 mcg) nasal spray two (2) times a day.  glipiZIDE (GLUCOTROL) 10 mg tablet Take 1 Tab by mouth two (2) times a day. 180 Tab 3    ALPRAZolam (XANAX) 0.25 mg tablet Take 1 Tab by mouth two (2) times daily as needed for Anxiety. Max Daily Amount: 0.5 mg. Indications: anxiety 90 Tab 1    naproxen (NAPROSYN) 500 mg tablet Take 1 Tab by mouth two (2) times daily (with meals). Take 1 Tab by Mouth Twice Daily. 180 Tab 1    glucose blood VI test strips (FREESTYLE LITE STRIPS) strip ICD10 E11.65 - DM2 daily BG testing. 100 Strip 3    OTHER Heel cup  Indications: heel pain 1 Each 0    mometasone (NASONEX) 50 mcg/actuation nasal spray 2 Sprays by Both Nostrils route daily. 3 Container 3    telmisartan-hydrochlorothiazide (MICARDIS HCT) 40-12.5 mg per tablet Take 1 Tab by mouth daily. 90 Tab 3    metFORMIN ER (GLUCOPHAGE XR) 750 mg tablet Take 1 Tab by mouth two (2) times a day. 180 Tab 3    pramipexole (MIRAPEX) 0.5 mg tablet Take 1 Tab by mouth three (3) times daily as needed. Indications: RESTLESS LEGS SYNDROME 270 Tab 3    atorvastatin (LIPITOR) 80 mg tablet Take 1 Tab by mouth daily. Take 80 mg by Mouth Once a Day. 90 Tab 3    Lancets misc Uncontrolled DM2. BID testing. Disp 2 boxes of 100 each with 3 refills for 90 day supply. 2 Package 3    Diabetic Supplies, Miscellan.  (LANCING DEVICE WITH LANCETS) misc DM2 uncontrolled - BG testing BID. 1 Each 0    Blood-Glucose Meter misc BG meter for monitoring BG - ICD 9 250.00 - Diabetes mellitus type 2 uncontrolled - BID testing. 1 Each 0    olopatadine (PATANOL) 0.1 % ophthalmic solution Administer 2 Drops to both eyes two (2) times daily as needed for Allergies. 15 mL 3    triamcinolone (ARISTOCORT) 0.5 % topical cream Apply  to affected area two (2) times a day. use thin layer to affected area 15 g 0    LEVITRA 10 mg tablet Take 10 mg by mouth as needed. 90 tablet 0    aspirin 81 mg chewable tablet Take 81 mg by Mouth Once a Day.  omega-3 fatty acids-vitamin e (FISH OIL) 1,000 mg cap Take 1 Cap by mouth two (2) times a day. 180 Cap 3    meloxicam (MOBIC) 7.5 mg tablet Take 1 Tab by mouth daily as needed for Pain (arthritis). 90 Tab 1    carboxymethylcellulose sodium 1 % DLGl Apply  to eye.  oxyCODONE-acetaminophen (PERCOCET) 5-325 mg per tablet Take 1 Tab by mouth every six (6) hours as needed for Pain. Max Daily Amount: 4 Tabs. 20 Tab 0    glucose blood VI test strips (BLOOD GLUCOSE TEST) strip Uncontrolled DM2. BID testing. 1 Each 11    fexofenadine-pseudoephedrine (ALLEGRA-D 24 HOUR) 180-240 mg per tablet Take 1 Tab by mouth daily. 90 Tab 1     No current facility-administered medications on file prior to visit. Review of Systems:     Review of Systems   Musculoskeletal: Positive for joint pain. Skin: Negative for rash. Neurological: Negative for tingling, tremors, sensory change and focal weakness. Physical Assessment:     Vitals:    09/06/17 0954   BP: 139/84   Pulse: (!) 48   Resp: 18   Temp: 97.2 °F (36.2 °C)   TempSrc: Oral   SpO2: 98%   Weight: 262 lb 9.6 oz (119.1 kg)   Height: 6' 3\" (1.905 m)   PainSc:   4   PainLoc: Knee       General:     Well-developed, well-nourished overweight male, pleasant, appropriate and conversant. Head:      No facial asymmetry noted.   Extremities:     No edema, no TTP bilateral calves. LEs warm and well-perfused. Neuro:     Alert and oriented, CNs II-XII intact, no focal deficits. Skin:      No rashes noted. MSK:  + TTP medial/lateral joint lines of R knee with trace effusion  Full AROM B knees  Normal gait  + tight hamstrings  Able to heel walk and toe walk in place      Procedure Note:    Right Knee Corticosteroid Injection    Written informed consent signed after discussing the risks & benefits as well as alternative treatments with Whit Liao.  The risks associated with corticosteroid injection include but are not limited to bleeding, infection, synovitis (flare reaction), tendon rupture, skin color changes, and fat atrophy.     --------------------------Timeout Performed Prior To Procedure --------------------------    Chart reviewed for the following:  Kacy Krueger MD, have reviewed the History, Physical and updated the Allergic reactions for [address] performed immediately prior to start of procedure:  Kacy Krueger MD, have performed the following reviews on Whit Liao prior to the start of the procedure:            * Patient was identified by name and date of birth   * Agreement on procedure being performed was verified  * Risks and Benefits explained to the patient  * Procedure site verified and marked as necessary  * Patient was positioned for comfort  * Consent was signed and verified     Time: 10:18 AM     Date of procedure: 9/6/2017    Procedure performed by:  Sudheer Cadena MD    How tolerated by patient: tolerated the procedure well with no complications     ----------------------------------------------------------------------------------------------------------      Location: righ knee  Skin Prep:  Betadine x 3  Anesthesia: ethyl chloride  Needle used: 22g  Approach: lateral joint line  Position: seated with knee @ 90deg  Medications: 1ml (40mg) of Kenalog 40mg/ml, 1.5ml of 1% lidocaine, and 1.5ml of 0.5% Marcaine    O ml of fluid aspirated. Outcome: Patient tolerated the procedure well with no immediate complications noted. The area was cleaned & dressed. Post-Procedure Pain Level: 1 / right knee    Instructions: Patient instructed to rest the joint and apply ice as needed for 15 minutes every 2-3 hours for the next 24 hours, and to limit activity involving the area treated for the next 48 hours. Also instructed to call if there is increased pain, swelling or if a fever develops.

## 2017-09-27 DIAGNOSIS — E11.9 NON-INSULIN DEPENDENT TYPE 2 DIABETES MELLITUS (HCC): Chronic | ICD-10-CM

## 2017-09-27 DIAGNOSIS — E78.00 HYPERCHOLESTEROLEMIA: Chronic | ICD-10-CM

## 2017-09-27 DIAGNOSIS — G25.81 RESTLESS LEG SYNDROME: Chronic | ICD-10-CM

## 2017-09-27 RX ORDER — ATORVASTATIN CALCIUM 80 MG/1
80 TABLET, FILM COATED ORAL DAILY
Qty: 30 TAB | Refills: 0 | Status: SHIPPED | OUTPATIENT
Start: 2017-09-27 | End: 2017-11-06 | Stop reason: SDUPTHER

## 2017-09-27 RX ORDER — PRAMIPEXOLE DIHYDROCHLORIDE 0.5 MG/1
0.5 TABLET ORAL
Qty: 90 TAB | Refills: 0 | Status: SHIPPED | OUTPATIENT
Start: 2017-09-27 | End: 2017-12-20 | Stop reason: SDUPTHER

## 2017-11-06 DIAGNOSIS — E11.9 NON-INSULIN DEPENDENT TYPE 2 DIABETES MELLITUS (HCC): Chronic | ICD-10-CM

## 2017-11-06 DIAGNOSIS — E78.00 HYPERCHOLESTEROLEMIA: Chronic | ICD-10-CM

## 2017-11-06 RX ORDER — METFORMIN HYDROCHLORIDE 750 MG/1
750 TABLET, EXTENDED RELEASE ORAL 2 TIMES DAILY
Qty: 180 TAB | Refills: 3 | Status: SHIPPED | OUTPATIENT
Start: 2017-11-06 | End: 2019-01-07 | Stop reason: SDUPTHER

## 2017-11-06 RX ORDER — ATORVASTATIN CALCIUM 80 MG/1
80 TABLET, FILM COATED ORAL DAILY
Qty: 90 TAB | Refills: 3 | Status: SHIPPED | OUTPATIENT
Start: 2017-11-06 | End: 2019-04-16 | Stop reason: SDUPTHER

## 2017-11-06 NOTE — TELEPHONE ENCOUNTER
Pt is requesting 90 day supply.     Please let me know once med is sent so I can notify pt, thank you

## 2017-12-18 ENCOUNTER — PATIENT OUTREACH (OUTPATIENT)
Dept: FAMILY MEDICINE CLINIC | Age: 67
End: 2017-12-18

## 2017-12-18 NOTE — PROGRESS NOTES
[facility] Follow Up [facility] Admission from 12/14-15/2017   for  Symptomatic Bradycardia. RRAT score: 12 Medium    Medical History:     Past Medical History:   Diagnosis Date    Abrasion of skin 1/25/2016    Acid reflux     ACP (advance care planning)     has not yet filled out ACP, discussed 4/1/16    Aneurysm ([address]     Aortic root dilation (HCC)     Borderline on echo June 8 2015 (done through [facility])    Arthritis     Avascular necrosis of bones of both hips ([address] 4/19/16    MRI L-spine revealed bialteral AVN femoral heads (MRI/CT dx, report submitted for scannin)    Avascular necrosis of femoral head ([address] 2/2015    BILATERAL, noted on CT 2/24/15 ([place]) - Avascular necrosis involving both femoral heads. No femoral head collapse identified.     Back pain, lumbosacral     Back pain, thoracic     BMI 33.0-33.9,adult     BMI 33.0-33.9,adult     Bone lesion left femur     Asymptomatic, MRI planned for October    Carpal tunnel syndrome, bilateral     Cervical spondylosis     C4-C6    Chronic pain     Compression fracture of thoracic vertebra ([address] 1/2016    DDD (degenerative disc disease), lumbosacral     Dental disorder     Diabetes ([address]     Elevated transaminase level     AST and ALT     Essential hypertension     GERD (gastroesophageal reflux disease)     Herniated nucleus pulposus     Lumbar diskectomy 1980, C4/5 HNP, has gotten ESIs in the past    History of poliomyelitis     History of shingles 7/2013    Right flank/back, after having vaccine    Hypercholesterolemia     Hypogonadism male     Impingement syndrome of left shoulder 8/2013    Internal hemorrhoids     Long term (current) use of anticoagulants     baby asa    NELSON (obstructive sleep apnea)     Unable to tolerate CPAP    Restless leg syndrome     S/P AAA repair     Dr. Nayeli Viera following, q3 year duplex of aorta    Seasonal allergic reaction     Type 2 diabetes mellitus, controlled ([address]           This represents Transitions of Care b/c NN spoke with patient and/or caregiver within 1 business days of discharge. Pt's TCM follow up appt is scheduled with Dr. Maggy Mares on Wednesday 12/21/2017  @ 4:30 pm which is within 5 days of discharge.  Called patient on 12/18 and verified with 2 identifiers. Patient went through his admission to d/c. He had his Thallium stress test today . Said that the results will be available tomorrow for Dr Maggy Mares to review. He has not had anymore dizziness,light head or faint feeling. Denies chest pains or SOB. Asked about his blood sugar readings ,state's they are find I have no problems with my diabetes. Patient thanked NN for the call in getting him in for the early appointment. Then he preceded to look forward to seeing Dr Maggy Mares since he had not seen her since having her baby.     Patient presenting symptoms dizzy, light headed , had to hold on to things to keep his balance  Diagnosed with Symptomatic bradycardia     Admitted to Hospitalist Service, cardiology consulted. Course of current Hospitalization (referenced by Norm Patel,    note): Hospital Course:  Per H&P:  \"76 years old pleasant male with htn, dm, GERD, nasal congestion comes with chest discomfort on left all day, was hunting all last week, denies dizziness, no radiation of the pain, no palpitations no fever chills, h/o AAA repair; no cad  Admitted for further eval\"  Labs wnl. TTE normal. CE's NEG X2. Asymptomatic after admit. Further workup outpt per cardiology.  Pt requests DC.    1 Symptomatic bradycardia   -Presented with cp diaphoresis which resolved  -HR in early 45s, received atropine with improvement in symptoms, EKG showed sinus adilene, in tele it showed junctional rhythm and now converted back to sinus  -Serial enzymes wnl, tsh wnl, TTE normal with normal EF, cardio consult ordered NST for Monday outpt  -continue asa  -CTA unremarkable - has h/o AAA repair; currently cp is gone  -States his HR typically runs low since he was a child  -Pt wishes for DC to go hunting; counseled on dangers of symptomatic bradycardia with exertion      Medication Reconciliation completed: yes      . Goals        Post Hospitalization     Returns to baseline activity level.  Understands red flags post discharge. No the Signs and Symptoms of  \"Symptomatic Bradycardia\":  Slow heart rate /Pulse below 50 ,dizziness . Some causes electrolyte imbalance/dehydration Drink fluids ,when hunting or activities.    Know your baseline heart rate

## 2017-12-18 NOTE — Clinical Note
Patient has an AL f/u 12/20/2017 Symptomatic Bradycardia.  Has Thallium Stress test 12/18/2017 At [facility]

## 2017-12-19 PROBLEM — R07.9 CHEST PAIN: Status: ACTIVE | Noted: 2017-12-15

## 2017-12-19 PROBLEM — R00.1 SYMPTOMATIC BRADYCARDIA: Status: ACTIVE | Noted: 2017-12-15

## 2017-12-19 PROBLEM — R09.81 NASAL SINUS CONGESTION: Chronic | Status: ACTIVE | Noted: 2017-05-03

## 2017-12-19 PROBLEM — E16.2 HYPOGLYCEMIA: Chronic | Status: RESOLVED | Noted: 2017-05-03 | Resolved: 2017-12-19

## 2017-12-20 ENCOUNTER — OFFICE VISIT (OUTPATIENT)
Dept: FAMILY MEDICINE CLINIC | Age: 67
End: 2017-12-20

## 2017-12-20 ENCOUNTER — TELEPHONE (OUTPATIENT)
Dept: FAMILY MEDICINE CLINIC | Age: 67
End: 2017-12-20

## 2017-12-20 VITALS
TEMPERATURE: 97.9 F | OXYGEN SATURATION: 97 % | WEIGHT: 256 LBS | RESPIRATION RATE: 20 BRPM | SYSTOLIC BLOOD PRESSURE: 134 MMHG | BODY MASS INDEX: 31.83 KG/M2 | HEART RATE: 52 BPM | DIASTOLIC BLOOD PRESSURE: 84 MMHG | HEIGHT: 75 IN

## 2017-12-20 DIAGNOSIS — I10 ESSENTIAL HYPERTENSION: Chronic | ICD-10-CM

## 2017-12-20 DIAGNOSIS — E11.42 TYPE 2 DIABETES MELLITUS WITH DIABETIC POLYNEUROPATHY, WITHOUT LONG-TERM CURRENT USE OF INSULIN (HCC): Chronic | ICD-10-CM

## 2017-12-20 DIAGNOSIS — R94.4 DECREASED GFR: ICD-10-CM

## 2017-12-20 DIAGNOSIS — R94.39 ABNORMAL NUCLEAR STRESS TEST: Primary | ICD-10-CM

## 2017-12-20 DIAGNOSIS — R00.1 SYMPTOMATIC BRADYCARDIA: ICD-10-CM

## 2017-12-20 DIAGNOSIS — S22.000D CLOSED COMPRESSION FRACTURE OF THORACIC VERTEBRA WITH ROUTINE HEALING, SUBSEQUENT ENCOUNTER: Chronic | ICD-10-CM

## 2017-12-20 DIAGNOSIS — G25.81 RESTLESS LEG SYNDROME: Chronic | ICD-10-CM

## 2017-12-20 DIAGNOSIS — R07.9 CHEST PAIN, UNSPECIFIED TYPE: ICD-10-CM

## 2017-12-20 DIAGNOSIS — Z86.79 S/P AAA REPAIR: Chronic | ICD-10-CM

## 2017-12-20 DIAGNOSIS — G47.33 OSA (OBSTRUCTIVE SLEEP APNEA): Chronic | ICD-10-CM

## 2017-12-20 DIAGNOSIS — E78.00 HYPERCHOLESTEROLEMIA: Chronic | ICD-10-CM

## 2017-12-20 DIAGNOSIS — L72.9 CYST OF SKIN: ICD-10-CM

## 2017-12-20 DIAGNOSIS — M53.9 MULTILEVEL DEGENERATIVE DISC DISEASE: Chronic | ICD-10-CM

## 2017-12-20 DIAGNOSIS — Z98.890 S/P AAA REPAIR: Chronic | ICD-10-CM

## 2017-12-20 DIAGNOSIS — J30.1 CHRONIC SEASONAL ALLERGIC RHINITIS DUE TO POLLEN: Chronic | ICD-10-CM

## 2017-12-20 DIAGNOSIS — K21.9 GASTROESOPHAGEAL REFLUX DISEASE WITHOUT ESOPHAGITIS: ICD-10-CM

## 2017-12-20 DIAGNOSIS — Z00.00 ROUTINE ADULT HEALTH MAINTENANCE: ICD-10-CM

## 2017-12-20 RX ORDER — PRAMIPEXOLE DIHYDROCHLORIDE 0.5 MG/1
0.5 TABLET ORAL
Qty: 270 TAB | Refills: 3 | Status: SHIPPED | OUTPATIENT
Start: 2017-12-20 | End: 2019-10-01

## 2017-12-20 RX ORDER — BLOOD-GLUCOSE METER
EACH MISCELLANEOUS
Qty: 1 EACH | Refills: 0 | Status: SHIPPED | OUTPATIENT
Start: 2017-12-20

## 2017-12-20 RX ORDER — OMEPRAZOLE 20 MG/1
20 CAPSULE, DELAYED RELEASE ORAL DAILY
Qty: 90 CAP | Refills: 3 | Status: SHIPPED | OUTPATIENT
Start: 2017-12-20 | End: 2019-10-28

## 2017-12-20 RX ORDER — LANCETS
EACH MISCELLANEOUS
Qty: 2 PACKAGE | Refills: 3 | Status: SHIPPED | OUTPATIENT
Start: 2017-12-20

## 2017-12-20 NOTE — TELEPHONE ENCOUNTER
FYI to nurse about referral:  Referral was placed to Cardiology (Dr Aravind Nagy).  Call Dr Aravind Nagy (Cardiologist) to review stress test if its ok for pt to wait for his wife to be stateside, until 01/10/17 or needs to be seen as soon as possible

## 2017-12-20 NOTE — PROGRESS NOTES
Cayden Correa is a 69 y.o. male (: 1950) presenting to address:    Chief Complaint   Patient presents with   [facility] Follow Up     [facility] 12/15/17-17 for symptomatic bradycardia       Vitals:    17 1547   BP: 164/83   Pulse: (!) 52   Resp: 20   Temp: 97.9 °F (36.6 °C)   TempSrc: Oral   SpO2: 97%   Weight: 256 lb (116.1 kg)   Height: 6' 3\" (1.905 m)   PainSc:   0 - No pain       Hearing/Vision:   No exam data present    Learning Assessment:     Learning Assessment 2015   PRIMARY LEARNER Patient   HIGHEST LEVEL OF EDUCATION - PRIMARY LEARNER  2 YEARS OF COLLEGE   BARRIERS PRIMARY LEARNER NONE   CO-LEARNER CAREGIVER No   PRIMARY LANGUAGE ENGLISH   LEARNER PREFERENCE PRIMARY DEMONSTRATION   ANSWERED BY Ashkan Rayo   RELATIONSHIP SELF     Depression Screening:     PHQ over the last two weeks 2017   Little interest or pleasure in doing things Not at all   Feeling down, depressed or hopeless Not at all   Total Score PHQ 2 0     Fall Risk Assessment:     Fall Risk Assessment, last 12 mths 2017   Able to walk? Yes   Fall in past 12 months? No   Fall with injury? -   Number of falls in past 12 months -   Fall Risk Score -     Abuse Screening:     Abuse Screening Questionnaire 2017   Do you ever feel afraid of your partner? N   Are you in a relationship with someone who physically or mentally threatens you? N   Is it safe for you to go home? Y     Coordination of Care Questionaire:   1. Have you been to the ER, urgent care clinic since your last visit? Hospitalized since your last visit? YES    2. Have you seen or consulted any other health care providers outside of the [address] [place] [address] since your last visit? Include any pap smears or colon screening. NO    Advanced Directive:   1. Do you have an Advanced Directive? NO    2. Would you like information on Advanced Directives?  NO

## 2017-12-20 NOTE — PATIENT INSTRUCTIONS
SERGO REST/STRESS MULTIPLE SPECT - 12/18/2017  [facility]  Component Name Value Ref Range   EF Nuc 61     Result Impression    THIS MYOCARDIAL PERFUSION STUDY IS ABNORMAL   THIS IS A MEDIUM RISK STUDY   There is small to medium size area of mild to moderate reversible perfusion defect in the mid and basal Inferior wall segments   consistent with ischemia. NO INFARCTION. THE CALCULATED LVEF WAS 61% NORMAL WALL MOTION. NO PRIOR STUDY FOR COMPARISON. Incidental event. Upper incision of IV patient had a transient unresponsiveness with seizure-like activity and noted to be hypotensive, bradycardic, suggestive of vasovagal episode responded to IV fluid administration. No visits with results within 2 Week(s) from this visit. Latest known visit with results is:    Hospital Outpatient Visit on 07/27/2017   Component Date Value Ref Range Status    Hemoglobin A1c 07/27/2017 7.2* 4.2 - 5.6 % Final    Comment: (NOTE)  HbA1C Interpretive Ranges  <5.7              Normal  5.7 - 6.4         Consider Prediabetes  >6.5              Consider Diabetes      Est. average glucose 07/27/2017 160  mg/dL Final    Comment: (NOTE)  The eAG should be interpreted with patient characteristics in mind   since ethnicity, interindividual differences, red cell lifespan,   variation in rates of glycation, etc. may affect the validity of the   calculation.       Sodium 07/27/2017 140  136 - 145 mmol/L Final    Potassium 07/27/2017 4.2  3.5 - 5.5 mmol/L Final    Chloride 07/27/2017 104  100 - 108 mmol/L Final    CO2 07/27/2017 29  21 - 32 mmol/L Final    Anion gap 07/27/2017 7  3.0 - 18 mmol/L Final    Glucose 07/27/2017 122* 74 - 99 mg/dL Final    BUN 07/27/2017 19* 7.0 - 18 MG/DL Final    Creatinine 07/27/2017 1.23  0.6 - 1.3 MG/DL Final    BUN/Creatinine ratio 07/27/2017 15  12 - 20   Final    GFR est AA 07/27/2017 >60  >60 ml/min/1.73m2 Final    GFR est non-AA 07/27/2017 59* >60 ml/min/1.73m2 Final    Comment: (NOTE)  Estimated GFR is calculated using the Modification of Diet in Renal   Disease (MDRD) Study equation, reported for both  Americans   (GFRAA) and non- Americans (GFRNA), and normalized to 1.73m2   body surface area. The physician must decide which value applies to   the patient. The MDRD study equation should only be used in   individuals age 25 or older. It has not been validated for the   following: pregnant women, patients with serious comorbid conditions,   or on certain medications, or persons with extremes of body size,   muscle mass, or nutritional status.  Calcium 07/27/2017 9.4  8.5 - 10.1 MG/DL Final    Bilirubin, total 07/27/2017 0.5  0.2 - 1.0 MG/DL Final    ALT (SGPT) 07/27/2017 33  16 - 61 U/L Final    AST (SGOT) 07/27/2017 19  15 - 37 U/L Final    Alk. phosphatase 07/27/2017 79  45 - 117 U/L Final    Protein, total 07/27/2017 7.3  6.4 - 8.2 g/dL Final    Albumin 07/27/2017 3.8  3.4 - 5.0 g/dL Final    Globulin 07/27/2017 3.5  2.0 - 4.0 g/dL Final    A-G Ratio 07/27/2017 1.1  0.8 - 1.7   Final       AFTER VISIT INSTRUCTIONS       Referral to Dr. Bree combs. Follow up with Vanita Morrow MD in 6 weeks (30m). Complete fasting labs 1-2 weeks prior to the appointment. Please arrive 15 minutes prior to your scheduled appointment time. Call and request an earlier appointment if you have any new questions or concerns. LAB HOURS AT [facility]     Monday-Friday 7a-3p  Closed on holidays    TESTING RESULTS     You will be contacted if your lab results indicate a change in therapy or further evaluation. Results of tests performed in this office will be viewable through [employer]. If you need assistance with accessing your [employer] account, you can call the [address] [place] [address] at #[phone].         STAY UP TO DATE WITH YOUR PREVENTIVE HEALTH   Please review the following recommendations and if you are not sure that your healthcare is up to date, ask your provider at your next scheduled office visit. -- Yearly preventive visits (sometimes called \"physicals\") are recommended for all adults. Medicare and Medicaid do not pay for physicals. Medicare covers a yearly wellness visit that differs in many ways from a traditional physical, but is an opportunity to make sure you are maximizing the preventive services that will be covered by Medicare. Each insurance carrier will have different regulations about what services may be covered, so be sure to talk with your insurance provider before scheduling a visit. -- Colon cancer screening is recommended for all patients 48 and older with either colonoscopy (interval will vary depending on results) or yearly stool testing.      -- Mammogram is recommended every 2 years for women ages 36 to 76. -- Pap smear is recommended every 3-5 years for women aged 24 to 72, unless your cervix has been surgically removed for benign reasons. -- Flu shot is recommended every fall for adults of all ages. -- Prevnar 15 is recommended at the age of 72 for all adults. -- Pneumovax is recommended one year after Prevnar 13 for all adults, but earlier if you have a history of chronic health problems (including diabetes and asthma) or smoking. -- Tetanus boosters are recommended every 10 years for adults of all ages. Medicare and Medicaid do not cover tetanus as a preventive booster, but will pay for patients to receive a booster in the event of certain injuries. MEDICATION REFILLS      -- Controlled substance refills must be obtained during a scheduled office visit with the provider. -- All other medication refills are to be requested by calling your pharmacy during regular office hours. Allow at least 2 business days for processing. Refills will not be provided by the after hours answering service/on call provider.        HOLIDAY CLOSURES:     The office is closed on six major holidays each year:  New Year's Day, July 4th, Memorial Day, Labor Day, Thanksgiving, and [place] Day. Lab and X-ray services are not available on those days.

## 2017-12-20 NOTE — MR AVS SNAPSHOT
Visit Information Date & Time Provider Department Dept. Phone Encounter #  
 12/20/2017  4:30 PM Bria Kennedy, 3 [facility] [phone] [id number] Your Appointments 1/16/2018  8:30 AM  
Follow Up with Bria Kennedy MD  
3 [facility] [address] [facility]) Appt Note: f/u( had stress test done) [address] [facility] [address] [phone]  
  
   
 [address] [facility] [address] [facility] Upcoming Health Maintenance Date Due DTaP/Tdap/Td series (1 - Tdap) 10/6/1969 FOOT EXAM Q1 1/16/2018 MICROALBUMIN Q1 1/16/2018 Pneumococcal 65+ Low/Medium Risk (2 of 2 - PPSV23) 1/17/2018 HEMOGLOBIN A1C Q6M 1/27/2018 LIPID PANEL Q1 4/25/2018 MEDICARE YEARLY EXAM 5/4/2018 EYE EXAM RETINAL OR DILATED Q1 9/29/2018 GLAUCOMA SCREENING Q2Y 9/29/2019 COLONOSCOPY 5/4/2025 Allergies as of 12/20/2017  Review Complete On: 12/20/2017 By: Rei Bonner LPN Severity Noted Reaction Type Reactions Zocor [Simvastatin] Medium 11/01/2013   Side Effect Myalgia Tolerating high dose Atorvastatin Current Immunizations  Reviewed on 1/17/2017 Name Date Influenza High Dose Vaccine PF 9/6/2017 10:14 AM  
 Influenza Vaccine 10/3/2016, 9/2/2015  9:29 AM, 10/8/2014, 10/2/2013, 9/24/2012 Pneumococcal Conjugate (PCV-13) 1/17/2017 Zoster Vaccine, Live 9/24/2012 Not reviewed this visit You Were Diagnosed With   
  
 Codes Comments Abnormal nuclear stress test    -  Primary ICD-10-CM: R94.39 
ICD-9-CM: 794.39 Symptomatic bradycardia     ICD-10-CM: R00.1 ICD-9-CM: 427.89 Closed compression fracture of thoracic vertebra with routine healing, subsequent encounter     ICD-10-CM: S22.000D ICD-9-CM: V54.17  Type 2 diabetes mellitus with diabetic polyneuropathy, without long-term current use of insulin (HCC)     ICD-10-CM: I25.65 
 ICD-9-CM: 250.60, 357.2 S/P AAA repair     ICD-10-CM: Z98.890, Z86.79 
ICD-9-CM: V45.89 Routine adult health maintenance     ICD-10-CM: Z00.00 ICD-9-CM: V70.0 Decreased GFR     ICD-10-CM: R94.4 ICD-9-CM: 794.4 Chest pain, unspecified type     ICD-10-CM: R07.9 ICD-9-CM: 786.50 BMI 33.0-33.9,adult     ICD-10-CM: B19.31 
ICD-9-CM: V85.33   
 NELSON (obstructive sleep apnea)     ICD-10-CM: G47.33 
ICD-9-CM: 327.23 Multilevel degenerative disc disease     ICD-10-CM: M53.9 ICD-9-CM: 722.6 Hypercholesterolemia     ICD-10-CM: E78.00 ICD-9-CM: 272.0 Chronic seasonal allergic rhinitis due to pollen     ICD-10-CM: J30.1 ICD-9-CM: 477.0 Essential hypertension     ICD-10-CM: I10 
ICD-9-CM: 401.9 Restless leg syndrome     ICD-10-CM: G25.81 ICD-9-CM: 333.94 Gastroesophageal reflux disease without esophagitis     ICD-10-CM: K21.9 ICD-9-CM: 530.81 Uncontrolled type 2 diabetes mellitus without complication, without long-term current use of insulin ([facility].)     ICD-10-CM: E11.65 ICD-9-CM: 250.02 Vitals BP Pulse Temp Resp Height(growth percentile) Weight(growth percentile) 134/84 (!) 52 97.9 °F (36.6 °C) (Oral) 20 6' 3\" (1.905 m) 256 lb (116.1 kg) SpO2 BMI Smoking Status 97% 32 kg/m2 Former Smoker Vitals History BMI and BSA Data Body Mass Index Body Surface Area 32 kg/m 2 2.48 m 2 Preferred Pharmacy Pharmacy Name Phone [facility] [address] [phone] Your Updated Medication List  
  
   
This list is accurate as of: 12/20/17  5:07 PM.  Always use your most recent med list.  
  
  
  
  
 ALPRAZolam 0.25 mg tablet Commonly known as:  Carlita Lazara Take 1 Tab by mouth two (2) times daily as needed for Anxiety. Max Daily Amount: 0.5 mg. Indications: anxiety  
  
 aspirin 81 mg chewable tablet Take 81 mg by Mouth Once a Day. atorvastatin 80 mg tablet Commonly known as:  LIPITOR Take 1 Tab by mouth daily. Take 80 mg by Mouth Once a Day. Azelastine 0.15 % (205.5 mcg) nasal spray Commonly known as:  ASTEPRO  
two (2) times a day. Blood-Glucose Meter Misc Uncontrolled DM2 E11.65. BID testing. Disp 1 meter  
  
 carboxymethylcellulose sodium 1 % Dlgl Apply  to eye. Diabetic Supplies, [facility] 24. Mis Commonly known as:  Lancing Device with Lancets DM2 uncontrolled - BG testing BID. fexofenadine-pseudoephedrine 180-240 mg per tablet Commonly known as:  ALLEGRA-D 24 HOUR Take 1 Tab by mouth daily. glipiZIDE 10 mg tablet Commonly known as:  Myrla Nordmann Take 1 Tab by mouth two (2) times a day. * glucose blood VI test strips strip Commonly known as:  FREESTYLE LITE STRIPS  
ICD10 E11.65 - DM2 daily BG testing. * glucose blood VI test strips strip Commonly known as:  blood glucose test  
Uncontrolled DM2 E11.65. BID testing. Disp 2 boxes of 100 each with 3 refills for 90 day supply. Lancets Misc Uncontrolled DM2. BID testing. Disp 2 boxes of 100 each with 3 refills for 90 day supply. meloxicam 7.5 mg tablet Commonly known as:  MOBIC Take 1 Tab by mouth daily as needed for Pain (arthritis). metFORMIN  mg tablet Commonly known as:  GLUCOPHAGE XR Take 1 Tab by mouth two (2) times a day. mometasone 50 mcg/actuation nasal spray Commonly known as:  NASONEX  
2 Sprays by Both Nostrils route daily. naproxen 500 mg tablet Commonly known as:  NAPROSYN Take 1 Tab by mouth two (2) times daily (with meals). Take 1 Tab by Mouth Twice Daily. olopatadine 0.1 % ophthalmic solution Commonly known as:  PATANOL Administer 2 Drops to both eyes two (2) times daily as needed for Allergies. omega-3 fatty acids-vitamin e 1,000 mg Cap Commonly known as:  FISH OIL Take 1 Cap by mouth two (2) times a day. omeprazole 20 mg capsule Commonly known as:  PRILOSEC Take 1 Cap by mouth daily. OTHER Heel cup  Indications: heel pain  
  
 oxyCODONE-acetaminophen 5-325 mg per tablet Commonly known as:  PERCOCET Take 1 Tab by mouth every six (6) hours as needed for Pain. Max Daily Amount: 4 Tabs. pramipexole 0.5 mg tablet Commonly known as:  MIRAPEX Take 1 Tab by mouth three (3) times daily as needed. Indications: Restless Legs Syndrome  
  
 telmisartan-hydroCHLOROthiazide 40-12.5 mg per tablet Commonly known as:  MICARDIS HCT Take 1 Tab by mouth daily. triamcinolone 0.5 % topical cream  
Commonly known as:  ARISTOCORT Apply  to affected area two (2) times a day. use thin layer to affected area * Notice: This list has 2 medication(s) that are the same as other medications prescribed for you. Read the directions carefully, and ask your doctor or other care provider to review them with you. Prescriptions Printed Refills  
 pramipexole (MIRAPEX) 0.5 mg tablet 3 Sig: Take 1 Tab by mouth three (3) times daily as needed. Indications: Restless Legs Syndrome Class: Print Route: Oral  
 omeprazole (PRILOSEC) 20 mg capsule 3 Sig: Take 1 Cap by mouth daily. Class: Print Route: Oral  
 Blood-Glucose Meter misc 0 Sig: Uncontrolled DM2 E11.65. BID testing. Disp 1 meter Class: Print  
 glucose blood VI test strips (BLOOD GLUCOSE TEST) strip 3 Sig: Uncontrolled DM2 E11.65. BID testing. Disp 2 boxes of 100 each with 3 refills for 90 day supply. Class: Print Lancets misc 3 Sig: Uncontrolled DM2. BID testing. Disp 2 boxes of 100 each with 3 refills for 90 day supply. Class: Print We Performed the Following REFERRAL TO CARDIOLOGY [DTX88 Custom] Comments:  
 [phone] (patient's mobile, please use this as primary contact - okay to leave a message with a return #, please speak slowly).   Recent admission for sinus adilene/junctional adilene (symptomatic, but may have been from dehydration) and medium risk abnormal outpatient NST. Referral Information Referral ID Referred By Referred To  
  
 [id number] Kole Velazquez MD   
   [facility] [place] [address] [address], [address] Tory Medel Visits Status Start Date End Date 1 New Request 12/20/17 12/20/18 If your referral has a status of pending review or denied, additional information will be sent to support the outcome of this decision. Patient Instructions SERGO REST/STRESS MULTIPLE SPECT - 12/18/2017 [employer] Component Name Value Ref Range EF Nuc 61    
Result Impression THIS MYOCARDIAL PERFUSION STUDY IS ABNORMAL 
 THIS IS A MEDIUM RISK STUDY There is small to medium size area of mild to moderate reversible perfusion defect in the mid and basal Inferior wall segments 
 consistent with ischemia. NO INFARCTION. THE CALCULATED LVEF WAS 61% NORMAL WALL MOTION. NO PRIOR STUDY FOR COMPARISON. Incidental event. Upper incision of IV patient had a transient unresponsiveness with seizure-like activity and noted to be hypotensive, bradycardic, suggestive of vasovagal episode responded to IV fluid administration. No visits with results within 2 Week(s) from this visit. Latest known visit with results is: 
 
Hospital Outpatient Visit on 07/27/2017 Component Date Value Ref Range Status  Hemoglobin A1c 07/27/2017 7.2* 4.2 - 5.6 % Final  
 Comment: (NOTE) HbA1C Interpretive Ranges <5.7              Normal 
5.7 - 6.4         Consider Prediabetes >6.5              Consider Diabetes  Est. average glucose 07/27/2017 160  mg/dL Final  
 Comment: (NOTE) The eAG should be interpreted with patient characteristics in mind  
since ethnicity, interindividual differences, red cell lifespan,  
variation in rates of glycation, etc. may affect the validity of the  
calculation.  Sodium 07/27/2017 140  136 - 145 mmol/L Final  
 Potassium 07/27/2017 4.2  3.5 - 5.5 mmol/L Final  
 Chloride 07/27/2017 104  100 - 108 mmol/L Final  
 CO2 07/27/2017 29  21 - 32 mmol/L Final  
 Anion gap 07/27/2017 7  3.0 - 18 mmol/L Final  
 Glucose 07/27/2017 122* 74 - 99 mg/dL Final  
 BUN 07/27/2017 19* 7.0 - 18 MG/DL Final  
 Creatinine 07/27/2017 1.23  0.6 - 1.3 MG/DL Final  
 BUN/Creatinine ratio 07/27/2017 15  12 - 20   Final  
 GFR est AA 07/27/2017 >60  >60 ml/min/1.73m2 Final  
 GFR est non-AA 07/27/2017 59* >60 ml/min/1.73m2 Final  
 Comment: (NOTE) Estimated GFR is calculated using the Modification of Diet in Renal  
Disease (MDRD) Study equation, reported for both  Americans [facility]) and non- Americans (GFRNA), and normalized to 1.73m2  
body surface area. The physician must decide which value applies to  
the patient. The MDRD study equation should only be used in  
individuals age 25 or older. It has not been validated for the  
following: pregnant women, patients with serious comorbid conditions,  
or on certain medications, or persons with extremes of body size,  
muscle mass, or nutritional status.  Calcium 07/27/2017 9.4  8.5 - 10.1 MG/DL Final  
 Bilirubin, total 07/27/2017 0.5  0.2 - 1.0 MG/DL Final  
 ALT (SGPT) 07/27/2017 33  16 - 61 U/L Final  
 AST (SGOT) 07/27/2017 19  15 - 37 U/L Final  
 Alk. phosphatase 07/27/2017 79  45 - 117 U/L Final  
 Protein, total 07/27/2017 7.3  6.4 - 8.2 g/dL Final  
 Albumin 07/27/2017 3.8  3.4 - 5.0 g/dL Final  
 Globulin 07/27/2017 3.5  2.0 - 4.0 g/dL Final  
 A-G Ratio 07/27/2017 1.1  0.8 - 1.7   Final  
 
 
AFTER VISIT INSTRUCTIONS Referral to Dr. Waterford Madura placed. Follow up with Jaymie Tejada MD in 6 weeks (30m). Complete fasting labs 1-2 weeks prior to the appointment. Please arrive 15 minutes prior to your scheduled appointment time. Call and request an earlier appointment if you have any new questions or concerns. LAB HOURS AT [facility] Monday-Friday 7a-3p Closed on holidays TESTING RESULTS You will be contacted if your lab results indicate a change in therapy or further evaluation. Results of tests performed in this office will be viewable through [facility]. If you need assistance with accessing your [employer] account, you can call the [address] [place] [address] at #[phone]. STAY UP  [address] Please review the following recommendations and if you are not sure that your healthcare is up to date, ask your provider at your next scheduled office visit. -- Yearly preventive visits (sometimes called \"physicals\") are recommended for all adults. Medicare and Medicaid do not pay for physicals. Medicare covers a yearly wellness visit that differs in many ways from a traditional physical, but is an opportunity to make sure you are maximizing the preventive services that will be covered by Medicare. Each insurance carrier will have different regulations about what services may be covered, so be sure to talk with your insurance provider before scheduling a visit. -- Colon cancer screening is recommended for all patients 48 and older with either colonoscopy (interval will vary depending on results) or yearly stool testing.   
 
-- Mammogram is recommended every 2 years for women ages 36 to 76. -- Pap smear is recommended every 3-5 years for women aged 24 to 72, unless your cervix has been surgically removed for benign reasons. -- Flu shot is recommended every fall for adults of all ages. -- Prevnar 15 is recommended at the age of 72 for all adults. -- Pneumovax is recommended one year after Prevnar 13 for all adults, but earlier if you have a history of chronic health problems (including diabetes and asthma) or smoking. -- Tetanus boosters are recommended every 10 years for adults of all ages. Medicare and Medicaid do not cover tetanus as a preventive booster, but will pay for patients to receive a booster in the event of certain injuries. MEDICATION REFILLS   
 
-- Controlled substance refills must be obtained during a scheduled office visit with the provider. -- All other medication refills are to be requested by calling your pharmacy during regular office hours. Allow at least 2 business days for processing. Refills will not be provided by the after hours answering service/on call provider. HOLIDAY CLOSURES:  
 
The office is closed on six major holidays each year:  New Year's Day, July 4th, Memorial Day, Labor Day, Thanksgiving, and [place] Day. Lab and X-ray services are not available on those days. Introducing [facility] SERVICES! Jozef Hansen introduces [employer] patient portal. Now you can access parts of your medical record, email your doctor's office, and request medication refills online. 1. In your internet browser, go to [email or web address] 2. Click on the First Time User? Click Here link in the Sign In box. You will see the New Member Sign Up page. 3. Enter your [employer] Access Code exactly as it appears below. You will not need to use this code after youve completed the sign-up process. If you do not sign up before the expiration date, you must request a new code. · [employer] Access Code: [email or web address] Expires: 3/20/2018  5:07 PM 
 
4. Enter the last four digits of your Social Security Number (xxxx) and Date of Birth (mm/dd/yyyy) as indicated and click Submit. You will be taken to the next sign-up page. 5. Create a [employer] ID. This will be your [employer] login ID and cannot be changed, so think of one that is secure and easy to remember. 6. Create a [employer] password. You can change your password at any time. 7. Enter your Password Reset Question and Answer. This can be used at a later time if you forget your password. 8. Enter your e-mail address. You will receive e-mail notification when new information is available in [address] 9. Click Sign Up. You can now view and download portions of your medical record. 10. Click the Download Summary menu link to download a portable copy of your medical information. If you have questions, please visit the Frequently Asked Questions section of the [employer] website. Remember, [employer] is NOT to be used for urgent needs. For medical emergencies, dial 911. Now available from your iPhone and Android! Please provide this summary of care documentation to your next provider. Your primary care clinician is listed as Oswaldo Meadows. If you have any questions after today's visit, please call [phone].

## 2017-12-20 NOTE — PROGRESS NOTES
L  PRE-VISIT PLANNING:     Future Appointments  Date Time Provider Nora Youngi   2017 4:30 PM Pascale Villegas MD [facility]   2018 8:30 AM Pascale Villegas MD [address] [place] (PCP is Pascale Villegas MD) is scheduled for an office visit with Pascale Villegas MD on 2017 for hospital follow up. Encounter opened prior to visit to complete pre-visit planning, update and review pertinent sections in the chart. Last office visit with PCP was 2017. Rooming Nurse Items:  -- Instruct patient to remove socks and shoes prior to provider entering room if they are willing to have diabetic foot exam     Pending items for provider to review with patient:  Health Maintenance Due   Topic Date Due    DTaP/Tdap/Td series (1 - Tdap) 10/06/1969    FOOT EXAM Q1  2018    MICROALBUMIN Q1  2018          Internal Medicine  Transition of Care Note/Hospital or Rehab Follow up  [address] [address] [place], [place], [address] [facility]  Phone [phone]; Fax [phone]    Date of Service:  2017  Patient's Name: Shahla Silva   Patient's :  1950   Patient's Age:  69 y.o. Patient's Gender:  male     Subjective Hx:       Chief Complaint   Patient presents with   [facility] Follow Up     [facility] 12/15/17-17 for symptomatic bradycardia      Shahla Silva presented today for follow up after hospitalization at [facility] from 12/15/17-17 for symptomatic bradycardia, presented with chest pain and diaphoresis. Hospital diagnoses and hospital course reviewed. Testing results reviewed. Cardiology eval done while inpatient. Inpatient serial cardiac enzymes negative, sinus adilene and junctional adilene noted, patient asymptomatic while inpatient, requested d/c with outpatient follow up with Dr. Rohini Jimenez (cardiology).   Patient instructed to follow up with PCP, cardiology and sleep medicine as outpatient (to address NELSON and re-trial CPAP vs oral appliance). Outpatient NST was completed 12/18/17 (abnormal study, see report impression below). Patient reports he hasn't had any syncope or near-syncope since infusion of IV during stress test.  He feels well and has been hunting in [facility] as per his usual routine from Nov - Jan.  His wife is out of the country and will return on Jan 9th. He has not yet scheduled cardiology follow up with Dr. Abel Juan. SERGO REST/STRESS MULTIPLE SPECT - 12/18/2017  [employer]  Component Name Value Ref Range   EF Nuc 61     Result Impression    THIS MYOCARDIAL PERFUSION STUDY IS ABNORMAL   THIS IS A MEDIUM RISK STUDY   There is small to medium size area of mild to moderate reversible perfusion defect in the mid and basal Inferior wall segments   consistent with ischemia. NO INFARCTION. THE CALCULATED LVEF WAS 61% NORMAL WALL MOTION. NO PRIOR STUDY FOR COMPARISON. Incidental event. Upper incision of IV patient had a transient unresponsiveness with seizure-like activity and noted to be hypotensive, bradycardic, suggestive of vasovagal episode responded to IV fluid administration. Patient contacted by office on 12/18/17 to arrange appointment. Hospital notes, testing results and discharge summary reviewed and briefly summarized as above. PMHx and Problem list were reviewed and annotated with pertinent information. Medication list updated as below.        Patient Active Problem List    Diagnosis    Type 2 diabetes mellitus, without long-term current use of insulin (HCC)     Metformin  mg PO BID, Glipizide 10 mg BID, on statin, ARB and ASA 81 mg  Abnormal monofilament testing 1/16/17      Symptomatic bradycardia    Chest pain    Diabetic polyneuropathy associated with type 2 diabetes mellitus ([address]    Thoracic compression fracture ([address]     T11 - Managed by Dr. Vargas Severe Bone lesion left femur     Asymptomatic, MRI planned for October      Avascular necrosis of bones of both hips ([address]     MRI L-spine revealed bialteral AVN femoral heads (MRI/CT dx, report submitted for scannin)      Compression fracture of thoracic vertebra ([address]    Aortic root dilation ([address]     Borderline on echo June 8 2015 (done through [facility])      Avascular necrosis of femoral head (HCC)     BILATERAL, noted on CT 2/24/15 ([facility]) - Avascular necrosis involving both femoral heads. No femoral head collapse identified. 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 Paula Marc ([facility]) and Dr. Kayleigh Ceja ([address] [place] [address])        Hypogonadism male     10/6/2010 -- Testosterone 170.0      NELSON (obstructive sleep apnea)     Unable to tolerate CPAP      Essential hypertension     Micardis 40-12.5 mg daily      BMI 33.0-33.9,adult     9/6/17:  BMI 32.82      Restless leg syndrome    Hypercholesterolemia     Atorvastatin 80 mg PO QHS and omega oils      Arthritis     Shoulders L > R, knees, back      Acid reflux    S/P AAA repair     Surgery in 2009 by Dr. Glenn Hicks ([address])   8/24/2011 -- Abd/Retroperitoneal US  -- Limited eval of abd aorta due to overlying bowel gas. Echogenic sludge in gallbladder, hepatic steatosis. Proximal aorta not visualized.   Mid abd ao 2.5 x 2.5 cm, distal portion 2.3 x 2.2 cm, R iliac 1.1 x 1.1 cm and left iliac 1 x 1.1 cm.    2/7/2013 -- CTA w Intact aortic aneurysm repair, dilatation of common iliac arteries to 1.8 cm b/l with small dissections or ulcerations, infarction of lower pole of right kidney  12/15/17 -- CTA chest/abd/pelvis w stable infrarenal abdominal aortic aneurysm and benign appearing pulmonary nodules stable since 2/2015                   Physical Assessment:   VS:    Visit Vitals    /84  Comment: manual BP    Pulse (!) 52    Temp 97.9 °F (36.6 °C) (Oral)    Resp 20    Ht 6' 3\" (1.905 m)    Wt 256 lb (116.1 kg)    SpO2 97%    BMI 32 kg/m2        General:   Pleasant, overweight, well-appearing male seated comfortably,         Well-nourished, well-groomed, conversant, alert, in no acute distress. Psych:  Affect pleasant, appropriate,  interactive, facies and mood are congruent,     behavior and conversation are appropriate, thought process linear and logical     Memory appears to be normal throughout interview  HEENT:  Normocephalic, atraumatic, MMM, PERRL, EOMI   Neck:  Neck supple with normal ROM for age    No thyromegaly or nodules, no LAD  Cardiovasc:   Regular rate and rhythm, no murmurs, no rubs, no gallops  Pulmonary:   Clear breath sounds bilaterally, good air movement,    No wheezing, no rales, no rhonchi, normal respiratory effort  Abdomen:   Abdomen soft, nontender, nondistended, NABS, no masses  Extremities:   No pitting dependent edema    No tenderness with palpation of calves    Warm and well-perfused at distal extremities  Neuro:   Alert, conversant, following commands, no focal deficits. Ambulating with narrow based, stable gait without use of assistive device  Skin:    Warm, dry, normal pallor, no rashes noted. Assessment/Plan:        Asymptomatic and feeling well after hospitalization, but with abnormal NST. Urgent referral placed to Dr. Julio C Barnett, patient instructed to look for call from cardiology office in regards to timing of follow up and advised cardiac cath for diagnosis and possible treatment is indicated. He wishes to return to hunting and wait for his wife to fly back to the [address] [place] [address],[address] before proceeding with further eval.  He notes [employer] has paramedics <5 miles from any deer blinds on property and that several of his hunting buddies are CPR certified.   States new cell tower was just put in and he and his hunting buddies get very good cell reception. He was advised that there is still risk to being alone in a hunting blind, but if he elects to go hunting he should avoid overexertion, make sure cell is charged and on his person, advise both the hunt club and his hunting buddies re: medical situation and be prepared to return to [facility] if Dr. Marysol Jones feels follow up/cath should be done sooner. Patient instructions:  Referral to Dr. Marysol Jones placed. Follow up with Bria Kennedy MD in 6 weeks (30m). Complete fasting labs 1-2 weeks prior to the appointment. Please arrive 15 minutes prior to your scheduled appointment time. Call and request an earlier appointment if you have any new questions or concerns. Follow up with Bria Kennedy MD per routine. Call and request an earlier appointment with your PCP if you have any new questions or concerns or if your symptoms fail to improve as expected. Diagnoses and all orders for this visit:    1. Abnormal nuclear stress test  -     REFERRAL TO CARDIOLOGY    2. Symptomatic bradycardia  -     REFERRAL TO CARDIOLOGY    3. Closed compression fracture of thoracic vertebra with routine healing, subsequent encounter    4. Type 2 diabetes mellitus with diabetic polyneuropathy, without long-term current use of insulin ([facility]    5. S/P AAA repair    6. Routine adult health maintenance    7. Decreased GFR    8. Chest pain, unspecified type    9. BMI 33.0-33.9,adult    10. NELSON (obstructive sleep apnea)    11. Multilevel degenerative disc disease    12. Hypercholesterolemia    13. Chronic seasonal allergic rhinitis due to pollen    14. Essential hypertension    15. Restless leg syndrome  -     pramipexole (MIRAPEX) 0.5 mg tablet; Take 1 Tab by mouth three (3) times daily as needed. Indications: Restless Legs Syndrome    16. Gastroesophageal reflux disease without esophagitis  -     omeprazole (PRILOSEC) 20 mg capsule; Take 1 Cap by mouth daily.     17. Uncontrolled type 2 diabetes mellitus without complication, without long-term current use of insulin (HCC)  -     Blood-Glucose Meter misc; Uncontrolled DM2 E11.65. BID testing. Disp 1 meter  -     glucose blood VI test strips (BLOOD GLUCOSE TEST) strip; Uncontrolled DM2 E11.65. BID testing. Disp 2 boxes of 100 each with 3 refills for 90 day supply. -     Lancets misc; Uncontrolled DM2. BID testing. Disp 2 boxes of 100 each with 3 refills for 90 day supply. The patient states understanding of recommended treatment plan.       Lázaro Randhawa MD - Internal Medicine  12/23/2017, 8:35 PM    Patient Care Team:  Lázaro Randhawa MD as PCP - General (Internal Medicine)  Kimberly Bolton, NICK as Nurse Navigator  Deborah Lindsey MD as Consulting Provider (Orthopedic Surgery)  Génesis Garcia MD (General Surgery)  Vanessa Ramirez MD (Vascular Surgery)  Alie Dominguez DO as Consulting Provider (Physical Medicine and Rehab)  Iram Fritz MD as Consulting Provider (Neurosurgery)     Active Problem List:      Patient Active Problem List   Diagnosis Code    Type 2 diabetes mellitus, without long-term current use of insulin ([address] E11.9    Hypercholesterolemia E78.00    Arthritis M19.90    Acid reflux K21.9    Restless leg syndrome G25.81    Multilevel degenerative disc disease M53.9    Hypogonadism male E29.1    NELSON (obstructive sleep apnea) G47.33    Essential hypertension I10    BMI 33.0-33.9,adult Z68.33    S/P AAA repair Z98.890, Z86.79    Decreased GFR R94.4    Chronic seasonal allergic rhinitis due to pollen J30.1    Tinnitus of both ears H93.13    Avascular necrosis of femoral head ([address] M87.059    Aortic root dilation (HCC) I77.810    Thoracic compression fracture ([address] S22.000A    Bone lesion left femur M89.9    Compression fracture of thoracic vertebra ([address] S22.000A    Avascular necrosis of bones of both hips ([address] M87.051, M87.052    Diabetic polyneuropathy associated with type 2 diabetes mellitus ([address] E11.42    Symptomatic bradycardia R00.1    Chest pain R07.9       Medications:     Current Outpatient Prescriptions   Medication Sig    pramipexole (MIRAPEX) 0.5 mg tablet Take 1 Tab by mouth three (3) times daily as needed. Indications: Restless Legs Syndrome    omeprazole (PRILOSEC) 20 mg capsule Take 1 Cap by mouth daily.  Blood-Glucose Meter misc Uncontrolled DM2 E11.65. BID testing. Disp 1 meter    glucose blood VI test strips (BLOOD GLUCOSE TEST) strip Uncontrolled DM2 E11.65. BID testing. Disp 2 boxes of 100 each with 3 refills for 90 day supply.  Lancets misc Uncontrolled DM2. BID testing. Disp 2 boxes of 100 each with 3 refills for 90 day supply.  atorvastatin (LIPITOR) 80 mg tablet Take 1 Tab by mouth daily. Take 80 mg by Mouth Once a Day.  metFORMIN ER (GLUCOPHAGE XR) 750 mg tablet Take 1 Tab by mouth two (2) times a day.  Azelastine (ASTEPRO) 0.15 % (205.5 mcg) nasal spray two (2) times a day.  glipiZIDE (GLUCOTROL) 10 mg tablet Take 1 Tab by mouth two (2) times a day.  ALPRAZolam (XANAX) 0.25 mg tablet Take 1 Tab by mouth two (2) times daily as needed for Anxiety. Max Daily Amount: 0.5 mg. Indications: anxiety    naproxen (NAPROSYN) 500 mg tablet Take 1 Tab by mouth two (2) times daily (with meals). Take 1 Tab by Mouth Twice Daily.  glucose blood VI test strips (FREESTYLE LITE STRIPS) strip ICD10 E11.65 - DM2 daily BG testing.  oxyCODONE-acetaminophen (PERCOCET) 5-325 mg per tablet Take 1 Tab by mouth every six (6) hours as needed for Pain. Max Daily Amount: 4 Tabs.  mometasone (NASONEX) 50 mcg/actuation nasal spray 2 Sprays by Both Nostrils route daily.  telmisartan-hydrochlorothiazide (MICARDIS HCT) 40-12.5 mg per tablet Take 1 Tab by mouth daily.  Diabetic Supplies, [address] (LANCING DEVICE WITH LANCETS) misc DM2 uncontrolled - BG testing BID.     olopatadine (PATANOL) 0.1 % ophthalmic solution Administer 2 Drops to both eyes two (2) times daily as needed for Allergies.  triamcinolone (ARISTOCORT) 0.5 % topical cream Apply  to affected area two (2) times a day. use thin layer to affected area    aspirin 81 mg chewable tablet Take 81 mg by Mouth Once a Day.  omega-3 fatty acids-vitamin e (FISH OIL) 1,000 mg cap Take 1 Cap by mouth two (2) times a day.  meloxicam (MOBIC) 7.5 mg tablet Take 1 Tab by mouth daily as needed for Pain (arthritis).  fexofenadine-pseudoephedrine (ALLEGRA-D 24 HOUR) 180-240 mg per tablet Take 1 Tab by mouth daily.  carboxymethylcellulose sodium 1 % DLGl Apply  to eye.  OTHER Heel cup  Indications: heel pain     No current facility-administered medications for this visit. Additional History     Past Surgical History:   Procedure Laterality Date    HX LUMBAR DISKECTOMY N/A 1980    HX ORTHOPAEDIC      left shoulder    VASCULAR SURGERY PROCEDURE UNLIST N/A     AAA repair with stent, [facility]     Social History     Social History    Marital status:      Spouse name: N/A    Number of children: N/A    Years of education: N/A     Occupational History    retired army      Social History Main Topics    Smoking status: Former Smoker     Types: Cigarettes, Pipe    Smokeless tobacco: Former User      Comment: Quit 43 years ago (2015), pipe 10+ years ago    Alcohol use 0.0 oz/week     0 Glasses of wine per week      Comment: Occasional beer, liquor or wine.   No heavy use    Drug use: No    Sexual activity: Yes     Partners: Female     Birth control/ protection: None     Other Topics Concern    None     Social History Narrative    , wife is Benoit Craft daughter lives in [address] [place] [address],[address] in [facility] during deer season (rifles mostly), Nov - Jan     Family History   Problem Relation Age of Onset    Cancer Mother      Rectal, metastasized late 76s    Heart Disease Father     Cancer Brother      Metastatic, not sure of primary    Cancer Maternal Uncle     Cancer Maternal Aunt      Allergies   Allergen Reactions    Zocor [Simvastatin] Myalgia     Tolerating high dose Atorvastatin       Encounter Diagnoses:     Encounter Diagnoses     ICD-10-CM ICD-9-CM   1. Abnormal nuclear stress test R94.39 794.39   2. Symptomatic bradycardia R00.1 427.89   3. Closed compression fracture of thoracic vertebra with routine healing, subsequent encounter S22.000D V54.17   4. Type 2 diabetes mellitus with diabetic polyneuropathy, without long-term current use of insulin ([facility]) E11.42 250.60     357.2   5. S/P AAA repair Z98.890 V45.89    Z86.79    6. Routine adult health maintenance Z00.00 V70.0   7. Decreased GFR R94.4 794.4   8. Chest pain, unspecified type R07.9 786.50   9. BMI 33.0-33.9,adult Z68.33 V85.33   10. NELSON (obstructive sleep apnea) G47.33 327.23   11. Multilevel degenerative disc disease M53.9 722.6   12. Hypercholesterolemia E78.00 272.0   13. Chronic seasonal allergic rhinitis due to pollen J30.1 477.0   14. Essential hypertension I10 401.9   15. Restless leg syndrome G25.81 333.94   16. Gastroesophageal reflux disease without esophagitis K21.9 530.81   17. Uncontrolled type 2 diabetes mellitus without complication, without long-term current use of insulin ([facility] E11.65 250.02            This document may have been created with the aid of dictation software. Text may contain errors, particularly phonetic errors.

## 2017-12-21 NOTE — TELEPHONE ENCOUNTER
[facility] Cardiology Specialist and spoke to [place], [place] to read the comments of the pts referral. Referral was done and faxed by Amanda Morris, Radiology.  Thank you

## 2017-12-27 ENCOUNTER — PATIENT OUTREACH (OUTPATIENT)
Dept: FAMILY MEDICINE CLINIC | Age: 67
End: 2017-12-27

## 2017-12-27 NOTE — PROGRESS NOTES
Patient Outreached Attempted. Received patient's voicemail box. Message left requesting call back.   Chart reviewed- Appointment 1/16/18 With George Farooq at 21  am, Cardiology appointment 2/19/18 at 10 am with Ady Escalera

## 2018-01-10 ENCOUNTER — PATIENT OUTREACH (OUTPATIENT)
Dept: FAMILY MEDICINE CLINIC | Age: 68
End: 2018-01-10

## 2018-01-10 NOTE — PROGRESS NOTES
Patient Outreach made to patient. He states he is doing well. He just finishing hunting now. He did receive my last message but he was out hunting that day and forgot to return call. The reception in the area for hunting is not as good as he thought. I reminded him of his appointment on 1/16/18 at 8:30 am with . He confirmed and also have an appointment with cardiology next month.

## 2018-01-19 ENCOUNTER — TELEPHONE (OUTPATIENT)
Dept: FAMILY MEDICINE CLINIC | Age: 68
End: 2018-01-19

## 2018-01-22 NOTE — TELEPHONE ENCOUNTER
Spoke with pt he states he feels great and has nad no problems. He is scheduled to see cardio on 2/9/18. Pt states he has not taken his metformin since his stress test last month and wanted to know should he start it again? Please advise.

## 2018-03-02 RX ORDER — GLIPIZIDE 10 MG/1
10 TABLET ORAL 2 TIMES DAILY
Qty: 180 TAB | Refills: 3 | Status: SHIPPED | OUTPATIENT
Start: 2018-03-02 | End: 2019-04-01

## 2018-03-05 PROBLEM — I25.10 CORONARY ARTERY DISEASE INVOLVING NATIVE CORONARY ARTERY OF NATIVE HEART WITHOUT ANGINA PECTORIS: Chronic | Status: ACTIVE | Noted: 2018-03-05

## 2018-03-05 PROBLEM — R07.9 CHEST PAIN: Status: RESOLVED | Noted: 2017-12-15 | Resolved: 2018-03-05

## 2018-03-05 PROBLEM — I25.10 CORONARY ARTERY DISEASE INVOLVING NATIVE CORONARY ARTERY OF NATIVE HEART WITHOUT ANGINA PECTORIS: Status: ACTIVE | Noted: 2018-03-05

## 2018-03-08 RX ORDER — TELMISARTAN AND HYDROCHLORTHIAZIDE 40; 12.5 MG/1; MG/1
1 TABLET ORAL DAILY
Qty: 90 TAB | Refills: 0 | Status: SHIPPED | OUTPATIENT
Start: 2018-03-08 | End: 2018-07-19 | Stop reason: SDUPTHER

## 2018-04-18 ENCOUNTER — TELEPHONE (OUTPATIENT)
Dept: FAMILY MEDICINE CLINIC | Age: 68
End: 2018-04-18

## 2018-07-12 ENCOUNTER — TELEPHONE (OUTPATIENT)
Dept: FAMILY MEDICINE CLINIC | Age: 68
End: 2018-07-12

## 2018-07-12 DIAGNOSIS — I10 ESSENTIAL HYPERTENSION: Chronic | ICD-10-CM

## 2018-07-12 DIAGNOSIS — R94.4 DECREASED GFR: Chronic | ICD-10-CM

## 2018-07-12 DIAGNOSIS — E78.00 HYPERCHOLESTEROLEMIA: Chronic | ICD-10-CM

## 2018-07-12 DIAGNOSIS — E11.42 TYPE 2 DIABETES MELLITUS WITH DIABETIC POLYNEUROPATHY, WITHOUT LONG-TERM CURRENT USE OF INSULIN (HCC): Primary | Chronic | ICD-10-CM

## 2018-07-12 NOTE — TELEPHONE ENCOUNTER
Fasting bloodwork and urine screening to be done 1-2 weeks prior to scheduled office visit. Nothing but water and meds for 8 hours prior to lab draw.    Future Appointments  Date Time Provider Nora Bullard   7/19/2018 11:30 AM Jeri Contreras MD [facility] [place]      Orders Placed This Encounter    HEMOGLOBIN A1C WITH EAG    CBC WITH AUTOMATED DIFF    METABOLIC PANEL, COMPREHENSIVE    LIPID PANEL    MICROALBUMIN, UR, RAND W/ MICROALB/CREAT RATIO    URINALYSIS W/ RFLX MICROSCOPIC

## 2018-07-16 ENCOUNTER — HOSPITAL ENCOUNTER (OUTPATIENT)
Dept: LAB | Age: 68
Discharge: HOME | End: 2018-07-16
Payer: MEDICARE

## 2018-07-16 DIAGNOSIS — I10 ESSENTIAL HYPERTENSION: Chronic | ICD-10-CM

## 2018-07-16 DIAGNOSIS — E78.00 HYPERCHOLESTEROLEMIA: Chronic | ICD-10-CM

## 2018-07-16 DIAGNOSIS — R94.4 DECREASED GFR: Chronic | ICD-10-CM

## 2018-07-16 DIAGNOSIS — E11.42 TYPE 2 DIABETES MELLITUS WITH DIABETIC POLYNEUROPATHY, WITHOUT LONG-TERM CURRENT USE OF INSULIN (HCC): Chronic | ICD-10-CM

## 2018-07-16 LAB
ALBUMIN SERPL-MCNC: 3.7 G/DL (ref 3.4–5)
ALBUMIN/GLOB SERPL: 1.1 {RATIO} (ref 0.8–1.7)
ALP SERPL-CCNC: 74 U/L (ref 45–117)
ALT SERPL-CCNC: 23 U/L (ref 16–61)
ANION GAP SERPL CALC-SCNC: 10 MMOL/L (ref 3–18)
APPEARANCE UR: CLEAR
AST SERPL-CCNC: 13 U/L (ref 15–37)
BASOPHILS # BLD: 0 K/UL (ref 0–0.1)
BASOPHILS NFR BLD: 1 % (ref 0–2)
BILIRUB SERPL-MCNC: 0.4 MG/DL (ref 0.2–1)
BILIRUB UR QL: NEGATIVE
BUN SERPL-MCNC: 16 MG/DL (ref 7–18)
BUN/CREAT SERPL: 15 (ref 12–20)
CALCIUM SERPL-MCNC: 9 MG/DL (ref 8.5–10.1)
CHLORIDE SERPL-SCNC: 106 MMOL/L (ref 100–108)
CHOLEST SERPL-MCNC: 137 MG/DL
CO2 SERPL-SCNC: 25 MMOL/L (ref 21–32)
COLOR UR: ABNORMAL
CREAT SERPL-MCNC: 1.06 MG/DL (ref 0.6–1.3)
CREAT UR-MCNC: 264.8 MG/DL (ref 30–125)
DIFFERENTIAL METHOD BLD: ABNORMAL
EOSINOPHIL # BLD: 0.2 K/UL (ref 0–0.4)
EOSINOPHIL NFR BLD: 4 % (ref 0–5)
ERYTHROCYTE [DISTWIDTH] IN BLOOD BY AUTOMATED COUNT: 14.3 % (ref 11.6–14.5)
GLOBULIN SER CALC-MCNC: 3.5 G/DL (ref 2–4)
GLUCOSE SERPL-MCNC: 135 MG/DL (ref 74–99)
GLUCOSE UR STRIP.AUTO-MCNC: NEGATIVE MG/DL
HCT VFR BLD AUTO: 40.8 % (ref 36–48)
HDLC SERPL-MCNC: 35 MG/DL (ref 40–60)
HDLC SERPL: 3.9 {RATIO} (ref 0–5)
HGB BLD-MCNC: 13.2 G/DL (ref 13–16)
HGB UR QL STRIP: NEGATIVE
KETONES UR QL STRIP.AUTO: ABNORMAL MG/DL
LDLC SERPL CALC-MCNC: 70 MG/DL (ref 0–100)
LEUKOCYTE ESTERASE UR QL STRIP.AUTO: NEGATIVE
LIPID PROFILE,FLP: ABNORMAL
LYMPHOCYTES # BLD: 1.6 K/UL (ref 0.9–3.6)
LYMPHOCYTES NFR BLD: 25 % (ref 21–52)
MCH RBC QN AUTO: 29.7 PG (ref 24–34)
MCHC RBC AUTO-ENTMCNC: 32.4 G/DL (ref 31–37)
MCV RBC AUTO: 91.9 FL (ref 74–97)
MICROALBUMIN UR-MCNC: 1.7 MG/DL (ref 0–3)
MICROALBUMIN/CREAT UR-RTO: 6 MG/G (ref 0–30)
MONOCYTES # BLD: 0.6 K/UL (ref 0.05–1.2)
MONOCYTES NFR BLD: 9 % (ref 3–10)
NEUTS SEG # BLD: 4 K/UL (ref 1.8–8)
NEUTS SEG NFR BLD: 61 % (ref 40–73)
NITRITE UR QL STRIP.AUTO: NEGATIVE
PH UR STRIP: 5 [PH] (ref 5–8)
PLATELET # BLD AUTO: 239 K/UL (ref 135–420)
PMV BLD AUTO: 10 FL (ref 9.2–11.8)
POTASSIUM SERPL-SCNC: 4.5 MMOL/L (ref 3.5–5.5)
PROT SERPL-MCNC: 7.2 G/DL (ref 6.4–8.2)
PROT UR STRIP-MCNC: NEGATIVE MG/DL
RBC # BLD AUTO: 4.44 M/UL (ref 4.7–5.5)
SODIUM SERPL-SCNC: 141 MMOL/L (ref 136–145)
SP GR UR REFRACTOMETRY: 1.03 (ref 1–1.03)
TRIGL SERPL-MCNC: 160 MG/DL (ref ?–150)
UROBILINOGEN UR QL STRIP.AUTO: 0.2 EU/DL (ref 0.2–1)
VLDLC SERPL CALC-MCNC: 32 MG/DL
WBC # BLD AUTO: 6.5 K/UL (ref 4.6–13.2)

## 2018-07-16 PROCEDURE — 82043 UR ALBUMIN QUANTITATIVE: CPT | Performed by: INTERNAL MEDICINE

## 2018-07-16 PROCEDURE — 83036 HEMOGLOBIN GLYCOSYLATED A1C: CPT | Performed by: INTERNAL MEDICINE

## 2018-07-16 PROCEDURE — 81003 URINALYSIS AUTO W/O SCOPE: CPT | Performed by: INTERNAL MEDICINE

## 2018-07-16 PROCEDURE — 80061 LIPID PANEL: CPT | Performed by: INTERNAL MEDICINE

## 2018-07-16 PROCEDURE — 80053 COMPREHEN METABOLIC PANEL: CPT | Performed by: INTERNAL MEDICINE

## 2018-07-16 PROCEDURE — 36415 COLL VENOUS BLD VENIPUNCTURE: CPT | Performed by: INTERNAL MEDICINE

## 2018-07-16 PROCEDURE — 85025 COMPLETE CBC W/AUTO DIFF WBC: CPT | Performed by: INTERNAL MEDICINE

## 2018-07-17 LAB
EST. AVERAGE GLUCOSE BLD GHB EST-MCNC: 163 MG/DL
HBA1C MFR BLD: 7.3 % (ref 4.2–5.6)

## 2018-07-19 ENCOUNTER — OFFICE VISIT (OUTPATIENT)
Dept: FAMILY MEDICINE CLINIC | Age: 68
End: 2018-07-19

## 2018-07-19 VITALS
WEIGHT: 268.4 LBS | HEART RATE: 67 BPM | DIASTOLIC BLOOD PRESSURE: 60 MMHG | BODY MASS INDEX: 33.37 KG/M2 | TEMPERATURE: 98.2 F | RESPIRATION RATE: 20 BRPM | OXYGEN SATURATION: 96 % | HEIGHT: 75 IN | SYSTOLIC BLOOD PRESSURE: 120 MMHG

## 2018-07-19 DIAGNOSIS — R94.4 DECREASED GFR: Chronic | ICD-10-CM

## 2018-07-19 DIAGNOSIS — Z23 ENCOUNTER FOR IMMUNIZATION: ICD-10-CM

## 2018-07-19 DIAGNOSIS — E11.42 DIABETIC POLYNEUROPATHY ASSOCIATED WITH TYPE 2 DIABETES MELLITUS (HCC): Primary | ICD-10-CM

## 2018-07-19 DIAGNOSIS — I10 ESSENTIAL HYPERTENSION: Chronic | ICD-10-CM

## 2018-07-19 DIAGNOSIS — Z13.31 SCREENING FOR DEPRESSION: ICD-10-CM

## 2018-07-19 DIAGNOSIS — Z13.39 SCREENING FOR ALCOHOLISM: ICD-10-CM

## 2018-07-19 DIAGNOSIS — I25.10 CORONARY ARTERY DISEASE INVOLVING NATIVE CORONARY ARTERY OF NATIVE HEART WITHOUT ANGINA PECTORIS: Chronic | ICD-10-CM

## 2018-07-19 DIAGNOSIS — E11.42 TYPE 2 DIABETES MELLITUS WITH DIABETIC POLYNEUROPATHY, WITHOUT LONG-TERM CURRENT USE OF INSULIN (HCC): Chronic | ICD-10-CM

## 2018-07-19 DIAGNOSIS — M89.9 BONE LESION: ICD-10-CM

## 2018-07-19 RX ORDER — ISOSORBIDE MONONITRATE 30 MG/1
TABLET, EXTENDED RELEASE ORAL DAILY
COMMUNITY
End: 2019-10-01

## 2018-07-19 RX ORDER — TELMISARTAN AND HYDROCHLORTHIAZIDE 40; 12.5 MG/1; MG/1
1 TABLET ORAL DAILY
Qty: 90 TAB | Refills: 3 | Status: SHIPPED | OUTPATIENT
Start: 2018-07-19 | End: 2019-05-30 | Stop reason: SDUPTHER

## 2018-07-19 NOTE — PROGRESS NOTES
Internal Medicine  Subsequent Annual Wellness Visit (AWV)   3 [facility]  [address], [place], [facility], [address]  Phone [phone]; Fax [phone]    Date of Service:  2018  Patient's Name & :   Ben Medrano -    Patient's PCP:  Osvaldo Castro MD     HPI   Ben Medrano is a 69 y.o. patient who was seen today for a Welcome to Medicare visit. He  has a past medical history of Abrasion of skin (2016); Acid reflux; ACP (advance care planning); ACP (advance care planning); Aneurysm ([address] Aortic root dilation ([address] Arthritis; Avascular necrosis of bones of both hips ([address] (2016); Avascular necrosis of femoral head ([address] (2015); Back pain, thoracic; Bilateral arm pain (2016); Bilateral low back pain without sciatica (2016); BMI 33.0-33.9,adult; Bone lesion left femur; Brachial neuritis (3/24/2014); Carpal tunnel syndrome, bilateral; Cervical spondylosis; Chest pain (12/15/2017); Chronic pain; Compression fracture of thoracic vertebra (HCC) (2016); Coronary artery disease involving native coronary artery of native heart without angina pectoris; DDD (degenerative disc disease), lumbosacral; DDD (degenerative disc disease), lumbosacral; Decreased libido (3/19/2014); Dental disorder; Diabetes ([address] Elevated transaminase level; Essential hypertension; Fall through floor (2016); GERD (gastroesophageal reflux disease); Herniated nucleus pulposus; History of poliomyelitis; History of shingles (2013); Hypercholesterolemia; Hypoglycemia (5/3/2017); Hypogonadism male; Impingement syndrome of left shoulder (2013); Internal hemorrhoids; Long term (current) use of anticoagulants; Low serum testosterone level (3/24/2014); Normal cardiac stress test (2012); NELSON (obstructive sleep apnea); Osteoarthritis of right knee (2013); Pulmonary nodule, left (2013);  Restless leg syndrome; Routine adult health maintenance (11/21/2013); S/P AAA repair; Seasonal allergic reaction; Spinal stenosis in cervical region (3/24/2014); and Type 2 diabetes mellitus, controlled ([facility] He also has no past medical history of Anemia; Arrhythmia; Asthma; Autoimmune disease ([address] Calculus of kidney; Cancer ([address] Chronic kidney disease; Chronic obstructive pulmonary disease ([address] Congestive heart failure, unspecified; Depression; Headache; History of abuse; Liver disease; Psychotic disorder; PUD (peptic ulcer disease); Seizures ([address] Stool color black; Stroke ([facility] Thromboembolus ([facility] Thyroid disease; or Trauma. .  See separate E&M note from 07/19/2018 for ROS, PhyExam, PMHx, PSHx, Allergies, Meds. Health Maintenance Status     Health Maintenance   Topic Date Due    Pneumococcal 65+ Low/Medium Risk (2 of 2 - PPSV23) 01/17/2018    MEDICARE YEARLY EXAM  05/04/2018    DTaP/Tdap/Td series (1 - Tdap) 07/19/2019 (Originally 10/6/1969)    FOOT EXAM Q1  07/19/2019 (Originally 1/16/2018)    Influenza Age 9 to Adult  08/01/2018    HEMOGLOBIN A1C Q6M  01/16/2019    EYE EXAM RETINAL OR DILATED Q1  01/17/2019    MICROALBUMIN Q1  07/16/2019    LIPID PANEL Q1  07/16/2019    GLAUCOMA SCREENING Q2Y  01/17/2020    COLONOSCOPY  05/04/2025    Hepatitis C Screening  Completed    ZOSTER VACCINE AGE 60>  Completed       Assessment, Orders   Shingrix from  pharmacy/DOD  Pneumovax 23 given today to complete pneumococcal series   ACP packet offered    Education and counseling provided regarding age, gender and functional status appropriate HM and screening issues -  discussed with the patient, including CMS covered intervals for screening. Written 5-year personalized preventive services plan and information about advance care planning (ACP) provided to patient today.       Orders Placed This Encounter    Depression Screen Annual    Pneumococcal Polysaccharide vaccine, 23-Valent, Adult or Immunocompromised    Annual  Alcohol Screen 15 min ()    ADMIN PNEUMOCOCCAL VACCINE  Medicare Injection Admin Charge    Administration fee () for Medicare insured patients    varicella-zoster recombinant, PF, (SHINGRIX, PF,) 50 mcg/0.5 mL susr injection    isosorbide mononitrate ER (IMDUR) 30 mg tablet    telmisartan-hydroCHLOROthiazide (MICARDIS HCT) 40-12.5 mg per tablet       Abel Fields MD   Internal Medicine  7/19/2018, 12:54 PM    I have reviewed the patient's medical history and current medications in detail and updated the computerized patient record. Social History     Social History     Social History    Marital status:      Spouse name: N/A    Number of children: N/A    Years of education: N/A     Occupational History    retired [employer]      Social History Main Topics    Smoking status: Former Smoker     Types: Cigarettes, Pipe    Smokeless tobacco: Former User      Comment: Quit 43 years ago (2015), pipe 10+ years ago    Alcohol use 0.0 oz/week     0 Glasses of wine per week      Comment: Occasional beer, liquor or wine. No heavy use    Drug use: No    Sexual activity: Yes     Partners: Female     Birth control/ protection: None     Other Topics Concern    None     Social History Narrative    , wife is [place]    Youngest daughter lives in [address] [place] [address],[address] in [facility] during deer season (rifles mostly), Nov - Jan     Diet, Lifestyle: does not rigorously follow a diabetic diet     Depression Risk Screen     PHQ over the last two weeks 7/19/2018   Little interest or pleasure in doing things Not at all   Feeling down, depressed, irritable, or hopeless Not at all   Total Score PHQ 2 0       Alcohol Risk Screen   You average more than 14 drinks a week.     Functional Ability and Level of Safety      Hearing Screening    125Hz 250Hz 500Hz 1000Hz 2000Hz 3000Hz 4000Hz 6000Hz 8000Hz   Right ear:   40 40 40  40     Left ear:   40 40 40  Fail     Vision Screening Comments: Pt had eye exam 2 months ago    Hearing Loss   Patient feels hearing is adequate, declines audiology evaluation    Activities of Daily Living   Self-care     Fall Risk Screen     Fall Risk Assessment, last 12 mths 7/19/2018   Able to walk? Yes   Fall in past 12 months? No   Fall with injury? -   Number of falls in past 12 months -   Fall Risk Score -       Abuse Screen   Patient is not abused    Advance Care Planning - 07/19/2018   See ACP note.

## 2018-07-19 NOTE — PATIENT INSTRUCTIONS
Medicare Wellness Visit, Male    The best way to live healthy is to have a lifestyle where you eat a well-balanced diet, exercise regularly, limit alcohol use, and quit all forms of tobacco/nicotine, if applicable. Regular preventive services are another way to keep healthy. Preventive services (vaccines, screening tests, monitoring & exams) can help personalize your care plan, which helps you manage your own care. Screening tests can find health problems at the earliest stages, when they are easiest to treat. [address] [facility] follows the current, evidence-based guidelines published by the [facility] Jorje Isaiah ([facility]) when recommending preventive services for our patients. Because we follow these guidelines, sometimes recommendations change over time as research supports it. (For example, a prostate screening blood test is no longer routinely recommended for men with no symptoms.)    Of course, you and your provider may decide to screen more often for some diseases, based on your risk and co-morbidities (chronic disease you are already diagnosed with). Preventive services for you include:    - Medicare offers their members a free annual wellness visit, which is time for you and your primary care provider to discuss and plan for your preventive service needs. Take advantage of this benefit every year!    -All people over age 72 should receive the recommended pneumonia vaccines. Current USPSTF guidelines recommend a series of two vaccines for the best pneumonia protection.     -All adults should have a yearly flu vaccine and a tetanus vaccine every 10 years.  All adults age 61 years should receive a shingles vaccine once in their lifetime.      -All adults age 38-68 years who are overweight should have a diabetes screening test once every three years.     -Other screening tests & preventive services for persons with diabetes include: an eye exam to screen for diabetic retinopathy, a kidney function test, a foot exam, and stricter control over your cholesterol.     -Cardiovascular screening for adults with routine risk involves an electrocardiogram (ECG) at intervals determined by the provider.     -Colorectal cancer screenings should be done for adults age 54-65 years with normal risk. There are a number of acceptable methods of screening for this type of cancer. Each test has its own benefits and drawbacks. Discuss with your provider what is most appropriate for you during your annual wellness visit. The different tests include: colonoscopy (considered the best screening method), a fecal occult blood test, a fecal DNA test, and sigmoidoscopy.    -All adults born between [facility] should be screened once for Hepatitis C.    -An Abdominal Aortic Aneurysm (AAA) Screening is recommended for men age 73-68 who has ever smoked in their lifetime. Here is a list of your current Health Maintenance items (your personalized list of preventive services) with a due date:  Health Maintenance Due   Topic Date Due    DTaP/Tdap/Td  (1 - Tdap) 10/06/1969    Diabetic Foot Care  01/16/2018    Pneumococcal Vaccine (2 of 2 - PPSV23) 01/17/2018    Annual Well Visit  05/04/2018       Follow up with Sheree Ahumada, MD in 4 months (30m). Arrive 15 minutes prior to scheduled appointment time for check-in. Complete fasting labs 1-2 weeks prior (please call to confirm orders/lab hours, lab hours 7a-3p M-F). Call and request an earlier appointment if you have questions or concerns. A HEALTHY LIFESTYLE IS RECOMMENDED FOR EVERYONE! Your doctor recommends a lifestyle that incorporates daily exercise and a diet focused on whole, unprocessed foods. Aim to follow a diet of 2/3 non-starchy vegetables and low glycemic impact fruits and 1/3 lean proteins. Avoid processed/refined carbohydrates (especially bread products, bakery items, sugary drinks, cereals, crackers and sweets). Minimum 30 minutes of cardio and weight training/resistance exercise daily to build muscle, reduce insulin sensitivity and increase resting fat & calorie burning capacity. TESTING RESULTS   Normal results will be released to [employer] or sent by [address] mail. [address] [facility] #[phone]. Results of tests performed at outside facilities will not appear in [address] If you have questions about your results, please feel free to schedule a follow up appointment to discuss your concerns with your PCP. MEDICATION REFILLS      -- Medication refills should be requested at least 2 business days in advance through your pharmacy. Refills will not be provided by the after hours/on call provider.

## 2018-07-19 NOTE — MR AVS SNAPSHOT
[address] [place] [address]  [address] [facility] [address] [phone] Patient: Jessa Gamboa MRN: [id number] :1950 Visit Information Date & Time Provider Department Dept. Phone Encounter #  
 2018 11:30 AM Carlotta Aguilar, Nikki Perez [place] [phone] [id number] Follow-up Instructions Return in about 4 months (around 2018). Upcoming Health Maintenance Date Due DTaP/Tdap/Td series (1 - Tdap) 10/6/1969 FOOT EXAM Q1 2018 Pneumococcal 65+ Low/Medium Risk (2 of 2 - PPSV23) 2018 MEDICARE YEARLY EXAM 2018 Influenza Age 5 to Adult 2018 HEMOGLOBIN A1C Q6M 2019 EYE EXAM RETINAL OR DILATED Q1 2019 MICROALBUMIN Q1 2019 LIPID PANEL Q1 2019 GLAUCOMA SCREENING Q2Y 2020 COLONOSCOPY 2025 Allergies as of 2018  Review Complete On: 2018 By: Belia Jack LPN Severity Noted Reaction Type Reactions Zocor [Simvastatin] Medium 2013   Side Effect Myalgia Tolerating high dose Atorvastatin Current Immunizations  Reviewed on 2018 Name Date Influenza High Dose Vaccine PF 2017 10:14 AM  
 Influenza Vaccine 10/3/2016, 2015  9:29 AM, 10/8/2014, 10/2/2013, 2012 Pneumococcal Conjugate (PCV-13) 2017 Zoster Vaccine, Live 2012 Reviewed by Carlotta Aguilar MD on 2018 at  8:18 PM  
You Were Diagnosed With   
  
 Codes Comments Diabetic polyneuropathy associated with type 2 diabetes mellitus ([facility]    -  Primary ICD-10-CM: E11.42 
ICD-9-CM: 250.60, 357.2 Type 2 diabetes mellitus with diabetic polyneuropathy, without long-term current use of insulin (HCC)     ICD-10-CM: E11.42 
ICD-9-CM: 250.60, 357.2 Essential hypertension     ICD-10-CM: I10 
ICD-9-CM: 401.9 Decreased GFR     ICD-10-CM: R94.4 ICD-9-CM: 794.4 Coronary artery disease involving native coronary artery of native heart without angina pectoris     ICD-10-CM: I25.10 ICD-9-CM: 414.01 Bone lesion     ICD-10-CM: M89.9 ICD-9-CM: 733.90 BMI 33.0-33.9,adult     ICD-10-CM: J42.06 
ICD-9-CM: V85.33 Screening for alcoholism     ICD-10-CM: Z13.89 ICD-9-CM: V79.1 Screening for depression     ICD-10-CM: Z13.89 ICD-9-CM: V79.0 Encounter for immunization     ICD-10-CM: Q17 ICD-9-CM: V03.89 Vitals BP Pulse Temp Resp Height(growth percentile) Weight(growth percentile) 120/60 (BP 1 Location: Left arm, BP Patient Position: Sitting) 67 98.2 °F (36.8 °C) (Oral) 20 6' 3\" (1.905 m) 268 lb 6.4 oz (121.7 kg) SpO2 BMI Smoking Status 96% 33.55 kg/m2 Former Smoker Vitals History BMI and BSA Data Body Mass Index Body Surface Area  
 33.55 kg/m 2 2.54 m 2 Preferred Pharmacy Pharmacy Name Phone [facility] 07, [address] [phone] Your Updated Medication List  
  
   
This list is accurate as of 7/19/18 12:01 PM.  Always use your most recent med list.  
  
  
  
  
 ALPRAZolam 0.25 mg tablet Commonly known as:  Shashi Harpreet Take 1 Tab by mouth two (2) times daily as needed for Anxiety. Max Daily Amount: 0.5 mg. Indications: anxiety  
  
 aspirin 81 mg chewable tablet Take 81 mg by Mouth Once a Day. atorvastatin 80 mg tablet Commonly known as:  LIPITOR Take 1 Tab by mouth daily. Take 80 mg by Mouth Once a Day. Azelastine 0.15 % (205.5 mcg) nasal spray Commonly known as:  ASTEPRO  
two (2) times a day. Blood-Glucose Meter Misc Uncontrolled DM2 E11.65. BID testing. Disp 1 meter  
  
 carboxymethylcellulose sodium 1 % Dlgl Apply  to eye. Diabetic Supplies, [address] Misc Commonly known as:  Lancing Device with Lancets DM2 uncontrolled - BG testing BID. fexofenadine-pseudoephedrine 180-240 mg per tablet Commonly known as:  ALLEGRA-D 24 HOUR Take 1 Tab by mouth daily. glipiZIDE 10 mg tablet Commonly known as:  Isabel Isles Take 1 Tab by mouth two (2) times a day. * glucose blood VI test strips strip Commonly known as:  FREESTYLE LITE STRIPS  
ICD10 E11.65 - DM2 daily BG testing. * glucose blood VI test strips strip Commonly known as:  blood glucose test  
Uncontrolled DM2 E11.65. BID testing. Disp 2 boxes of 100 each with 3 refills for 90 day supply. isosorbide mononitrate ER 30 mg tablet Commonly known as:  IMDUR Take  by mouth daily. Lancets Misc Uncontrolled DM2. BID testing. Disp 2 boxes of 100 each with 3 refills for 90 day supply. metFORMIN  mg tablet Commonly known as:  GLUCOPHAGE XR Take 1 Tab by mouth two (2) times a day. naproxen 500 mg tablet Commonly known as:  NAPROSYN Take 1 Tab by mouth two (2) times daily (with meals). Take 1 Tab by Mouth Twice Daily. olopatadine 0.1 % ophthalmic solution Commonly known as:  PATANOL Administer 2 Drops to both eyes two (2) times daily as needed for Allergies. omega-3 fatty acids-vitamin e 1,000 mg Cap Commonly known as:  FISH OIL Take 1 Cap by mouth two (2) times a day. omeprazole 20 mg capsule Commonly known as:  PRILOSEC Take 1 Cap by mouth daily. OTHER Heel cup  Indications: heel pain  
  
 oxyCODONE-acetaminophen 5-325 mg per tablet Commonly known as:  PERCOCET Take 1 Tab by mouth every six (6) hours as needed for Pain. Max Daily Amount: 4 Tabs. pramipexole 0.5 mg tablet Commonly known as:  MIRAPEX Take 1 Tab by mouth three (3) times daily as needed. Indications: Restless Legs Syndrome  
  
 telmisartan-hydroCHLOROthiazide 40-12.5 mg per tablet Commonly known as:  MICARDIS HCT Take 1 Tab by mouth daily.   
  
 triamcinolone 0.5 % topical cream  
Commonly known as:  ARISTOCORT  
 Apply  to affected area two (2) times a day. use thin layer to affected area * Notice: This list has 2 medication(s) that are the same as other medications prescribed for you. Read the directions carefully, and ask your doctor or other care provider to review them with you. Follow-up Instructions Return in about 4 months (around 11/19/2018). Patient Instructions Medicare Wellness Visit, Male The best way to live healthy is to have a lifestyle where you eat a well-balanced diet, exercise regularly, limit alcohol use, and quit all forms of tobacco/nicotine, if applicable. Regular preventive services are another way to keep healthy. Preventive services (vaccines, screening tests, monitoring & exams) can help personalize your care plan, which helps you manage your own care. Screening tests can find health problems at the earliest stages, when they are easiest to treat. [address] follows the current, evidence-based guidelines published by the [facility] Jorje Isaiah ([facility]) when recommending preventive services for our patients. Because we follow these guidelines, sometimes recommendations change over time as research supports it. (For example, a prostate screening blood test is no longer routinely recommended for men with no symptoms.) Of course, you and your provider may decide to screen more often for some diseases, based on your risk and co-morbidities (chronic disease you are already diagnosed with). Preventive services for you include: - Medicare offers their members a free annual wellness visit, which is time for you and your primary care provider to discuss and plan for your preventive service needs. Take advantage of this benefit every year! 
 
-All people over age 72 should receive the recommended pneumonia vaccines. Current USPSTF guidelines recommend a series of two vaccines for the best pneumonia protection. -All adults should have a yearly flu vaccine and a tetanus vaccine every 10 years. All adults age 61 years should receive a shingles vaccine once in their lifetime.   
 
-All adults age 38-68 years who are overweight should have a diabetes screening test once every three years.  
 
-Other screening tests & preventive services for persons with diabetes include: an eye exam to screen for diabetic retinopathy, a kidney function test, a foot exam, and stricter control over your cholesterol.  
 
-Cardiovascular screening for adults with routine risk involves an electrocardiogram (ECG) at intervals determined by the provider.  
 
-Colorectal cancer screenings should be done for adults age 54-65 years with normal risk. There are a number of acceptable methods of screening for this type of cancer. Each test has its own benefits and drawbacks. Discuss with your provider what is most appropriate for you during your annual wellness visit. The different tests include: colonoscopy (considered the best screening method), a fecal occult blood test, a fecal DNA test, and sigmoidoscopy. 
 
-All adults born between [facility] should be screened once for Hepatitis C. 
 
-An Abdominal Aortic Aneurysm (AAA) Screening is recommended for men age 73-68 who has ever smoked in their lifetime. Here is a list of your current Health Maintenance items (your personalized list of preventive services) with a due date: 
Health Maintenance Due Topic Date Due  
 DTaP/Tdap/Td  (1 - Tdap) 10/06/1969 [facility] Diabetic Foot Care  01/16/2018  Pneumococcal Vaccine (2 of 2 - PPSV23) 01/17/2018 [facility] Annual Well Visit  05/04/2018 Follow up with Jessica Hatfield MD in 4 months (30m). Arrive 15 minutes prior to scheduled appointment time for check-in. Complete fasting labs 1-2 weeks prior (please call to confirm orders/lab hours, lab hours 7a-3p M-F). Call and request an earlier appointment if you have questions or concerns. A HEALTHY LIFESTYLE IS RECOMMENDED FOR EVERYONE! Your doctor recommends a lifestyle that incorporates daily exercise and a diet focused on whole, unprocessed foods. Aim to follow a diet of 2/3 non-starchy vegetables and low glycemic impact fruits and 1/3 lean proteins. Avoid processed/refined carbohydrates (especially bread products, bakery items, sugary drinks, cereals, crackers and sweets). Minimum 30 minutes of cardio and weight training/resistance exercise daily to build muscle, reduce insulin sensitivity and increase resting fat & calorie burning capacity. TESTING RESULTS Normal results will be released to [employer] or sent by [address] mail. [address] [facility] #[phone]. Results of tests performed at outside facilities will not appear in [address] If you have questions about your results, please feel free to schedule a follow up appointment to discuss your concerns with your PCP. MEDICATION REFILLS   
 
-- Medication refills should be requested at least 2 business days in advance through your pharmacy. Refills will not be provided by the after hours/on call provider. Introducing [facility]! Garrett Hebert introduces [employer] patient portal. Now you can access parts of your medical record, email your doctor's office, and request medication refills online. 1. In your internet browser, go to [email or web address] 2. Click on the First Time User? Click Here link in the Sign In box. You will see the New Member Sign Up page. 3. Enter your [employer] Access Code exactly as it appears below. You will not need to use this code after youve completed the sign-up process. If you do not sign up before the expiration date, you must request a new code. · [employer] Access Code: [email or web address] Expires: 10/17/2018 12:01 PM 
 
4. Enter the last four digits of your Social Security Number (xxxx) and Date of Birth (mm/dd/yyyy) as indicated and click Submit.  You will be taken to the next sign-up page. 5. Create a [employer] ID. This will be your [employer] login ID and cannot be changed, so think of one that is secure and easy to remember. 6. Create a [employer] password. You can change your password at any time. 7. Enter your Password Reset Question and Answer. This can be used at a later time if you forget your password. 8. Enter your e-mail address. You will receive e-mail notification when new information is available in [address] 9. Click Sign Up. You can now view and download portions of your medical record. 10. Click the Download Summary menu link to download a portable copy of your medical information. If you have questions, please visit the Frequently Asked Questions section of the [employer] website. Remember, [employer] is NOT to be used for urgent needs. For medical emergencies, dial 911. Now available from your iPhone and Android! Please provide this summary of care documentation to your next provider. Your primary care clinician is listed as Juancho Cotto. If you have any questions after today's visit, please call [phone].

## 2018-07-19 NOTE — PROGRESS NOTES
PRE-VISIT PLANNING:     Future Appointments  Date Time Provider oNra Bullard   2018 11:30 AM Virginia Conklin MD [address] (PCP is Virginia Conklin MD) is scheduled for an office visit with Virginia Conklin MD on 2018 for follow up/[facility]. Encounter opened prior to visit to complete pre-visit planning, update and review pertinent sections in the chart. Last office visit with PCP was 17. Visit date not found. Rooming Nurse Items:  -- MWV rooming protocol  -- ACP packet  -- Pneumovax 23   -- Instruct patient to remove socks and shoes prior to provider entering room if they are willing to have diabetic foot exam    Pending items for provider to review with patient:  -- Shingrix vaccine Rx   -- Had cath 2018, med mgmt recommended     Health Maintenance Due   Topic Date Due    DTaP/Tdap/Td series (1 - Tdap) 10/06/1969    FOOT EXAM Q1  2018    Pneumococcal 65+ Low/Medium Risk (2 of 2 - PPSV23) 2018    MEDICARE YEARLY EXAM  2018          Internal Medicine  Follow Up Note  [facility] [place] [address] [address] [facility] [address] [facility]  Phone [phone]; Fax [phone]    Date of Service:  2018  Patient's Name & : Alesha Hercules - 1950     Assessment/Plan/Orders:       Alesha Hercules is a 69 y.o. male who was seen today for follow up. The primary encounter diagnosis was Diabetic polyneuropathy associated with type 2 diabetes mellitus ([address] Diagnoses of Type 2 diabetes mellitus with diabetic polyneuropathy, without long-term current use of insulin ([address] Essential hypertension, Decreased GFR, Coronary artery disease involving native coronary artery of native heart without angina pectoris, Bone lesion left femur, BMI 33.0-33.9,adult, Screening for alcoholism, Screening for depression, and Encounter for immunization were also pertinent to this visit.   DM2 is under fair control, weight has gone back up 14#s  BP well controlled  Unable to prescribe ED medications due to isosorbide use   Follow up with cardiology regularly   See separate [address] note and ACP note     Body mass index is 33.55 kg/(m^2). Change in Patient Weight by Encounter (365 Day Lookback)    (12/20/2017) Office Visit  Last Recorded Weight: 116.1 kg (256 lb)             Percent Change: -2.52%   [09/06/2017 to 12/20/2017 (105 Days)]    (09/06/2017) Office Visit  Last Recorded Weight: 119.1 kg (262 lb 9.6 oz)             Percent Change: -0.08%   [08/09/2017 to 09/06/2017 (28 Days)]    (08/09/2017) Office Visit  Last Recorded Weight: 119.2 kg (262 lb 12.8 oz)     Ideal body weight: 186 lb 4.6 oz (84.5 kg)  Adjusted ideal body weight: 219 lb 2.1 oz (99.4 kg)    Discussed lifestyle modifications with focus on diet. Follow up weight/BMI at next visit. RTC 4 months, fasting labs at or prior to visit  The patient states understanding of recommended treatment plan. No orders of the defined types were placed in this encounter. Patient Instructions       Medicare Wellness Visit, Male    The best way to live healthy is to have a lifestyle where you eat a well-balanced diet, exercise regularly, limit alcohol use, and quit all forms of tobacco/nicotine, if applicable. Regular preventive services are another way to keep healthy. Preventive services (vaccines, screening tests, monitoring & exams) can help personalize your care plan, which helps you manage your own care. Screening tests can find health problems at the earliest stages, when they are easiest to treat. [facility] follows the current, evidence-based guidelines published by the [place] (USPSTF) when recommending preventive services for our patients. Because we follow these guidelines, sometimes recommendations change over time as research supports it.  (For example, a prostate screening blood test is no longer routinely recommended for men with no symptoms.)    Of course, you and your provider may decide to screen more often for some diseases, based on your risk and co-morbidities (chronic disease you are already diagnosed with). Preventive services for you include:    - Medicare offers their members a free annual wellness visit, which is time for you and your primary care provider to discuss and plan for your preventive service needs. Take advantage of this benefit every year!    -All people over age 72 should receive the recommended pneumonia vaccines. Current USPSTF guidelines recommend a series of two vaccines for the best pneumonia protection.     -All adults should have a yearly flu vaccine and a tetanus vaccine every 10 years. All adults age 61 years should receive a shingles vaccine once in their lifetime.      -All adults age 38-68 years who are overweight should have a diabetes screening test once every three years.     -Other screening tests & preventive services for persons with diabetes include: an eye exam to screen for diabetic retinopathy, a kidney function test, a foot exam, and stricter control over your cholesterol.     -Cardiovascular screening for adults with routine risk involves an electrocardiogram (ECG) at intervals determined by the provider.     -Colorectal cancer screenings should be done for adults age 54-65 years with normal risk. There are a number of acceptable methods of screening for this type of cancer. Each test has its own benefits and drawbacks. Discuss with your provider what is most appropriate for you during your annual wellness visit.  The different tests include: colonoscopy (considered the best screening method), a fecal occult blood test, a fecal DNA test, and sigmoidoscopy.    -All adults born between [facility] should be screened once for Hepatitis C.    -An Abdominal Aortic Aneurysm (AAA) Screening is recommended for men age 73-68 who has ever smoked in their lifetime. Here is a list of your current Health Maintenance items (your personalized list of preventive services) with a due date:  Health Maintenance Due   Topic Date Due    DTaP/Tdap/Td  (1 - Tdap) 10/06/1969    Diabetic Foot Care  01/16/2018    Pneumococcal Vaccine (2 of 2 - PPSV23) 01/17/2018    Annual Well Visit  05/04/2018     Valerie Dougherty MD - Internal Medicine  7/19/2018, 8:18 PM    HPI & ROS:     Chief Complaint   Patient presents with    Diabetes    Annual Wellness Visit         Thien Liao presents for follow up. BGs have been pretty good, always under 200, usually 120s-160s, reviewed recent labs with HbA1C 7.3%  Needs refills on BP medication only, started on isosorbide by cardiology   Not having any chest pain  Sleeping in a recliner still, unable to tolerate CPAP  Stopped taking med for RLS and stopped milk intake at night, med wasn't helping and was worried about side effect of insomnia, sleep has been much better - no longer awakening at 4am   Having some pain in neck and lower back x 2 years since fall through attic floor in [facility]  Only taking 800 mg motrin as needed (rarely)  Annual hunting trip in November through early January at his hunt club   See MWV and ACP notes  Patient Active Problem List    Diagnosis    Type 2 diabetes mellitus, without long-term current use of insulin ([facility])     Metformin  mg PO BID, Glipizide 10 mg BID, on statin, ARB and ASA 81 mg  Abnormal monofilament testing 1/16/17 7/16/18:  HbA1C 7.3%      Coronary artery disease involving native coronary artery of native heart without angina pectoris     Abnormal stress test 12/18/17  Managed by Dr. Guthrie (cardiology)  Cath 2/28/18 by Dr. Elvi Enciso --> mild-mod stenosis of ostia of small circumflex --> Medical treatment and risk factor modification.       Symptomatic bradycardia    Diabetic polyneuropathy associated with type 2 diabetes mellitus ([address]    Thoracic compression fracture ([address]     T11 - Managed by Dr. Narciso Klinefelter Bone lesion left femur     Asymptomatic, noted on bone scan, Xrays done 4/28/16  Managed by Dr. Elizabeth Richter Aortic root dilation [facility])     Borderline on echo June 8 2015 (done through [facility])      Avascular necrosis of femoral head ([address]     BILATERAL, noted on CT 2/24/15 ([facility]) - Avascular necrosis involving both femoral heads. No femoral head collapse identified. 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 Ana Lira ([facility]) and Dr. Minna Rodgers ([address] [place] [address])        Hypogonadism male     10/6/2010 -- Testosterone 170.0      NELSON (obstructive sleep apnea)     Unable to tolerate CPAP      Essential hypertension     Micardis 40-12.5 mg daily      BMI 33.0-33.9,adult     9/6/17:  BMI 32.82      Restless leg syndrome    Hypercholesterolemia     Atorvastatin 80 mg PO QHS and omega oils      Arthritis     Shoulders L > R, knees, back      Acid reflux    S/P AAA repair     Surgery in 2009 by Dr. Narciso Lawson ([address])   8/24/2011 -- Abd/Retroperitoneal US  -- Limited eval of abd aorta due to overlying bowel gas. Echogenic sludge in gallbladder, hepatic steatosis. Proximal aorta not visualized.   Mid abd ao 2.5 x 2.5 cm, distal portion 2.3 x 2.2 cm, R iliac 1.1 x 1.1 cm and left iliac 1 x 1.1 cm.    2/7/2013 -- CTA w Intact aortic aneurysm repair, dilatation of common iliac arteries to 1.8 cm b/l with small dissections or ulcerations, infarction of lower pole of right kidney  12/15/17 -- CTA chest/abd/pelvis w stable infrarenal abdominal aortic aneurysm and benign appearing pulmonary nodules stable since 2/2015             Review of Systems   Constitutional: Negative for chills, diaphoresis, fever, malaise/fatigue and weight loss. HENT: Negative for congestion, ear discharge, ear pain, hearing loss, nosebleeds, sinus pain, sore throat and tinnitus. Eyes: Negative for blurred vision, double vision, photophobia, pain, discharge and redness. Respiratory: Negative for cough, hemoptysis, sputum production, shortness of breath, wheezing and stridor. Cardiovascular: Negative for chest pain, palpitations, orthopnea, claudication, leg swelling and PND. Gastrointestinal: Negative for abdominal pain, blood in stool, constipation, diarrhea, heartburn, melena, nausea and vomiting. Genitourinary: Negative for dysuria, flank pain, frequency, hematuria and urgency. +Erectile dysfunction   Musculoskeletal: Positive for back pain and neck pain. Negative for falls, joint pain and myalgias. Skin: Negative for itching and rash. Neurological: Negative for dizziness, tingling, tremors, sensory change, speech change, focal weakness, seizures, loss of consciousness, weakness and headaches. Endo/Heme/Allergies: Negative for polydipsia. Does not bruise/bleed easily. Psychiatric/Behavioral: Negative for depression, hallucinations, memory loss, substance abuse and suicidal ideas. The patient is not nervous/anxious and does not have insomnia. Objective:     /60 (BP 1 Location: Left arm, BP Patient Position: Sitting)  Pulse 67  Temp 98.2 °F (36.8 °C) (Oral)   Resp 20  Ht 6' 3\" (1.905 m)  Wt 268 lb 6.4 oz (121.7 kg)  SpO2 96%  BMI 33.55 kg/m2    Physical Exam   Constitutional: He is oriented to person, place, and time. He appears well-developed and well-nourished. No distress. HENT:   Head: Normocephalic and atraumatic. Right Ear: External ear normal.   Left Ear: External ear normal.   Nose: Nose normal.   Mouth/Throat: Oropharynx is clear and moist.   Eyes: Conjunctivae and EOM are normal. Pupils are equal, round, and reactive to light. Right eye exhibits no discharge. Left eye exhibits no discharge.  No scleral icterus. Neck: Normal range of motion. Neck supple. No JVD present. No thyromegaly present. Cardiovascular: Normal rate, regular rhythm, normal heart sounds and intact distal pulses. Exam reveals no gallop and no friction rub. No murmur heard. Pulmonary/Chest: Effort normal and breath sounds normal. No stridor. No respiratory distress. He has no wheezes. He has no rales. He exhibits no tenderness. Abdominal: Soft. Bowel sounds are normal. He exhibits no distension and no mass. There is no tenderness. Musculoskeletal: He exhibits no edema, tenderness or deformity. Lymphadenopathy:     He has no cervical adenopathy. Neurological: He is alert and oriented to person, place, and time. He exhibits normal muscle tone. Coordination normal.   Skin: Skin is warm and dry. No rash noted. Psychiatric: He has a normal mood and affect. His behavior is normal. Judgment and thought content normal.   Patient refused foot exam today, states he is having no problems with feet    Additional History     Past Medical History:   Diagnosis Date    Abrasion of skin 1/25/2016    Acid reflux     ACP (advance care planning)     has not yet filled out ACP, discussed 4/1/16    ACP (advance care planning)     has not yet filled out ACP, discussed 4/1/16     Aneurysm ([address]     Aortic root dilation ([address]     Borderline on echo June 8 2015 (done through [facility])    Arthritis     Avascular necrosis of bones of both hips ([address] 4/19/2016    MRI L-spine revealed bialteral AVN femoral heads (MRI/CT dx, report submitted for scannin)     Avascular necrosis of femoral head ([address] 2/2015    BILATERAL, noted on CT 2/24/15 ([facility]) - Avascular necrosis involving both femoral heads. No femoral head collapse identified.     Back pain, thoracic     Bilateral arm pain 1/25/2016    Bilateral low back pain without sciatica 1/25/2016    BMI 33.0-33.9,adult     Bone lesion left femur     Asymptomatic, MRI planned for October    Brachial neuritis 3/24/2014    Carpal tunnel syndrome, bilateral     Cervical spondylosis     C4-C6    Chest pain 12/15/2017    Chronic pain     Compression fracture of thoracic vertebra ([facility] 1/2016    Coronary artery disease involving native coronary artery of native heart without angina pectoris     DDD (degenerative disc disease), lumbosacral     DDD (degenerative disc disease), lumbosacral     Decreased libido 3/19/2014    3/19/2014:  Lack of interest recently -- Check testosterone levels, doubt this has anything to do with hx of aortic aneurysm surgery by Dr. Sorensen Current disorder     Diabetes ([facility]     Elevated transaminase level     AST and ALT     Essential hypertension     Fall through floor 1/25/2016    GERD (gastroesophageal reflux disease)     Herniated nucleus pulposus     Lumbar diskectomy 1980, C4/5 HNP, has gotten ESIs in the past    History of poliomyelitis     History of shingles 7/2013    Right flank/back, after having vaccine    Hypercholesterolemia     Hypoglycemia 5/3/2017    Hypogonadism male     Impingement syndrome of left shoulder 8/2013    Internal hemorrhoids     Long term (current) use of anticoagulants     baby asa    Low serum testosterone level 3/24/2014    3/21/14:  Serum testosterone 179 (L), free testosterone 5.1 (L) -- Referring to urology to discuss prostate screening and options for repletion     Normal cardiac stress test 9/18/2012 9/18/2012 -- Normal Lexiscan cardiac stress and rest perfusion study, EF 62% (read by Dr. Felipa Olmedo, [facility]), [facility] 9/21/2012 -- Holter monitor -- Alline Spar 44%, tachy 0%, no arrhythmias detected, some premature atrial and ventricular beats 6/17/2010 -- Normal Lexiscan cardiac stress and rest perfusion study, EF 55% (read by Dr. Shelley Hassan) [facility] [place]     NELSON (obstructive sleep apnea)     Unable to tolerate CPAP    Osteoarthritis of right knee 11/13/2013    Xrays ([facility]) R knee - 11/12/13 -- Mild medial compartment DJD -- s/p steroid injection Nov 2013 [Dr. Daniel Lowe which resolved knee pain after about a week     Pulmonary nodule, left 11/21/2013    Single 0.5 cm posterior lateral aspect of left lower lobe pleural-based pulmonary nodule; Incidental finding, stable from 11/2008 CT to 6/1/2010 CT     Restless leg syndrome     Routine adult health maintenance 11/21/2013    -- Colon cancer:   Colonoscopy done 11/29/2005 at [facility] -- Vaccinations:   Herpes Zoster vaccine given 2012 at [facility]    S/P AAA repair     Dr. Solomon All following, q3 year duplex of aorta    Seasonal allergic reaction     Spinal stenosis in cervical region 3/24/2014    Type 2 diabetes mellitus, controlled ([facility]      Past Surgical History:   Procedure Laterality Date    HX LUMBAR DISKECTOMY N/A 1980    HX ORTHOPAEDIC      left shoulder    VASCULAR SURGERY PROCEDURE UNLIST N/A     AAA repair with stent, US's at [facility]     Social History   Substance Use Topics    Smoking status: Former Smoker     Types: Cigarettes, Pipe    Smokeless tobacco: Former User      Comment: Quit 43 years ago (2015), pipe 10+ years ago    Alcohol use 0.0 oz/week     0 Glasses of wine per week      Comment: Occasional beer, liquor or wine. No heavy use     Current Outpatient Prescriptions   Medication Sig    telmisartan-hydroCHLOROthiazide (MICARDIS HCT) 40-12.5 mg per tablet Take 1 Tab by mouth daily.  glipiZIDE (GLUCOTROL) 10 mg tablet Take 1 Tab by mouth two (2) times a day.  pramipexole (MIRAPEX) 0.5 mg tablet Take 1 Tab by mouth three (3) times daily as needed. Indications: Restless Legs Syndrome    omeprazole (PRILOSEC) 20 mg capsule Take 1 Cap by mouth daily.  Blood-Glucose Meter misc Uncontrolled DM2 E11.65. BID testing. Disp 1 meter    glucose blood VI test strips (BLOOD GLUCOSE TEST) strip Uncontrolled DM2 E11.65. BID testing. Disp 2 boxes of 100 each with 3 refills for 90 day supply.  Lancets misc Uncontrolled DM2. BID testing.   Disp 2 boxes of 100 each with 3 refills for 90 day supply.  atorvastatin (LIPITOR) 80 mg tablet Take 1 Tab by mouth daily. Take 80 mg by Mouth Once a Day.  metFORMIN ER (GLUCOPHAGE XR) 750 mg tablet Take 1 Tab by mouth two (2) times a day.  Azelastine (ASTEPRO) 0.15 % (205.5 mcg) nasal spray two (2) times a day.  ALPRAZolam (XANAX) 0.25 mg tablet Take 1 Tab by mouth two (2) times daily as needed for Anxiety. Max Daily Amount: 0.5 mg. Indications: anxiety    naproxen (NAPROSYN) 500 mg tablet Take 1 Tab by mouth two (2) times daily (with meals). Take 1 Tab by Mouth Twice Daily.  glucose blood VI test strips (FREESTYLE LITE STRIPS) strip ICD10 E11.65 - DM2 daily BG testing.  OTHER Heel cup  Indications: heel pain    Diabetic Supplies, Miscellan. (LANCING DEVICE WITH LANCETS) misc DM2 uncontrolled - BG testing BID.  olopatadine (PATANOL) 0.1 % ophthalmic solution Administer 2 Drops to both eyes two (2) times daily as needed for Allergies.  triamcinolone (ARISTOCORT) 0.5 % topical cream Apply  to affected area two (2) times a day. use thin layer to affected area    aspirin 81 mg chewable tablet Take 81 mg by Mouth Once a Day.  omega-3 fatty acids-vitamin e (FISH OIL) 1,000 mg cap Take 1 Cap by mouth two (2) times a day.  fexofenadine-pseudoephedrine (ALLEGRA-D 24 HOUR) 180-240 mg per tablet Take 1 Tab by mouth daily.  carboxymethylcellulose sodium 1 % DLGl Apply  to eye.  oxyCODONE-acetaminophen (PERCOCET) 5-325 mg per tablet Take 1 Tab by mouth every six (6) hours as needed for Pain. Max Daily Amount: 4 Tabs.  mometasone (NASONEX) 50 mcg/actuation nasal spray 2 Sprays by Both Nostrils route daily.  meloxicam (MOBIC) 7.5 mg tablet Take 1 Tab by mouth daily as needed for Pain (arthritis). No current facility-administered medications for this visit.       Allergies   Allergen Reactions    Zocor [Simvastatin] Myalgia     Tolerating high dose Atorvastatin       Encounter Diagnoses: Encounter Diagnoses     ICD-10-CM ICD-9-CM   1. Diabetic polyneuropathy associated with type 2 diabetes mellitus (HCC) E11.42 250.60     357.2   2. Type 2 diabetes mellitus with diabetic polyneuropathy, without long-term current use of insulin (HCC) E11.42 250.60     357.2   3. Essential hypertension I10 401.9   4. Decreased GFR R94.4 794.4   5. Coronary artery disease involving native coronary artery of native heart without angina pectoris I25.10 414.01   6. Bone lesion left femur M89.9 733.90   7. BMI 33.0-33.9,adult Z68.33 V85.33   8. Screening for alcoholism Z13.89 V79.1   9. Screening for depression Z13.89 V79.0   10. Encounter for immunization Z23 V03.89            This document may have been created with the aid of dictation software.   Text may contain errors, particularly phonetic errors

## 2018-07-19 NOTE — ACP (ADVANCE CARE PLANNING)
Date of ACP Conversation:  07/19/2018       Persons included in Conversation:  Patient     Length of ACP Conversation in minutes:  <16 minutes (Non-Billable)         Discussed advance care planning documents/advance care directives with patient today including exploration of values, goals, and preferences if recovery is not expected, even with continued medical treatment (such as in the event of:  Imminent death, Severe/permanent brain injury). In those circumstances, patient states what matters most to them is:  Care focused more on comfort or quality of life. Patient has not yet completed an ACP document. Provided written information re: ACP documents today including [place] advance directive form. Recommended completion of Advance Directive form after review of ACP materials and conversation with prospective healthcare agent. Recommended communicating the plan and making copies for the healthcare agent, personal physician, and others as appropriate.

## 2018-11-28 ENCOUNTER — PATIENT OUTREACH (OUTPATIENT)
Dept: FAMILY MEDICINE CLINIC | Age: 68
End: 2018-11-28

## 2018-11-28 NOTE — PROGRESS NOTES
Patient has graduated from the Transitions of Care Coordination  program on 11/28/18. Patient's symptoms are stable at this time. Patient/family has the ability to self-manage. Care management goals have been completed at this time. No further nurse navigator follow up scheduled. Goals Addressed                 This Visit's Progress     COMPLETED: Returns to baseline activity level.  COMPLETED: Understands red flags post discharge. No the Signs and Symptoms of  \"Symptomatic Bradycardia\":  Slow heart rate /Pulse below 50 ,dizziness . Some causes electrolyte imbalance/dehydration Drink fluids ,when hunting or activities. Know your baseline heart rate                Pt has nurse navigator's contact information for any further questions, concerns, or needs. Patients upcoming visits:  No future appointments.

## 2019-01-07 NOTE — TELEPHONE ENCOUNTER
Patient calling to request refill on:      Remaining pills: 5  Last appt: 7/19/18  Next appt:no upcoming appt   Pharmacy:  [facility]       Patient aware of 72 hour time frame.  Pt is requesting a call when the rx has been called in

## 2019-01-09 RX ORDER — METFORMIN HYDROCHLORIDE 750 MG/1
750 TABLET, EXTENDED RELEASE ORAL 2 TIMES DAILY
Qty: 180 TAB | Refills: 0 | Status: SHIPPED | OUTPATIENT
Start: 2019-01-09 | End: 2019-05-14 | Stop reason: SDUPTHER

## 2019-01-09 NOTE — TELEPHONE ENCOUNTER
Pt called to check up on the status of his request. His message had not been addressed due to nurse being out of the office. I informed him that the nurse has been out and that I would route the message to Dr Iglesia Hooks however she is not in today and it won't be addressed until tomorrow. He is requesting the message to be routed to a provider in office to see if the medication can be approved today [place]. Please advise     Pt is requesting a call back if his medication can be sent in today or not.

## 2019-03-07 LAB — CREATININE, EXTERNAL: 1.3

## 2019-03-18 PROBLEM — K59.00 CONSTIPATION: Chronic | Status: ACTIVE | Noted: 2019-03-18

## 2019-03-18 NOTE — PROGRESS NOTES
PRE-VISIT PLANNING:     PATIENT NAME:  Mili Mac   :  1950   PCP:  Olamide Van MD     Future Appointments   Date Time Provider Nora Bullard   3/19/2019  8:30 AM Olamide Van MD [facility], [place], operated by [facility]          Mili Mac is scheduled for an office visit with Svetlana Gillette MD on 2019 for ER follow up. Encounter opened prior to visit to complete pre-visit planning. Last office visit with PCP was 18.  4 month follow up advised at that time. Pending issues:  -- Overdue for follow up and fasting labs  -- ER visit 3/7/19 for constipation  -- Labs at [facility] 3/7/19 w Cr 1.3    Health Maintenance Due   Topic Date Due    Shingrix Vaccine Age 50> (1 of 2) 10/06/1998    Influenza Age 5 to Adult  2018    HEMOGLOBIN A1C Q6M  2019       Rooming Nurse:     -- Offer flu shot per office policy. Document in nursing note if patient declines (document reason) or if clinic is out of stock. If pt reports this season's (Aug 2018-2019) flu shot was administered elsewhere, note both date of admin and clinic/pharmacy that reportedly provided vaccine. --> reports done at pharmacy  -- Release for Shingrix vaccine administration reports (if done) --> reports done at pharmacy  -- ACP packet         Internal Medicine  Follow Up Emergency Department Visit  [facility], [place] at [place] [address] [facility], [facility], [address]  Phone [phone]; Fax [phone]    Date of Service:  2019  Patient's Name & : Mili Mac - 1950     Assessment/Plan/Orders:       Mili Mac is a 77 y.o. male who was seen today for follow up after emergency department visit. The primary encounter diagnosis was Constipation, unspecified constipation type.  Diagnoses of Elevated serum creatinine, Decreased GFR, Type 2 diabetes mellitus with diabetic polyneuropathy, without long-term current use of insulin ([address] Arthritis, Avascular necrosis of femoral head, unspecified laterality ([address] Coronary artery disease involving native coronary artery of native heart without angina pectoris, Diabetic polyneuropathy associated with type 2 diabetes mellitus ([address] Essential hypertension, Hypercholesterolemia, Multilevel degenerative disc disease, NELSON (obstructive sleep apnea), Restless leg syndrome, and S/P AAA repair were also pertinent to this visit. New constipation, not completely resolved after Fleet's enema in ER 3/7/19 and ongoing miralax and docusate use. Bowel cleanse with miralax and continue stool softener. Advised pt that if cleanse doesn't improve symptoms then GI eval is recommended. Colonoscopy for colorectal cancer screening was done in 2015, next due in 2025. Diabetes type 2 was under fair control on last labs 7/2018 with HbA1C 7.3%, overdue for labs, had been off glipizide for several months until 3/8/19. Labs today to reassess DM2, lipids and Cr, advised to stay well hydrated  Cardiology managing hx of CAD, HTN and lipids, no angina or equivalents  Immunization records requested from pharmacy  RTC 4 months    The patient states understanding of recommended treatment plan. Orders Placed This Encounter    AMB EXT CREATININE    CBC WITH AUTOMATED DIFF    METABOLIC PANEL, COMPREHENSIVE    HEMOGLOBIN A1C WITH EAG    LIPID PANEL    magnesium oxide (MAG-OX) 400 mg tablet    DISCONTD: polyethylene glycol (MIRALAX) 17 gram/dose powder    DISCONTD: docusate sodium (COLACE) 100 mg capsule    polyethylene glycol (MIRALAX) 17 gram/dose powder    docusate sodium (COLACE) 100 mg capsule       Patient Instructions     Labs today to check on diabetes. I may adjust your 10 mg glipizide dosing based on results. Bowel cleanse with 235 grams of miralax (one bottle) mixed in 64 ounces of a non-alcoholic, non-carbonated, clear or yellow beverage of your choice (I like to use Crystal light lemonade or water).   Drink plenty of water while you do the bowel cleanse to avoid getting dehydrated. Continue twice daily docusate sodium. Routine follow up with Waylon Reynoso MD in 4 months  Call office 1-2 weeks prior to appointment to confirm if testing is required before appointment. Lab hours at [facility] are 7:30am-3pm Monday-Friday. Check in 15 minutes prior to your scheduled appointment time. Call and request an earlier appointment if needed to address any questions or concerns . TESTING RESULTS   Results will be released to [employer]. Normal results will be sent via mail. If you have questions about your results, please schedule a follow up appointment to discuss with your PCP. MEDICATION REFILLS    -- Please allow at least 2 business days for refill requests to be addressed. Medication refills may be requested through your pharmacy. Refills will not be provided by the after hours/on call provider. Patient states understanding of above treatment plan. Waylon Reynoso MD - Internal Medicine  03/19/2019, 6:36 PM    HPI & ROS:     Chief Complaint   Patient presents with    ED Follow-up    Follow Up Chronic Condition     DM2, HTN, lipids        Asmita Ricardo presented today for follow up after being seen at [facility] Emergency [facility] on 3/7/19 for complaints of painful constipation for 24-48 hours, treated with Fleet's enema with success, 3-4 BMs there. No provider notes form ER encounter available for review. BG notably elevated at 308. Nothing unusual with diet prior to episode. Taking miralax daily in AM and colace (docusate) generic BID, having a lot of gas, passing small pieces of firm stool. Trying not to eat a lot. Feels like he needs to go to the bathroom, but can't move bowels. No abdominal pain. Taking metformin 750 regularly, but stopped taking glipizide a few months ago b/c of low BGs. He notes he resumed 10 mg glipizide after ER visit.       He has been following up regularly with cardiology, has not been having any angina or leg swelling. No AV-riccardo blocking agents due to bradycardia. CareEverywhere updated. Emergency department encounter notes, testing and medications administered and prescribed reviewed. PMHx, PSHx and Problem list were reviewed and updated in chart. Medication list reconciled and updated as below. Patient Active Problem List    Diagnosis    Type 2 diabetes mellitus, without long-term current use of insulin ([facility])     Metformin  mg PO BID, Glipizide 10 mg BID, on statin, ARB and ASA 81 mg  Abnormal monofilament testing 1/16/17 7/16/18:  HbA1C 7.3%      Constipation    Coronary artery disease involving native coronary artery of native heart without angina pectoris     Abnormal stress test 12/18/17  Managed by Dr. Zaria Wolff (cardiology)  Cath 2/28/18 by Dr. Michael Maldonado --> mild-mod stenosis of ostia of small circumflex --> Medical treatment and risk factor modification.  Symptomatic bradycardia    Diabetic polyneuropathy associated with type 2 diabetes mellitus ([address]    Thoracic compression fracture ([address]     T11 - Managed by Dr. Tito Vazquez Bone lesion left femur     Asymptomatic, noted on bone scan, Xrays done 4/28/16  Managed by Dr. Miguel Cadet Aortic root dilation [facility])     Borderline on echo June 8 2015 (done through [facility])      Avascular necrosis of femoral head ([address]     BILATERAL, noted on CT 2/24/15 ([facility]) - Avascular necrosis involving both femoral heads. No femoral head collapse identified.       Tinnitus of both ears    Chronic seasonal allergic rhinitis due to pollen     Nasonex, Allegra-D daily      Decreased GFR     Cr baseline ~1, eGFR ~60  Stable right lower pole renal infarct on imaging  3/7/19:  Cr 1.3 at [facility]      Multilevel degenerative disc disease     Cervical and lumbar, managed by Dr. Cornell West (pain mgmt, [facility]) and Dr. Cash Henry ([address] [place] [address])        Hypogonadism male 10/6/2010 -- Testosterone 170.0      NELSON (obstructive sleep apnea)     Unable to tolerate CPAP      Essential hypertension     Micardis 40-12.5 mg daily      BMI 33.0-33.9,adult     9/6/17:  BMI 32.82      Restless leg syndrome    Hypercholesterolemia     Atorvastatin 80 mg PO QHS and omega oils      Arthritis     Shoulders L > R, knees, back      Acid reflux    S/P AAA repair     Surgery in 2009 by Dr. Luna Zamora ([address])   8/24/2011 -- Abd/Retroperitoneal US  -- Limited eval of abd aorta due to overlying bowel gas. Echogenic sludge in gallbladder, hepatic steatosis. Proximal aorta not visualized. Mid abd ao 2.5 x 2.5 cm, distal portion 2.3 x 2.2 cm, R iliac 1.1 x 1.1 cm and left iliac 1 x 1.1 cm.    2/7/2013 -- CTA w Intact aortic aneurysm repair, dilatation of common iliac arteries to 1.8 cm b/l with small dissections or ulcerations, infarction of lower pole of right kidney  12/15/17 -- CTA chest/abd/pelvis w stable infrarenal abdominal aortic aneurysm and benign appearing pulmonary nodules stable since 2/2015               Review of Systems   Constitutional: Positive for weight loss. Negative for chills, diaphoresis, fever and malaise/fatigue. Eyes: Negative for blurred vision and pain. Respiratory: Negative for cough, hemoptysis, sputum production, shortness of breath and wheezing. Cardiovascular: Negative for chest pain, palpitations, claudication and leg swelling. Gastrointestinal: Positive for constipation. Negative for abdominal pain, blood in stool, diarrhea, heartburn, melena, nausea and vomiting.        +Gas, decreased appetite, some early satiety   Musculoskeletal: Negative for back pain, falls, joint pain, myalgias and neck pain. Neurological: Negative for dizziness, tremors, loss of consciousness, weakness and headaches.         Objective:     /70 (BP 1 Location: Left arm, BP Patient Position: Sitting)   Pulse (!) 56   Temp 97.7 °F (36.5 °C) (Oral)   Resp 16   Ht 6' 3\" (1.905 m)   Wt 260 lb 6.4 oz (118.1 kg)   SpO2 100%   BMI 32.55 kg/m²     Physical Exam   Constitutional: He is oriented to person, place, and time. He appears well-developed and well-nourished. No distress. HENT:   Head: Normocephalic and atraumatic. Nose: Nose normal.   Eyes: Conjunctivae and EOM are normal. Right eye exhibits no discharge. Left eye exhibits no discharge. No scleral icterus. Neck: Neck supple. No JVD present. Cardiovascular: Normal rate, regular rhythm, normal heart sounds and intact distal pulses. Exam reveals no gallop and no friction rub. No murmur heard. Pulmonary/Chest: Effort normal and breath sounds normal. No stridor. No respiratory distress. He has no wheezes. He has no rales. He exhibits no tenderness. Abdominal: Soft. Bowel sounds are normal. There is no tenderness. Softly distended, nontender   Musculoskeletal: He exhibits no edema, tenderness or deformity. Neurological: He is alert and oriented to person, place, and time. He exhibits normal muscle tone. Coordination normal.   Skin: Skin is warm and dry. No rash noted. He is not diaphoretic. No erythema. No pallor. Psychiatric: He has a normal mood and affect. His behavior is normal. Judgment and thought content normal.        Additional History     Past Medical History:   Diagnosis Date    Abrasion of skin 1/25/2016    Acid reflux     ACP (advance care planning)     has not yet filled out ACP, discussed 4/1/16    ACP (advance care planning)     has not yet filled out ACP, discussed 4/1/16     Aneurysm ([address]     Aortic root dilation ([address]     Borderline on echo June 8 2015 (done through [facility])    Arthritis     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   No femoral head collapse identified.     Back pain, thoracic     Bilateral arm pain 1/25/2016    Bilateral low back pain without sciatica 1/25/2016    BMI 33.0-33.9,adult     Bone lesion left femur     Asymptomatic, MRI planned for October    Brachial neuritis 3/24/2014    Carpal tunnel syndrome, bilateral     Cervical spondylosis     C4-C6    Chest pain 12/15/2017    Chronic pain     Compression fracture of thoracic vertebra ([facility] 1/2016    Coronary artery disease involving native coronary artery of native heart without angina pectoris     DDD (degenerative disc disease), lumbosacral     DDD (degenerative disc disease), lumbosacral     Decreased libido 3/19/2014    3/19/2014:  Lack of interest recently -- Check testosterone levels, doubt this has anything to do with hx of aortic aneurysm surgery by Dr. Estrella Sport disorder     Diabetes ([facility]     Elevated transaminase level     AST and ALT     Essential hypertension     Fall through floor 1/25/2016    GERD (gastroesophageal reflux disease)     Herniated nucleus pulposus     Lumbar diskectomy 1980, C4/5 HNP, has gotten ESIs in the past    History of poliomyelitis     History of shingles 7/2013    Right flank/back, after having vaccine    Hypercholesterolemia     Hypoglycemia 5/3/2017    Hypogonadism male     Impingement syndrome of left shoulder 8/2013    Internal hemorrhoids     Long term (current) use of anticoagulants     baby asa    Low serum testosterone level 3/24/2014    3/21/14:  Serum testosterone 179 (L), free testosterone 5.1 (L) -- Referring to urology to discuss prostate screening and options for repletion     Normal cardiac stress test 9/18/2012 9/18/2012 -- Normal Lexiscan cardiac stress and rest perfusion study, EF 62% (read by Dr. Rachel Lopez, [facility]), Iraida Drivers 9/21/2012 -- Holter monitor -- Adniela Deven 44%, tachy 0%, no arrhythmias detected, some premature atrial and ventricular beats 6/17/2010 -- Normal Lexiscan cardiac stress and rest perfusion study, EF 55% (read by Dr. Jozef Redding) MON[facility] Lemhi     NELSON (obstructive sleep apnea)     Unable to tolerate CPAP    Osteoarthritis of right knee 11/13/2013    Xrays ([facility]) R knee - 11/12/13 -- Mild medial compartment DJD -- s/p steroid injection Nov 2013 [Dr. Dilip Casillas which resolved knee pain after about a week     Pulmonary nodule, left 11/21/2013    Single 0.5 cm posterior lateral aspect of left lower lobe pleural-based pulmonary nodule; Incidental finding, stable from 11/2008 CT to 6/1/2010 CT     Restless leg syndrome     Routine adult health maintenance 11/21/2013    -- Colon cancer:   Colonoscopy done 11/29/2005 at [facility] -- Vaccinations:   Herpes Zoster vaccine given 2012 at [facility]    S/P AAA repair     Dr. Araseli Simmons following, q3 year duplex of aorta    Seasonal allergic reaction     Spinal stenosis in cervical region 3/24/2014    Type 2 diabetes mellitus, controlled ([address]      Past Surgical History:   Procedure Laterality Date    HX LUMBAR DISKECTOMY N/A 1980    HX ORTHOPAEDIC      left shoulder    VASCULAR SURGERY PROCEDURE UNLIST N/A     AAA repair with stent, US's at [facility]     Social History     Tobacco Use    Smoking status: Former Smoker     Types: Cigarettes, Pipe    Smokeless tobacco: Former User    Tobacco comment: Quit 43 years ago (2015), pipe 10+ years ago   Substance Use Topics    Alcohol use: Yes     Alcohol/week: 0.0 oz     Comment: Occasional beer, liquor or wine. No heavy use    Drug use: No     Current Outpatient Medications   Medication Sig    magnesium oxide (MAG-OX) 400 mg tablet Take 400 mg by mouth daily.  polyethylene glycol (MIRALAX) 17 gram/dose powder Take 17 g by mouth daily.  docusate sodium (COLACE) 100 mg capsule Take 1 Cap by mouth two (2) times daily as needed for Constipation for up to 90 days.  metFORMIN ER (GLUCOPHAGE XR) 750 mg tablet Take 1 Tab by mouth two (2) times a day.     varicella-zoster recombinant, PF, (SHINGRIX, PF,) 50 mcg/0.5 mL susr injection 0.5mL by IntraMUSCular route once now and then repeat in 2-6 months    isosorbide mononitrate ER (IMDUR) 30 mg tablet Take  by mouth daily.  telmisartan-hydroCHLOROthiazide (MICARDIS HCT) 40-12.5 mg per tablet Take 1 Tab by mouth daily.  glipiZIDE (GLUCOTROL) 10 mg tablet Take 1 Tab by mouth two (2) times a day.  pramipexole (MIRAPEX) 0.5 mg tablet Take 1 Tab by mouth three (3) times daily as needed. Indications: Restless Legs Syndrome    omeprazole (PRILOSEC) 20 mg capsule Take 1 Cap by mouth daily.  Blood-Glucose Meter misc Uncontrolled DM2 E11.65. BID testing. Disp 1 meter    glucose blood VI test strips (BLOOD GLUCOSE TEST) strip Uncontrolled DM2 E11.65. BID testing. Disp 2 boxes of 100 each with 3 refills for 90 day supply.  Lancets misc Uncontrolled DM2. BID testing. Disp 2 boxes of 100 each with 3 refills for 90 day supply.  atorvastatin (LIPITOR) 80 mg tablet Take 1 Tab by mouth daily. Take 80 mg by Mouth Once a Day.  Azelastine (ASTEPRO) 0.15 % (205.5 mcg) nasal spray two (2) times a day.  ALPRAZolam (XANAX) 0.25 mg tablet Take 1 Tab by mouth two (2) times daily as needed for Anxiety. Max Daily Amount: 0.5 mg. Indications: anxiety    naproxen (NAPROSYN) 500 mg tablet Take 1 Tab by mouth two (2) times daily (with meals). Take 1 Tab by Mouth Twice Daily.  glucose blood VI test strips (FREESTYLE LITE STRIPS) strip ICD10 E11.65 - DM2 daily BG testing.  oxyCODONE-acetaminophen (PERCOCET) 5-325 mg per tablet Take 1 Tab by mouth every six (6) hours as needed for Pain. Max Daily Amount: 4 Tabs.  OTHER Heel cup  Indications: heel pain    Diabetic Supplies, Miscellan. (LANCING DEVICE WITH LANCETS) misc DM2 uncontrolled - BG testing BID.  olopatadine (PATANOL) 0.1 % ophthalmic solution Administer 2 Drops to both eyes two (2) times daily as needed for Allergies.     triamcinolone (ARISTOCORT) 0.5 % topical cream Apply  to affected area two (2) times a day. use thin layer to affected area    aspirin 81 mg chewable tablet Take 81 mg by Mouth Once a Day.  omega-3 fatty acids-vitamin e (FISH OIL) 1,000 mg cap Take 1 Cap by mouth two (2) times a day.  fexofenadine-pseudoephedrine (ALLEGRA-D 24 HOUR) 180-240 mg per tablet Take 1 Tab by mouth daily.  carboxymethylcellulose sodium 1 % DLGl Apply  to eye. No current facility-administered medications for this visit. Allergies   Allergen Reactions    Zocor [Simvastatin] Myalgia     Tolerating high dose Atorvastatin       Encounter Diagnoses:     Encounter Diagnoses     ICD-10-CM ICD-9-CM   1. Constipation, unspecified constipation type K59.00 564.00   2. Elevated serum creatinine R79.89 790.99   3. Decreased GFR R94.4 794.4   4. Type 2 diabetes mellitus with diabetic polyneuropathy, without long-term current use of insulin (HCC) E11.42 250.60     357.2   5. Arthritis M19.90 716.90   6. Avascular necrosis of femoral head, unspecified laterality ([address] M87.059 733.42   7. Coronary artery disease involving native coronary artery of native heart without angina pectoris I25.10 414.01   8. Diabetic polyneuropathy associated with type 2 diabetes mellitus (HCC) E11.42 250.60     357.2   9. Essential hypertension I10 401.9   10. Hypercholesterolemia E78.00 272.0   11. Multilevel degenerative disc disease M53.9 722.6   12. NELSON (obstructive sleep apnea) G47.33 327.23   13. Restless leg syndrome G25.81 333.94   14. S/P AAA repair Z98.890 V45.89    Z86.79             This document may have been created with the aid of dictation software.   Text may contain errors, particularly phonetic errors

## 2019-03-19 ENCOUNTER — OFFICE VISIT (OUTPATIENT)
Dept: FAMILY MEDICINE CLINIC | Age: 69
End: 2019-03-19

## 2019-03-19 VITALS
SYSTOLIC BLOOD PRESSURE: 120 MMHG | DIASTOLIC BLOOD PRESSURE: 70 MMHG | WEIGHT: 260.4 LBS | HEIGHT: 75 IN | TEMPERATURE: 97.7 F | RESPIRATION RATE: 16 BRPM | HEART RATE: 56 BPM | OXYGEN SATURATION: 100 % | BODY MASS INDEX: 32.38 KG/M2

## 2019-03-19 DIAGNOSIS — G47.33 OSA (OBSTRUCTIVE SLEEP APNEA): Chronic | ICD-10-CM

## 2019-03-19 DIAGNOSIS — M19.90 ARTHRITIS: Chronic | ICD-10-CM

## 2019-03-19 DIAGNOSIS — R79.89 ELEVATED SERUM CREATININE: ICD-10-CM

## 2019-03-19 DIAGNOSIS — Z86.79 S/P AAA REPAIR: Chronic | ICD-10-CM

## 2019-03-19 DIAGNOSIS — E11.42 TYPE 2 DIABETES MELLITUS WITH DIABETIC POLYNEUROPATHY, WITHOUT LONG-TERM CURRENT USE OF INSULIN (HCC): Chronic | ICD-10-CM

## 2019-03-19 DIAGNOSIS — R94.4 DECREASED GFR: Chronic | ICD-10-CM

## 2019-03-19 DIAGNOSIS — G25.81 RESTLESS LEG SYNDROME: Chronic | ICD-10-CM

## 2019-03-19 DIAGNOSIS — M87.059 AVASCULAR NECROSIS OF FEMORAL HEAD, UNSPECIFIED LATERALITY (HCC): Chronic | ICD-10-CM

## 2019-03-19 DIAGNOSIS — I25.10 CORONARY ARTERY DISEASE INVOLVING NATIVE CORONARY ARTERY OF NATIVE HEART WITHOUT ANGINA PECTORIS: Chronic | ICD-10-CM

## 2019-03-19 DIAGNOSIS — K59.00 CONSTIPATION, UNSPECIFIED CONSTIPATION TYPE: Primary | ICD-10-CM

## 2019-03-19 DIAGNOSIS — E78.00 HYPERCHOLESTEROLEMIA: Chronic | ICD-10-CM

## 2019-03-19 DIAGNOSIS — E11.42 DIABETIC POLYNEUROPATHY ASSOCIATED WITH TYPE 2 DIABETES MELLITUS (HCC): ICD-10-CM

## 2019-03-19 DIAGNOSIS — I10 ESSENTIAL HYPERTENSION: Chronic | ICD-10-CM

## 2019-03-19 DIAGNOSIS — M53.9 MULTILEVEL DEGENERATIVE DISC DISEASE: Chronic | ICD-10-CM

## 2019-03-19 DIAGNOSIS — Z98.890 S/P AAA REPAIR: Chronic | ICD-10-CM

## 2019-03-19 RX ORDER — POLYETHYLENE GLYCOL 3350 17 G/17G
17 POWDER, FOR SOLUTION ORAL DAILY
Qty: 235 G | Refills: 1 | Status: SHIPPED | OUTPATIENT
Start: 2019-03-19 | End: 2019-03-19 | Stop reason: SDUPTHER

## 2019-03-19 RX ORDER — LANOLIN ALCOHOL/MO/W.PET/CERES
400 CREAM (GRAM) TOPICAL DAILY
COMMUNITY
End: 2019-10-01

## 2019-03-19 RX ORDER — POLYETHYLENE GLYCOL 3350 17 G/17G
17 POWDER, FOR SOLUTION ORAL DAILY
Qty: 235 G | Refills: 1 | Status: SHIPPED | OUTPATIENT
Start: 2019-03-19 | End: 2019-08-27

## 2019-03-19 RX ORDER — DOCUSATE SODIUM 100 MG/1
100 CAPSULE, LIQUID FILLED ORAL
Qty: 180 CAP | Refills: 1 | Status: SHIPPED | OUTPATIENT
Start: 2019-03-19 | End: 2019-03-19 | Stop reason: SDUPTHER

## 2019-03-19 RX ORDER — DOCUSATE SODIUM 100 MG/1
100 CAPSULE, LIQUID FILLED ORAL
Qty: 180 CAP | Refills: 1 | Status: SHIPPED | OUTPATIENT
Start: 2019-03-19 | End: 2019-06-17

## 2019-03-19 NOTE — PROGRESS NOTES
Whit Liao is a 77 y.o. male (: 1950) presenting to address:    Chief Complaint   Patient presents with   [facility] Follow Up       Vitals:    19 0817   BP: 120/70   Pulse: (!) 56   Resp: 16   Temp: 97.7 °F (36.5 °C)   TempSrc: Oral   SpO2: 100%   Weight: 260 lb 6.4 oz (118.1 kg)   Height: 6' 3\" (1.905 m)   PainSc:   0 - No pain       Hearing/Vision:   No exam data present    Learning Assessment:     Learning Assessment 2015   PRIMARY LEARNER Patient   HIGHEST LEVEL OF EDUCATION - PRIMARY LEARNER  2 YEARS OF COLLEGE   BARRIERS PRIMARY LEARNER NONE   CO-LEARNER CAREGIVER No   PRIMARY LANGUAGE ENGLISH   LEARNER PREFERENCE PRIMARY DEMONSTRATION   ANSWERED BY Rizwana Jones   RELATIONSHIP SELF     Depression Screening:     3 most recent PHQ Screens 2018   Little interest or pleasure in doing things Not at all   Feeling down, depressed, irritable, or hopeless Not at all   Total Score PHQ 2 0     Fall Risk Assessment:     Fall Risk Assessment, last 12 mths 2018   Able to walk? Yes   Fall in past 12 months? No   Fall with injury? -   Number of falls in past 12 months -   Fall Risk Score -     Abuse Screening:     Abuse Screening Questionnaire 2018   Do you ever feel afraid of your partner? N   Are you in a relationship with someone who physically or mentally threatens you? N   Is it safe for you to go home? Y     Coordination of Care Questionaire:   1. Have you been to the ER, urgent care clinic since your last visit? Hospitalized since your last visit? YES  ER 2019    2. Have you seen or consulted any other health care providers outside of the [address] [place] [address] since your last visit? Include any pap smears or colon screening. NO    Advanced Directive:   1. Do you have an Advanced Directive? NO    2. Would you like information on Advanced Directives?  YES

## 2019-03-19 NOTE — PATIENT INSTRUCTIONS
Labs today to check on diabetes. I may adjust your 10 mg glipizide dosing based on results. Bowel cleanse with 235 grams of miralax (one bottle) mixed in 64 ounces of a non-alcoholic, non-carbonated, clear or yellow beverage of your choice (I like to use Crystal light lemonade or water). Drink plenty of water while you do the bowel cleanse to avoid getting dehydrated. Continue twice daily docusate sodium. Routine follow up with Pieter Hebert MD in 4 months  Call office 1-2 weeks prior to appointment to confirm if testing is required before appointment. Lab hours at [facility] are 7:30am-3pm Monday-Friday. Check in 15 minutes prior to your scheduled appointment time. Call and request an earlier appointment if needed to address any questions or concerns . TESTING RESULTS   Results will be released to [employer]. Normal results will be sent via mail. If you have questions about your results, please schedule a follow up appointment to discuss with your PCP. MEDICATION REFILLS    -- Please allow at least 2 business days for refill requests to be addressed. Medication refills may be requested through your pharmacy. Refills will not be provided by the after hours/on call provider.

## 2019-03-20 ENCOUNTER — HOSPITAL ENCOUNTER (OUTPATIENT)
Dept: LAB | Age: 69
Discharge: HOME | End: 2019-03-20
Payer: MEDICARE

## 2019-03-20 DIAGNOSIS — I10 ESSENTIAL HYPERTENSION: Chronic | ICD-10-CM

## 2019-03-20 DIAGNOSIS — R79.89 ELEVATED SERUM CREATININE: ICD-10-CM

## 2019-03-20 DIAGNOSIS — E11.42 DIABETIC POLYNEUROPATHY ASSOCIATED WITH TYPE 2 DIABETES MELLITUS (HCC): ICD-10-CM

## 2019-03-20 DIAGNOSIS — R94.4 DECREASED GFR: Chronic | ICD-10-CM

## 2019-03-20 DIAGNOSIS — E78.00 HYPERCHOLESTEROLEMIA: Chronic | ICD-10-CM

## 2019-03-20 DIAGNOSIS — E11.42 TYPE 2 DIABETES MELLITUS WITH DIABETIC POLYNEUROPATHY, WITHOUT LONG-TERM CURRENT USE OF INSULIN (HCC): Chronic | ICD-10-CM

## 2019-03-20 LAB
ALBUMIN SERPL-MCNC: 4.1 G/DL (ref 3.4–5)
ALBUMIN/GLOB SERPL: 1.2 {RATIO} (ref 0.8–1.7)
ALP SERPL-CCNC: 77 U/L (ref 45–117)
ALT SERPL-CCNC: 27 U/L (ref 16–61)
ANION GAP SERPL CALC-SCNC: 4 MMOL/L (ref 3–18)
AST SERPL-CCNC: 15 U/L (ref 15–37)
BASOPHILS # BLD: 0 K/UL (ref 0–0.1)
BASOPHILS NFR BLD: 0 % (ref 0–2)
BILIRUB SERPL-MCNC: 0.4 MG/DL (ref 0.2–1)
BUN SERPL-MCNC: 17 MG/DL (ref 7–18)
BUN/CREAT SERPL: 15 (ref 12–20)
CALCIUM SERPL-MCNC: 8.8 MG/DL (ref 8.5–10.1)
CHLORIDE SERPL-SCNC: 105 MMOL/L (ref 100–108)
CHOLEST SERPL-MCNC: 192 MG/DL
CO2 SERPL-SCNC: 28 MMOL/L (ref 21–32)
CREAT SERPL-MCNC: 1.1 MG/DL (ref 0.6–1.3)
DIFFERENTIAL METHOD BLD: ABNORMAL
EOSINOPHIL # BLD: 0.3 K/UL (ref 0–0.4)
EOSINOPHIL NFR BLD: 3 % (ref 0–5)
ERYTHROCYTE [DISTWIDTH] IN BLOOD BY AUTOMATED COUNT: 13.4 % (ref 11.6–14.5)
GLOBULIN SER CALC-MCNC: 3.5 G/DL (ref 2–4)
GLUCOSE SERPL-MCNC: 130 MG/DL (ref 74–99)
HCT VFR BLD AUTO: 42.2 % (ref 36–48)
HDLC SERPL-MCNC: 35 MG/DL (ref 40–60)
HDLC SERPL: 5.5 {RATIO} (ref 0–5)
HGB BLD-MCNC: 13.9 G/DL (ref 13–16)
LDLC SERPL CALC-MCNC: 113.8 MG/DL (ref 0–100)
LIPID PROFILE,FLP: ABNORMAL
LYMPHOCYTES # BLD: 1.9 K/UL (ref 0.9–3.6)
LYMPHOCYTES NFR BLD: 23 % (ref 21–52)
MCH RBC QN AUTO: 30.3 PG (ref 24–34)
MCHC RBC AUTO-ENTMCNC: 32.9 G/DL (ref 31–37)
MCV RBC AUTO: 91.9 FL (ref 74–97)
MONOCYTES # BLD: 0.6 K/UL (ref 0.05–1.2)
MONOCYTES NFR BLD: 8 % (ref 3–10)
NEUTS SEG # BLD: 5.4 K/UL (ref 1.8–8)
NEUTS SEG NFR BLD: 66 % (ref 40–73)
PLATELET # BLD AUTO: 247 K/UL (ref 135–420)
PMV BLD AUTO: 9.8 FL (ref 9.2–11.8)
POTASSIUM SERPL-SCNC: 4.5 MMOL/L (ref 3.5–5.5)
PROT SERPL-MCNC: 7.6 G/DL (ref 6.4–8.2)
RBC # BLD AUTO: 4.59 M/UL (ref 4.7–5.5)
SODIUM SERPL-SCNC: 137 MMOL/L (ref 136–145)
TRIGL SERPL-MCNC: 216 MG/DL (ref ?–150)
VLDLC SERPL CALC-MCNC: 43.2 MG/DL
WBC # BLD AUTO: 8.1 K/UL (ref 4.6–13.2)

## 2019-03-20 PROCEDURE — 85025 COMPLETE CBC W/AUTO DIFF WBC: CPT

## 2019-03-20 PROCEDURE — 36415 COLL VENOUS BLD VENIPUNCTURE: CPT

## 2019-03-20 PROCEDURE — 80053 COMPREHEN METABOLIC PANEL: CPT

## 2019-03-20 PROCEDURE — 83036 HEMOGLOBIN GLYCOSYLATED A1C: CPT

## 2019-03-20 PROCEDURE — 80061 LIPID PANEL: CPT

## 2019-03-21 LAB
EST. AVERAGE GLUCOSE BLD GHB EST-MCNC: 174 MG/DL
HBA1C MFR BLD: 7.7 % (ref 4.2–5.6)

## 2019-04-01 DIAGNOSIS — E11.42 TYPE 2 DIABETES MELLITUS WITH DIABETIC POLYNEUROPATHY, WITHOUT LONG-TERM CURRENT USE OF INSULIN (HCC): Chronic | ICD-10-CM

## 2019-04-01 RX ORDER — GLIPIZIDE 5 MG/1
5 TABLET ORAL 2 TIMES DAILY
Qty: 180 TAB | Refills: 3 | Status: SHIPPED | OUTPATIENT
Start: 2019-04-01 | End: 2019-09-25 | Stop reason: SDUPTHER

## 2019-04-01 NOTE — PROGRESS NOTES
Call patient with results/recommendations: 
 
Cholesterol and blood sugars have increased, HbA1C up to 7.7% (previously 7.3%). Try changing glipizide 5 mg twice daily (decrease dose from 10 mg due to reports of low BG). Make sure you are taking your Lipitor (cholesterol medication) regularly.

## 2019-04-16 DIAGNOSIS — E78.00 HYPERCHOLESTEROLEMIA: Chronic | ICD-10-CM

## 2019-04-16 DIAGNOSIS — E11.9 NON-INSULIN DEPENDENT TYPE 2 DIABETES MELLITUS (HCC): Chronic | ICD-10-CM

## 2019-04-16 NOTE — TELEPHONE ENCOUNTER
Requested Prescriptions     Pending Prescriptions Disp Refills    atorvastatin (LIPITOR) 80 mg tablet 90 Tab 3     Sig: Take 1 Tab by mouth daily. Take 80 mg by Mouth Once a Day. Remaining pills:0  Last appt:03/20/19  Next appt:08/27/19    Pharmacy:[facility]      Patient aware of 72 hour time frame.

## 2019-04-18 RX ORDER — ATORVASTATIN CALCIUM 80 MG/1
80 TABLET, FILM COATED ORAL DAILY
Qty: 90 TAB | Refills: 3 | Status: SHIPPED | OUTPATIENT
Start: 2019-04-18 | End: 2019-08-28 | Stop reason: ALTCHOICE

## 2019-05-30 NOTE — TELEPHONE ENCOUNTER
Requested Prescriptions     Pending Prescriptions Disp Refills    telmisartan-hydroCHLOROthiazide (MICARDIS HCT) 40-12.5 mg per tablet 90 Tab 3     Sig: Take 1 Tab by mouth daily. Patient calling to request refill on:      Remaining pills:  Last appt:  Next appt: Future Appointments   Date Time Provider Nora Bullard   8/27/2019  8:30 AM Octavia Smith MD [address] [place] [address],[facility] Floor:    [address] [place] [address]   Patient aware of 72 hour time frame.

## 2019-06-03 RX ORDER — TELMISARTAN AND HYDROCHLORTHIAZIDE 40; 12.5 MG/1; MG/1
1 TABLET ORAL DAILY
Qty: 90 TAB | Refills: 3 | Status: SHIPPED | OUTPATIENT
Start: 2019-06-03 | End: 2019-10-01

## 2019-08-26 NOTE — PROGRESS NOTES
HISTORY OF PRESENT ILLNESS  Francis Rock is a 77 y.o. male. Patient presents to establish care following the departure of his previous PCP from the practice. He is here for follow-up of chronic medical problems including type 2 diabetes, hypertension, hypercholesterolemia with a history of coronary artery disease. He is also here for a Medicare wellness evaluation. Follow Up Chronic Condition   The history is provided by the patient and medical records. This is a chronic problem. Pertinent negatives include no chest pain, no abdominal pain, no headaches and no shortness of breath. Physical   The history is provided by the patient and medical records. Pertinent negatives include no chest pain, no abdominal pain, no headaches and no shortness of breath. Mr#: [id number]      Patient Active Problem List   Diagnosis Code    Type 2 diabetes mellitus, without long-term current use of insulin ([facility] E11.9    Hypercholesterolemia E78.00    Arthritis M19.90    Acid reflux K21.9    Restless leg syndrome G25.81    Multilevel degenerative disc disease M53.9    Hypogonadism male E29.1    NELSON (obstructive sleep apnea) G47.33    Essential hypertension I10    BMI 33.0-33.9,adult Z68.33    S/P AAA repair Z98.890, Z86.79    Decreased GFR R94.4    Chronic seasonal allergic rhinitis due to pollen J30.1    Tinnitus of both ears H93.13    Avascular necrosis of femoral head (HCC) M87.059    Aortic root dilation (HCC) I77.810    Thoracic compression fracture (HCC) S22.000A    Bone lesion left femur M89.9    Diabetic polyneuropathy associated with type 2 diabetes mellitus (HCC) E11.42    Symptomatic bradycardia R00.1    Coronary artery disease involving native coronary artery of native heart without angina pectoris I25.10    Constipation K59.00         Current Outpatient Medications:     telmisartan-hydroCHLOROthiazide (MICARDIS HCT) 40-12.5 mg per tablet, Take 1 Tab by mouth daily. , Disp: 90 Tab, Rfl: 3    metFORMIN ER (GLUCOPHAGE XR) 750 mg tablet, Take 1 Tab by mouth two (2) times a day., Disp: 180 Tab, Rfl: 3    atorvastatin (LIPITOR) 80 mg tablet, Take 1 Tab by mouth daily. Take 80 mg by Mouth Once a Day. , Disp: 90 Tab, Rfl: 3    glipiZIDE (GLUCOTROL) 5 mg tablet, Take 1 Tab by mouth two (2) times a day., Disp: 180 Tab, Rfl: 3    magnesium oxide (MAG-OX) 400 mg tablet, Take 400 mg by mouth daily. , Disp: , Rfl:     polyethylene glycol (MIRALAX) 17 gram/dose powder, Take 17 g by mouth daily. , Disp: 235 g, Rfl: 1    varicella-zoster recombinant, PF, (SHINGRIX, PF,) 50 mcg/0.5 mL susr injection, 0.5mL by IntraMUSCular route once now and then repeat in 2-6 months, Disp: 0.5 mL, Rfl: 1    isosorbide mononitrate ER (IMDUR) 30 mg tablet, Take  by mouth daily. , Disp: , Rfl:     pramipexole (MIRAPEX) 0.5 mg tablet, Take 1 Tab by mouth three (3) times daily as needed. Indications: Restless Legs Syndrome, Disp: 270 Tab, Rfl: 3    omeprazole (PRILOSEC) 20 mg capsule, Take 1 Cap by mouth daily. , Disp: 90 Cap, Rfl: 3    Blood-Glucose Meter misc, Uncontrolled DM2 E11.65. BID testing. Disp 1 meter, Disp: 1 Each, Rfl: 0    glucose blood VI test strips (BLOOD GLUCOSE TEST) strip, Uncontrolled DM2 E11.65. BID testing. Disp 2 boxes of 100 each with 3 refills for 90 day supply. , Disp: 200 Strip, Rfl: 3    Lancets misc, Uncontrolled DM2. BID testing. Disp 2 boxes of 100 each with 3 refills for 90 day supply. , Disp: 2 Package, Rfl: 3    Azelastine (ASTEPRO) 0.15 % (205.5 mcg) nasal spray, two (2) times a day., Disp: , Rfl:     ALPRAZolam (XANAX) 0.25 mg tablet, Take 1 Tab by mouth two (2) times daily as needed for Anxiety. Max Daily Amount: 0.5 mg. Indications: anxiety, Disp: 90 Tab, Rfl: 1    naproxen (NAPROSYN) 500 mg tablet, Take 1 Tab by mouth two (2) times daily (with meals). Take 1 Tab by Mouth Twice Daily. , Disp: 180 Tab, Rfl: 1    glucose blood VI test strips (FREESTYLE LITE STRIPS) strip, ICD10 E11.65 - DM2 daily BG testing., Disp: 100 Strip, Rfl: 3    oxyCODONE-acetaminophen (PERCOCET) 5-325 mg per tablet, Take 1 Tab by mouth every six (6) hours as needed for Pain. Max Daily Amount: 4 Tabs., Disp: 20 Tab, Rfl: 0    OTHER, Heel cup  Indications: heel pain, Disp: 1 Each, Rfl: 0    Diabetic Supplies, Miscellan. (LANCING DEVICE WITH LANCETS) misc, DM2 uncontrolled - BG testing BID., Disp: 1 Each, Rfl: 0    olopatadine (PATANOL) 0.1 % ophthalmic solution, Administer 2 Drops to both eyes two (2) times daily as needed for Allergies. , Disp: 15 mL, Rfl: 3    triamcinolone (ARISTOCORT) 0.5 % topical cream, Apply  to affected area two (2) times a day. use thin layer to affected area, Disp: 15 g, Rfl: 0    aspirin 81 mg chewable tablet, Take 81 mg by Mouth Once a Day. , Disp: , Rfl:     omega-3 fatty acids-vitamin e (FISH OIL) 1,000 mg cap, Take 1 Cap by mouth two (2) times a day., Disp: 180 Cap, Rfl: 3    fexofenadine-pseudoephedrine (ALLEGRA-D 24 HOUR) 180-240 mg per tablet, Take 1 Tab by mouth daily. , Disp: 90 Tab, Rfl: 1    carboxymethylcellulose sodium 1 % DLGl, Apply  to eye., Disp: , Rfl:      Allergies   Allergen Reactions    Zocor [Simvastatin] Myalgia     Tolerating high dose Atorvastatin         Review of Systems   Constitutional: Negative for chills, fever and weight loss. HENT: Positive for hearing loss. Eyes: Negative for blurred vision and double vision. Appointment this week for glaucoma follow up   Respiratory: Negative for cough, shortness of breath and wheezing. Cardiovascular: Negative for chest pain, palpitations and leg swelling. Gastrointestinal: Negative for abdominal pain, blood in stool, constipation, diarrhea, heartburn, melena, nausea and vomiting. Genitourinary: Negative for dysuria and urgency. Musculoskeletal: Positive for back pain ( right thoracolumbar pain intermittently since a fall > 10 years ago). Negative for joint pain and myalgias.    Skin: Negative for itching and rash. Neurological: Negative for dizziness, tingling, sensory change, focal weakness and headaches. Endo/Heme/Allergies: Negative for environmental allergies. Psychiatric/Behavioral: Negative for depression. The patient is not nervous/anxious and does not have insomnia. Physical Exam   Constitutional: He is oriented to person, place, and time. He appears well-developed and well-nourished. HENT:   Head: Normocephalic. Right Ear: Tympanic membrane and ear canal normal.   Left Ear: Tympanic membrane and ear canal normal.   Mouth/Throat: Oropharynx is clear and moist.   Eyes: Pupils are equal, round, and reactive to light. Conjunctivae and EOM are normal.   Neck: Neck supple. Cardiovascular: Normal rate, regular rhythm, normal heart sounds and intact distal pulses. Pulmonary/Chest: Effort normal and breath sounds normal.   Abdominal: Soft. Bowel sounds are normal. There is no tenderness. Musculoskeletal: He exhibits no edema. Back exam reveals no tenderness to palpation, negative straight leg raise and AGNES bilaterally, LE muscle strength grossly intact and symmetrical   Neurological: He is alert and oriented to person, place, and time. He has normal reflexes. Skin: Skin is warm and dry. Psychiatric: He has a normal mood and affect. His behavior is normal.   Nursing note and vitals reviewed. Diabetic foot exam performed by Lali Arango MD     Measurement  Response Nurse Comment Physician Comment   Monofilament  R - 5/6  L - 4/6     Pulse DP R - 2+ (normal)  L - 2+ (normal)     Pulse TP R - 1+ (weak)  L - trace     Structural deformity R - None  L - None     Skin Integrity / Deformity R - None  L - None              Results for Cinthya Mosher (MRN [id number]) as of 8/27/2019 09:34   Ref.  Range 3/20/2019 07:35 8/27/2019 08:25   Hemoglobin A1c, (calculated) Latest Ref Range: 4.2 - 5.6 % 7.7 (H)    Hemoglobin A1c (POC) Latest Units: %  7.2       ASSESSMENT and PLAN ICD-10-CM ICD-9-CM    1. Medicare annual wellness visit, subsequent Z00.00 V70.0    2. Type 2 diabetes mellitus with diabetic polyneuropathy, without long-term current use of insulin (HCC) E11.42 250.60 AMB POC HEMOGLOBIN A1C     357.2 CBC WITH AUTOMATED DIFF      MICROALBUMIN, UR, RAND W/ MICROALB/CREAT RATIO      HM DIABETES FOOT EXAM   3. Essential hypertension I10 401.9 CBC WITH AUTOMATED DIFF      METABOLIC PANEL, COMPREHENSIVE      URINALYSIS W/ RFLX MICROSCOPIC   4. Hypercholesterolemia E78.00 272.0 LIPID PANEL      TSH 3RD GENERATION   5. Coronary artery disease involving native coronary artery of native heart without angina pectoris I25.10 414.01 CBC WITH AUTOMATED DIFF      LIPID PANEL   6. Encounter for immunization Z23 V03.89 INFLUENZA VACCINE INACTIVATED (IIV), SUBUNIT, ADJUVANTED, IM      ADMIN PNEUMOCOCCAL VACCINE   Physical therapy recommended for intermittent right lumbar pain but declined by the patient    Date of Service:  2019   Patient Name:  Rosalinda Evans   Patient :  1950     This is a Subsequent Medicare Annual Wellness Visit providing Personalized Prevention Plan Services (PPPS) (Performed 12 months after initial AWV and PPPS )     I have reviewed the patient's medical history in detail and updated the computerized patient record.      History     Past Medical History:   Diagnosis Date    Abrasion of skin 2016    Acid reflux     ACP (advance care planning)     has not yet filled out ACP, discussed 16    ACP (advance care planning)     has not yet filled out ACP, discussed 16     Aneurysm ([address]     Aortic root dilation ([address]     Borderline on echo 2015 (done through [facility])    Arthritis     Avascular necrosis of bones of both hips ([address] 2016    MRI L-spine revealed bialteral AVN femoral heads (MRI/CT dx, report submitted for scannin)     Avascular necrosis of femoral head ([address] 2015    BILATERAL, noted on CT 2/24/15 ([place]) - Avascular necrosis involving both femoral heads. No femoral head collapse identified.     Back pain, thoracic     Bilateral arm pain 1/25/2016    Bilateral low back pain without sciatica 1/25/2016    BMI 33.0-33.9,adult     Bone lesion left femur     Asymptomatic, MRI planned for October    Brachial neuritis 3/24/2014    Carpal tunnel syndrome, bilateral     Cervical spondylosis     C4-C6    Chest pain 12/15/2017    Chronic pain     Compression fracture [facility] thoracic vertebra [facility] 1/2016    Coronary artery disease involving native coronary artery of native heart without angina pectoris     DDD (degenerative disc disease), lumbosacral     DDD (degenerative disc disease), lumbosacral     Decreased libido 3/19/2014    3/19/2014:  Lack of interest recently -- Check testosterone levels, doubt this has anything to do with hx of aortic aneurysm surgery by Dr. Luly Wells disorder     Diabetes ([facility]     Elevated transaminase level     AST and ALT     Essential hypertension     Fall through floor 1/25/2016    GERD (gastroesophageal reflux disease)     Herniated nucleus pulposus     Lumbar diskectomy 1980, C4/5 HNP, has gotten ESIs in the past    History of poliomyelitis     History of shingles 7/2013    Right flank/back, after having vaccine    Hypercholesterolemia     Hypoglycemia 5/3/2017    Hypogonadism male     Impingement syndrome of left shoulder 8/2013    Internal hemorrhoids     Long term (current) use of anticoagulants     baby asa    Low serum testosterone level 3/24/2014    3/21/14:  Serum testosterone 179 (L), free testosterone 5.1 (L) -- Referring to urology to discuss prostate screening and options for repletion     Normal cardiac stress test 9/18/2012 9/18/2012 -- Normal Lexiscan cardiac stress and rest perfusion study, EF 62% (read by Dr. Ananth Ro, [facility]), Trung Thomson 9/21/2012 -- Holter monitor -- Alicia Erin 44%, tachy 0%, no arrhythmias detected, some premature atrial and ventricular beats 6/17/2010 -- Normal Lexiscan cardiac stress and rest perfusion study, EF 55% (read by Dr. Bladimir Lawson) MON[facility]     NELSON (obstructive sleep apnea)     Unable to tolerate CPAP    Osteoarthritis of right knee 11/13/2013    Xrays ([facility]) R knee - 11/12/13 -- Mild medial compartment DJD -- s/p steroid injection Nov 2013 [Dr. Yulissa Vizcarra which resolved knee pain after about a week     Pulmonary nodule, left 11/21/2013    Single 0.5 cm posterior lateral aspect of left lower lobe pleural-based pulmonary nodule; Incidental finding, stable from 11/2008 CT to 6/1/2010 CT     Restless leg syndrome     Routine adult health maintenance 11/21/2013    -- Colon cancer:   Colonoscopy done 11/29/2005 at [facility] -- Vaccinations:   Herpes Zoster vaccine given 2012 at [facility]    S/P AAA repair     Dr. Hazel Calero following, q3 year duplex of aorta    Seasonal allergic reaction     Spinal stenosis in cervical region 3/24/2014    Type 2 diabetes mellitus, controlled ([facility]       Past Surgical History:   Procedure Laterality Date    HX LUMBAR DISKECTOMY N/A 1980    HX ORTHOPAEDIC      left shoulder    VASCULAR SURGERY PROCEDURE UNLIST N/A     AAA repair with stent, US's at [facility]     Current Outpatient Medications   Medication Sig Dispense Refill    telmisartan-hydroCHLOROthiazide (MICARDIS HCT) 40-12.5 mg per tablet Take 1 Tab by mouth daily. 90 Tab 3    metFORMIN ER (GLUCOPHAGE XR) 750 mg tablet Take 1 Tab by mouth two (2) times a day. 180 Tab 3    atorvastatin (LIPITOR) 80 mg tablet Take 1 Tab by mouth daily. Take 80 mg by Mouth Once a Day. 90 Tab 3    glipiZIDE (GLUCOTROL) 5 mg tablet Take 1 Tab by mouth two (2) times a day. 180 Tab 3    varicella-zoster recombinant, PF, (SHINGRIX, PF,) 50 mcg/0.5 mL susr injection 0.5mL by IntraMUSCular route once now and then repeat in 2-6 months 0.5 mL 1    pramipexole (MIRAPEX) 0.5 mg tablet Take 1 Tab by mouth three (3) times daily as needed.  Indications: Restless Legs Syndrome 270 Tab 3    omeprazole (PRILOSEC) 20 mg capsule Take 1 Cap by mouth daily. 90 Cap 3    Blood-Glucose Meter misc Uncontrolled DM2 E11.65. BID testing. Disp 1 meter 1 Each 0    glucose blood VI test strips (BLOOD GLUCOSE TEST) strip Uncontrolled DM2 E11.65. BID testing. Disp 2 boxes of 100 each with 3 refills for 90 day supply. 200 Strip 3    Lancets misc Uncontrolled DM2. BID testing. Disp 2 boxes of 100 each with 3 refills for 90 day supply. 2 Package 3    Azelastine (ASTEPRO) 0.15 % (205.5 mcg) nasal spray two (2) times a day.  ALPRAZolam (XANAX) 0.25 mg tablet Take 1 Tab by mouth two (2) times daily as needed for Anxiety. Max Daily Amount: 0.5 mg. Indications: anxiety 90 Tab 1    naproxen (NAPROSYN) 500 mg tablet Take 1 Tab by mouth two (2) times daily (with meals). Take 1 Tab by Mouth Twice Daily. 180 Tab 1    glucose blood VI test strips (FREESTYLE LITE STRIPS) strip ICD10 E11.65 - DM2 daily BG testing. 100 Strip 3    oxyCODONE-acetaminophen (PERCOCET) 5-325 mg per tablet Take 1 Tab by mouth every six (6) hours as needed for Pain. Max Daily Amount: 4 Tabs. 20 Tab 0    OTHER Heel cup  Indications: heel pain 1 Each 0    Diabetic Supplies, Miscellan. (LANCING DEVICE WITH LANCETS) misc DM2 uncontrolled - BG testing BID. 1 Each 0    olopatadine (PATANOL) 0.1 % ophthalmic solution Administer 2 Drops to both eyes two (2) times daily as needed for Allergies. 15 mL 3    triamcinolone (ARISTOCORT) 0.5 % topical cream Apply  to affected area two (2) times a day. use thin layer to affected area 15 g 0    omega-3 fatty acids-vitamin e (FISH OIL) 1,000 mg cap Take 1 Cap by mouth two (2) times a day. 180 Cap 3    magnesium oxide (MAG-OX) 400 mg tablet Take 400 mg by mouth daily.  isosorbide mononitrate ER (IMDUR) 30 mg tablet Take  by mouth daily.  carboxymethylcellulose sodium 1 % DLGl Apply  to eye.        Allergies   Allergen Reactions    Zocor [Simvastatin] Myalgia Tolerating high dose Atorvastatin     Family History   Problem Relation Age of Onset   Romaine Notice Cancer Mother         Rectal, metastasized late 76s    Heart Disease Father     Cancer Brother         Metastatic, not sure of primary    Cancer Maternal Uncle     Cancer Maternal Aunt      Social History     Tobacco Use    Smoking status: Former Smoker     Types: Cigarettes, Pipe    Smokeless tobacco: Former User    Tobacco comment: Quit 43 years ago (2015), pipe 10+ years ago   Substance Use Topics    Alcohol use: Yes     Alcohol/week: 0.0 standard drinks     Comment: Occasional beer, liquor or wine.   No heavy use     Patient Active Problem List   Diagnosis Code    Type 2 diabetes mellitus, without long-term current use of insulin (HCC) E11.9    Hypercholesterolemia E78.00    Arthritis M19.90    Acid reflux K21.9    Restless leg syndrome G25.81    Multilevel degenerative disc disease M53.9    Hypogonadism male E29.1    NELSON (obstructive sleep apnea) G47.33    Essential hypertension I10    BMI 33.0-33.9,adult Z68.33    S/P AAA repair Z98.890, Z86.79    Decreased GFR R94.4    Chronic seasonal allergic rhinitis due to pollen J30.1    Tinnitus of both ears H93.13    Avascular necrosis of femoral head ([facility]) M87.059    Aortic root dilation ([facility]) I77.810    Thoracic compression fracture ([facility]) S22.000A    Bone lesion left femur M89.9    Diabetic polyneuropathy associated with type 2 diabetes mellitus ([facility]) E11.42    Symptomatic bradycardia R00.1    Coronary artery disease involving native coronary artery of native heart without angina pectoris I25.10    Constipation K59.00       Living situation:   -- Lives  with family  -- Stairs in home yes    Diet, Lifestyle: nonsmoker    Exercise level: not active    Depression Risk Factor Screening:     3 most recent PHQ Screens 7/19/2018   Little interest or pleasure in doing things Not at all   Feeling down, depressed, irritable, or hopeless Not at all   Total Score PHQ 2 0     Alcohol Risk Factor Screening: You do not drink alcohol or very rarely. Functional Ability and Level of Safety:     Hearing Loss   Hearing is good. The patient needs further evaluation. Activities of Daily Living   Self-care. Requires assistance with: no ADLs    Fall Risk     Fall Risk Assessment, last 12 mths 7/19/2018   Able to walk? Yes   Fall in past 12 months? No   Fall with injury? -   Number of falls in past 12 months -   Fall Risk Score -     Abuse Screen   Patient is not abused    Review of Systems   Review of systems reported in the separate problem-oriented documentation. Physical Examination   Physical exam reported in the separate problem-oriented documentation. VS: There were no vitals taken for this visit. No exam data present     Evaluation of Cognitive Function:  Mood/affect:  happy  Appearance: age appropriate  Family member/caregiver input: no    Patient Care Team:  Sirisha Keys MD as PCP - General (Internal Medicine)  Brynn Wilkins MD as Consulting Provider (Orthopedic Surgery)  Griffin Hooks MD (General Surgery)  Lamberto Mendes MD (Vascular Surgery)  Fabio Jean Baptiste DO as Consulting Provider (Physical Medicine and Rehab)  Everardo Douglas MD as Consulting Provider (Neurosurgery)  Royal Stephanie MD as Consulting Provider (Cardiology)    Advice/Counseling   Education and counseling provided:  Are appropriate based on today's review and evaluation  Advance Directives counseling/discussion    Assessment/Plan       ICD-10-CM ICD-9-CM    1. Medicare annual wellness visit, subsequent Z00.00 V70.0    2. Type 2 diabetes mellitus with diabetic polyneuropathy, without long-term current use of insulin (HCC) E11.42 250.60 AMB POC HEMOGLOBIN A1C     357.2    3. Essential hypertension I10 401.9    4. Hypercholesterolemia E78.00 272.0    5. Coronary artery disease involving native coronary artery of native heart without angina pectoris I25.10 414.01    6. Encounter for immunization Z23 V03.89 INFLUENZA VACCINE INACTIVATED (IIV), SUBUNIT, ADJUVANTED, IM      ADMIN PNEUMOCOCCAL VACCINE     Immunization History   Administered Date(s) Administered    Influenza High Dose Vaccine PF 09/06/2017    Influenza Vaccine 09/24/2012, 10/02/2013, 10/08/2014, 09/02/2015, 10/03/2016    Pneumococcal Conjugate (PCV-13) 01/17/2017    Pneumococcal Polysaccharide (PPSV-23) 07/19/2018    Zoster Vaccine, Live 09/24/2012, 10/18/2018           5-year personal health care plan:  Complete and return Advance Directives form  Avoid dietary starch and sugar and follow a program of regular aerobic exercise  Shingrix current   Tdap immunizations available at the pharmacy  Influenza immunization due today  Lab today, further disposition pending lab results  Continue current medications pending lab results  Diabetic eye exam/glaucoma screening due 10/29/2020  Office follow-up and lab due 2/29/2020  Colonoscopy due 5/2020  Follow-up and Medicare wellness evaluation 8/2020    Guido Xochitlraven was provided a written 5-year personalized preventive services plan, which was included in his Sara Licona MD

## 2019-08-27 ENCOUNTER — OFFICE VISIT (OUTPATIENT)
Dept: FAMILY MEDICINE CLINIC | Age: 69
End: 2019-08-27

## 2019-08-27 ENCOUNTER — HOSPITAL ENCOUNTER (OUTPATIENT)
Dept: LAB | Age: 69
Discharge: HOME | End: 2019-08-27
Payer: MEDICARE

## 2019-08-27 VITALS
SYSTOLIC BLOOD PRESSURE: 126 MMHG | OXYGEN SATURATION: 97 % | WEIGHT: 260.6 LBS | RESPIRATION RATE: 14 BRPM | HEIGHT: 75 IN | TEMPERATURE: 97.7 F | HEART RATE: 60 BPM | BODY MASS INDEX: 32.4 KG/M2 | DIASTOLIC BLOOD PRESSURE: 82 MMHG

## 2019-08-27 DIAGNOSIS — E78.00 HYPERCHOLESTEROLEMIA: ICD-10-CM

## 2019-08-27 DIAGNOSIS — E11.42 TYPE 2 DIABETES MELLITUS WITH DIABETIC POLYNEUROPATHY, WITHOUT LONG-TERM CURRENT USE OF INSULIN (HCC): ICD-10-CM

## 2019-08-27 DIAGNOSIS — I25.10 CORONARY ARTERY DISEASE INVOLVING NATIVE CORONARY ARTERY OF NATIVE HEART WITHOUT ANGINA PECTORIS: ICD-10-CM

## 2019-08-27 DIAGNOSIS — I10 ESSENTIAL HYPERTENSION: ICD-10-CM

## 2019-08-27 DIAGNOSIS — Z00.00 MEDICARE ANNUAL WELLNESS VISIT, SUBSEQUENT: Primary | ICD-10-CM

## 2019-08-27 DIAGNOSIS — Z23 ENCOUNTER FOR IMMUNIZATION: ICD-10-CM

## 2019-08-27 PROBLEM — Z98.890 HISTORY OF COLONOSCOPY WITH POLYPECTOMY: Status: ACTIVE | Noted: 2019-08-27

## 2019-08-27 PROBLEM — Z86.010 HISTORY OF COLONOSCOPY WITH POLYPECTOMY: Status: ACTIVE | Noted: 2019-08-27

## 2019-08-27 LAB
ALBUMIN SERPL-MCNC: 3.9 G/DL (ref 3.4–5)
ALBUMIN/GLOB SERPL: 1.1 {RATIO} (ref 0.8–1.7)
ALP SERPL-CCNC: 75 U/L (ref 45–117)
ALT SERPL-CCNC: 30 U/L (ref 16–61)
ANION GAP SERPL CALC-SCNC: 6 MMOL/L (ref 3–18)
APPEARANCE UR: CLEAR
AST SERPL-CCNC: 16 U/L (ref 10–38)
BASOPHILS # BLD: 0 K/UL (ref 0–0.1)
BASOPHILS NFR BLD: 1 % (ref 0–2)
BILIRUB SERPL-MCNC: 0.6 MG/DL (ref 0.2–1)
BILIRUB UR QL: NEGATIVE
BUN SERPL-MCNC: 14 MG/DL (ref 7–18)
BUN/CREAT SERPL: 12 (ref 12–20)
CALCIUM SERPL-MCNC: 8.8 MG/DL (ref 8.5–10.1)
CHLORIDE SERPL-SCNC: 105 MMOL/L (ref 100–111)
CHOLEST SERPL-MCNC: 229 MG/DL
CO2 SERPL-SCNC: 27 MMOL/L (ref 21–32)
COLOR UR: YELLOW
CREAT SERPL-MCNC: 1.17 MG/DL (ref 0.6–1.3)
CREAT UR-MCNC: 131 MG/DL (ref 30–125)
DIFFERENTIAL METHOD BLD: ABNORMAL
EOSINOPHIL # BLD: 0.3 K/UL (ref 0–0.4)
EOSINOPHIL NFR BLD: 5 % (ref 0–5)
ERYTHROCYTE [DISTWIDTH] IN BLOOD BY AUTOMATED COUNT: 13.6 % (ref 11.6–14.5)
GLOBULIN SER CALC-MCNC: 3.4 G/DL (ref 2–4)
GLUCOSE SERPL-MCNC: 157 MG/DL (ref 74–99)
GLUCOSE UR STRIP.AUTO-MCNC: NEGATIVE MG/DL
HBA1C MFR BLD HPLC: 7.2 %
HCT VFR BLD AUTO: 42 % (ref 36–48)
HDLC SERPL-MCNC: 32 MG/DL (ref 40–60)
HDLC SERPL: 7.2 {RATIO} (ref 0–5)
HGB BLD-MCNC: 13.9 G/DL (ref 13–16)
HGB UR QL STRIP: NEGATIVE
KETONES UR QL STRIP.AUTO: NEGATIVE MG/DL
LDLC SERPL CALC-MCNC: 156.6 MG/DL (ref 0–100)
LEUKOCYTE ESTERASE UR QL STRIP.AUTO: NEGATIVE
LIPID PROFILE,FLP: ABNORMAL
LYMPHOCYTES # BLD: 1.9 K/UL (ref 0.9–3.6)
LYMPHOCYTES NFR BLD: 28 % (ref 21–52)
MCH RBC QN AUTO: 29.7 PG (ref 24–34)
MCHC RBC AUTO-ENTMCNC: 33.1 G/DL (ref 31–37)
MCV RBC AUTO: 89.7 FL (ref 74–97)
MICROALBUMIN UR-MCNC: 1.36 MG/DL (ref 0–3)
MICROALBUMIN/CREAT UR-RTO: 10 MG/G (ref 0–30)
MONOCYTES # BLD: 0.6 K/UL (ref 0.05–1.2)
MONOCYTES NFR BLD: 9 % (ref 3–10)
NEUTS SEG # BLD: 4 K/UL (ref 1.8–8)
NEUTS SEG NFR BLD: 57 % (ref 40–73)
NITRITE UR QL STRIP.AUTO: NEGATIVE
PH UR STRIP: 5 [PH] (ref 5–8)
PLATELET # BLD AUTO: 231 K/UL (ref 135–420)
PMV BLD AUTO: 9.9 FL (ref 9.2–11.8)
POTASSIUM SERPL-SCNC: 4.1 MMOL/L (ref 3.5–5.5)
PROT SERPL-MCNC: 7.3 G/DL (ref 6.4–8.2)
PROT UR STRIP-MCNC: NEGATIVE MG/DL
RBC # BLD AUTO: 4.68 M/UL (ref 4.7–5.5)
SODIUM SERPL-SCNC: 138 MMOL/L (ref 136–145)
SP GR UR REFRACTOMETRY: 1.02 (ref 1–1.03)
TRIGL SERPL-MCNC: 202 MG/DL (ref ?–150)
TSH SERPL DL<=0.05 MIU/L-ACNC: 2.61 UIU/ML (ref 0.36–3.74)
UROBILINOGEN UR QL STRIP.AUTO: 0.2 EU/DL (ref 0.2–1)
VLDLC SERPL CALC-MCNC: 40.4 MG/DL
WBC # BLD AUTO: 7 K/UL (ref 4.6–13.2)

## 2019-08-27 PROCEDURE — 36415 COLL VENOUS BLD VENIPUNCTURE: CPT

## 2019-08-27 PROCEDURE — 82043 UR ALBUMIN QUANTITATIVE: CPT

## 2019-08-27 PROCEDURE — 80053 COMPREHEN METABOLIC PANEL: CPT

## 2019-08-27 PROCEDURE — 85025 COMPLETE CBC W/AUTO DIFF WBC: CPT

## 2019-08-27 PROCEDURE — 81003 URINALYSIS AUTO W/O SCOPE: CPT

## 2019-08-27 PROCEDURE — 84443 ASSAY THYROID STIM HORMONE: CPT

## 2019-08-27 PROCEDURE — 80061 LIPID PANEL: CPT

## 2019-08-27 NOTE — PROGRESS NOTES
Flu Immunization/s administered 8/27/2019 by Elvis Briceño with patient consent. Patient tolerated procedure well. No reactions noted.

## 2019-08-27 NOTE — PROGRESS NOTES
Meaghan Avery is a 77 y.o. male (: 1950) presenting to address:    Chief Complaint   Patient presents with    Annual Wellness Visit     requests flu vaccine       Vitals:    19 0816   BP: 126/82   Pulse: 60   Resp: 14   Temp: 97.7 °F (36.5 °C)   TempSrc: Oral   SpO2: 97%   Weight: 260 lb 9.6 oz (118.2 kg)   Height: 6' 3\" (1.905 m)   PainSc:   0 - No pain       Hearing/Vision:      Hearing Screening    125Hz 250Hz 500Hz 1000Hz 2000Hz 3000Hz 4000Hz 6000Hz 8000Hz   Right ear:   40 40 40  Fail     Left ear:   38 55 42  Fail        Visual Acuity Screening    Right eye Left eye Both eyes   Without correction:      With correction: 20/20 20/25 20/20       Learning Assessment:     Learning Assessment 2019   PRIMARY LEARNER Patient   HIGHEST LEVEL OF EDUCATION - PRIMARY LEARNER  SOME COLLEGE   BARRIERS PRIMARY LEARNER NONE   CO-LEARNER CAREGIVER No   PRIMARY LANGUAGE ENGLISH    NEED No   LEARNER PREFERENCE PRIMARY DEMONSTRATION   ANSWERED BY patient   RELATIONSHIP SELF     Depression Screening:     3 most recent PHQ Screens 2019   Little interest or pleasure in doing things Not at all   Feeling down, depressed, irritable, or hopeless Not at all   Total Score PHQ 2 0     Fall Risk Assessment:     Fall Risk Assessment, last 12 mths 2019   Able to walk? Yes   Fall in past 12 months? No   Fall with injury? -   Number of falls in past 12 months -   Fall Risk Score -     Abuse Screening:     Abuse Screening Questionnaire 2019   Do you ever feel afraid of your partner? N   Are you in a relationship with someone who physically or mentally threatens you? N   Is it safe for you to go home? Y     Coordination of Care Questionaire:   1. Have you been to the ER, urgent care clinic since your last visit? Hospitalized since your last visit? NO    2. Have you seen or consulted any other health care providers outside of the [address] [place] [address] since your last visit?   Include any pap smears or colon screening. YES, cardiology    Advanced Directive:   1. Do you have an Advanced Directive? NO    2. Would you like information on Advanced Directives?  NO

## 2019-08-27 NOTE — PATIENT INSTRUCTIONS
5-year personal health care plan:  Complete and return Advance Directives form  Avoid dietary starch and sugar and follow a program of regular aerobic exercise  Shingrix current   Tdap immunizations available at the pharmacy  Influenza immunization due today  Lab today, further disposition pending lab results  Continue current medications pending lab results  Diabetic eye exam/glaucoma screening due 10/29/2020  Office follow-up and lab due 2/29/2020  Colonoscopy due 5/2020  Follow-up and Medicare wellness evaluation 8/2020       Well Visit, Over 72: Care Instructions  Your Care Instructions    Physical exams can help you stay healthy. Your doctor has checked your overall health and may have suggested ways to take good care of yourself. He or she also may have recommended tests. At home, you can help prevent illness with healthy eating, regular exercise, and other steps. Follow-up care is a key part of your treatment and safety. Be sure to make and go to all appointments, and call your doctor if you are having problems. It's also a good idea to know your test results and keep a list of the medicines you take. How can you care for yourself at home? · Reach and stay at a healthy weight. This will lower your risk for many problems, such as obesity, diabetes, heart disease, and high blood pressure. · Get at least 30 minutes of exercise on most days of the week. Walking is a good choice. You also may want to do other activities, such as running, swimming, cycling, or playing tennis or team sports. · Do not smoke. Smoking can make health problems worse. If you need help quitting, talk to your doctor about stop-smoking programs and medicines. These can increase your chances of quitting for good. · Protect your skin from too much sun. When you're outdoors from 10 a.m. to 4 p.m., stay in the shade or cover up with clothing and a hat with a wide brim. Wear sunglasses that block UV rays.  Even when it's cloudy, put broad-spectrum sunscreen (SPF 30 or higher) on any exposed skin. · See a dentist one or two times a year for checkups and to have your teeth cleaned. · Wear a seat belt in the car. · Limit alcohol to 2 drinks a day for men and 1 drink a day for women. Too much alcohol can cause health problems. Follow your doctor's advice about when to have certain tests. These tests can spot problems early. For men and women  · Cholesterol. Your doctor will tell you how often to have this done based on your overall health and other things that can increase your risk for heart attack and stroke. · Blood pressure. Have your blood pressure checked during a routine doctor visit. Your doctor will tell you how often to check your blood pressure based on your age, your blood pressure results, and other factors. · Diabetes. Ask your doctor whether you should have tests for diabetes. · Vision. Experts recommend that you have yearly exams for glaucoma and other age-related eye problems. · Hearing. Tell your doctor if you notice any change in your hearing. You can have tests to find out how well you hear. · Colon cancer tests. Keep having colon cancer tests as your doctor recommends. You can have one of several types of tests. · Heart attack and stroke risk. At least every 4 to 6 years, you should have your risk for heart attack and stroke assessed. Your doctor uses factors such as your age, blood pressure, cholesterol, and whether you smoke or have diabetes to show what your risk for a heart attack or stroke is over the next 10 years. · Osteoporosis. Talk to your doctor about whether you should have a bone density test to find out whether you have thinning bones. Also ask your doctor about whether you should take calcium and vitamin D supplements. For women  · Pap test and pelvic exam. You may no longer need a Pap test. Talk with your doctor about whether to stop or continue to have Pap tests.   · Breast exam and mammogram. Ask how often you should have a mammogram, which is an X-ray of your breasts. A mammogram can spot breast cancer before it can be felt and when it is easiest to treat. · Thyroid disease. Talk to your doctor about whether to have your thyroid checked as part of a regular physical exam. Women have an increased chance of a thyroid problem. For men  · Prostate exam. Talk to your doctor about whether you should have a blood test (called a PSA test) for prostate cancer. Experts recommend that you discuss the benefits and risks of the test with your doctor before you decide whether to have this test. Some experts say that men ages 79 and older no longer need testing. · Abdominal aortic aneurysm. Ask your doctor whether you should have a test to check for an aneurysm. You may need a test if you ever smoked or if your parent, brother, sister, or child has had an aneurysm. When should you call for help? Watch closely for changes in your health, and be sure to contact your doctor if you have any problems or symptoms that concern you. Where can you learn more? Go to http://zuleima-randa.info/. Enter X344 in the search box to learn more about \"Well Visit, Over 65: Care Instructions. \"  Current as of: December 13, 2018  Content Version: 12.1  © 7998-5186 [employer]. Care instructions adapted under license by [employer] (which disclaims liability or warranty for this information). If you have questions about a medical condition or this instruction, always ask your healthcare professional. [place] [address] any warranty or liability for your use of this information.

## 2019-08-28 DIAGNOSIS — E78.00 HYPERCHOLESTEROLEMIA: Primary | ICD-10-CM

## 2019-08-28 RX ORDER — ROSUVASTATIN CALCIUM 40 MG/1
40 TABLET, COATED ORAL
Qty: 90 TAB | Refills: 1 | Status: SHIPPED | OUTPATIENT
Start: 2019-08-28 | End: 2019-10-01

## 2019-08-28 NOTE — PROGRESS NOTES
Please advised that lab results are satisfactory except for the cholesterol levels which show that the LDL cholesterol which is the bad cholesterol has risen from 113.8-156.6 with a goal of less than 70 for patients with diabetes. It is very important that he work hard to avoid dietary starch and sugar and is much as possible follow program of regular aerobic exercise. He should also start taking rosuvastatin 40 mg daily instead of atorvastatin 80 mg daily. The rosuvastatin 40 mg is actually more potent than the atorvastatin 80. A prescription for rosuvastatin has been sent to his pharmacy. Please ask him to come by for fasting lab studies only to check on his progress after he has been taking rosuvastatin for 3 months. He does not need an appointment for this.

## 2019-09-16 NOTE — TELEPHONE ENCOUNTER
Patient calling to request refill on:metformin      Remaining pills:0  Last appt: Tuesday, August 27, 2019  Next appt: Wednesday, February 26, 2020    Pharmacy:[facility]      Patient aware of 72 hour time frame. Requested Prescriptions     Pending Prescriptions Disp Refills    metFORMIN ER (GLUCOPHAGE XR) 750 mg tablet 180 Tab 3     Sig: Take 1 Tab by mouth two (2) times a day. Kitty Herman

## 2019-09-18 RX ORDER — METFORMIN HYDROCHLORIDE 750 MG/1
750 TABLET, EXTENDED RELEASE ORAL 2 TIMES DAILY
Qty: 180 TAB | Refills: 3 | Status: SHIPPED | OUTPATIENT
Start: 2019-09-18 | End: 2019-12-18 | Stop reason: DRUGHIGH

## 2019-09-25 RX ORDER — GLIPIZIDE 5 MG/1
5 TABLET ORAL 2 TIMES DAILY
Qty: 180 TAB | Refills: 3 | Status: SHIPPED | OUTPATIENT
Start: 2019-09-25 | End: 2020-02-26

## 2019-09-25 NOTE — TELEPHONE ENCOUNTER
Last seen: 8/27/19  Last filled: 4/1/19; glipizide 5 mg; qty 180 w/3 refills; takes one tab 2 times per day

## 2019-10-01 ENCOUNTER — OFFICE VISIT (OUTPATIENT)
Dept: FAMILY MEDICINE CLINIC | Age: 69
End: 2019-10-01

## 2019-10-01 VITALS
DIASTOLIC BLOOD PRESSURE: 70 MMHG | RESPIRATION RATE: 16 BRPM | OXYGEN SATURATION: 98 % | WEIGHT: 256.4 LBS | HEIGHT: 75 IN | SYSTOLIC BLOOD PRESSURE: 130 MMHG | HEART RATE: 45 BPM | BODY MASS INDEX: 31.88 KG/M2 | TEMPERATURE: 97.5 F

## 2019-10-01 DIAGNOSIS — B88.0 CHIGGER BITES: Primary | ICD-10-CM

## 2019-10-01 NOTE — PATIENT INSTRUCTIONS
Benzocaine (On the skin) Benzocaine ([id number]) Relieves pain and itching. Also used to numb your skin before a medical procedure. This medicine is a topical anesthetic. Brand Name(s): Americaine, Anacaine, Chiggerex, Mandelay Maximum Strength, Medicone Maximum Strength, Sridhar Screws There may be other brand names for this medicine. When This Medicine Should Not Be Used: This medicine is not right for everyone. Do not use it if you had an allergic reaction to benzocaine. How to Use This Medicine:  
Cream, Gel/Jelly, Liquid, Ointment, Pad, Spray, Swab · This medicine is not for long-term use. Do not use this medicine for longer than directed by your doctor. · Your doctor will tell you how much of this medicine to use, where to apply it, and how often to apply it. Do not use more medicine or use it more often than your doctor tells you to. Too much of this medicine or use on a large part of your skin can cause serious side effects. · Do not apply this medicine to open wounds, burns, broken or inflamed skin, or to a large area of skin unless directed by your doctor. · Do not get this medicine in your eyes. If the medicine does get in your eyes, wash your eyes with water right away. · Wash your hands with soap and water before and after you use this medicine. · To use this medicine: ¨ Gel: First dry the affected area as much as possible. Apply a thin layer of gel and wait 60 seconds while it dries into a smooth film coating. Do not peel off or remove the film coating. The coating will fall off on its own. ¨ Pad or swab: Open the package according to the directions. When treating a bee sting, remove the stinger before you use the medicine. Wipe the pad or swab across the affected skin area. ¨ Cream or ointment: Apply a thin layer to the affected area. Rub it in gently.  
¨ Aerosol spray: Hold the can 6 to 12 inches from the skin and spray for about 1 to 2 seconds. If you need to apply this medicine to your face, spray it in the palm of your hand and then wipe your hands on your face. Do not inhale the spray or use it near heat, open flame, or while smoking. Do not puncture, break, or burn the aerosol can. · Ask your doctor if you should cover your treated skin with a bandage. Let the medicine dry before you put the bandage on. 
· Missed dose: Apply a dose as soon as you can. If it is almost time for your next dose, wait until then and apply a regular dose. Do not apply extra medicine to make up for a missed dose. · Store the aerosol spray at room temperature, away from heat or fire. Store the cream, gel, liquid, or ointment at room temperature, away from heat and direct light. Do not freeze. Drugs and Foods to Avoid: Ask your doctor or pharmacist before using any other medicine, including over-the-counter medicines, vitamins, and herbal products. · Some medicines can affect how benzocaine works. Tell your doctor if you are taking medicines containing nitrates or nitrites, such as nitroglycerin. · Do not put cosmetics or skin care products on the treated skin. Warnings While Using This Medicine: · Tell your doctor if you are pregnant or breastfeeding, or if you have heart disease, lung or breathing problems (such as asthma, bronchitis, or emphysema), or any blood disease. · Call your doctor if your symptoms get worse or do not improve within 7 days, or get better and then come back. · Do not give this medicine to a child younger than 2 years, unless directed by a doctor. · This medicine may cause a rare but serious blood problem called methemoglobinemia. This condition may occur after use of the spray for medical procedures or use of the over-the-counter gel or liquid for mouth sores or teething in children. · Do not use this medicine to treat a skin problem your doctor has not examined. · Keep all medicine out of the reach of children. Never share your medicine with anyone. Possible Side Effects While Using This Medicine:  
Call your doctor right away if you notice any of these side effects: · Allergic reaction: Itching or hives, swelling in your face or hands, swelling or tingling in your mouth or throat, chest tightness, trouble breathing · Confusion, headache, lightheadedness, shortness of breath, or fast heartbeat · Fast breathing · Irritability, fainting, difficulty walking, or unusual drowsiness · Pale, gray, or blue skin, lips, or nails · Redness, pain, or swelling where the medicine is applied · Unusual tiredness or weakness If you notice other side effects that you think are caused by this medicine, tell your doctor. Call your doctor for medical advice about side effects. You may report side effects to FDA at 5-751-FDA-6340 © 2017 [address] Information is for End User's use only and may not be sold, redistributed or otherwise used for commercial purposes. The above information is an  only. It is not intended as medical advice for individual conditions or treatments. Talk to your doctor, nurse or pharmacist before following any medical regimen to see if it is safe and effective for you. Benzocaine (Americaine, Anacaine, Chigg Away, Chiggertox) - (On the skin) Why this medicine is used:  
Relieves pain and itching. Also used to numb your skin before a medical procedure. Contact a nurse or doctor right away if you have: 
· Pale, gray, or blue skin, lips, or nails · Irritability, fainting, difficulty walking, or unusual drowsiness Common side effects: 
· Redness, pain, or swelling where the medicine is applied © 2017 [address]  Information is for End User's use only and may not be sold, redistributed or otherwise used for commercial purposes.

## 2019-10-01 NOTE — PROGRESS NOTES
HISTORY OF PRESENT ILLNESS  Didier Faria is a 77 y.o. male. Severe itching on the legs below the knees for 2 days    Skin Problem   The history is provided by the patient and medical records. This is a new problem. The current episode started 2 days ago. Mr#: [id number]      Patient Active Problem List   Diagnosis Code    Type 2 diabetes mellitus, without long-term current use of insulin (HCC) E11.9    Hypercholesterolemia E78.00    Arthritis M19.90    Acid reflux K21.9    Restless leg syndrome G25.81    Multilevel degenerative disc disease M53.9    Hypogonadism male E29.1    NELSON (obstructive sleep apnea) G47.33    Essential hypertension I10    BMI 33.0-33.9,adult Z68.33    S/P AAA repair Z98.890, Z86.79    Decreased GFR R94.4    Chronic seasonal allergic rhinitis due to pollen J30.1    Tinnitus of both ears H93.13    Avascular necrosis of femoral head ([facility]) M87.059    Aortic root dilation ([facility]) I77.810    Thoracic compression fracture ([facility]) S22.000A    Bone lesion left femur M89.9    Diabetic polyneuropathy associated with type 2 diabetes mellitus (HCC) E11.42    Symptomatic bradycardia R00.1    Coronary artery disease involving native coronary artery of native heart without angina pectoris I25.10    Constipation K59.00    History of colonoscopy with polypectomy Z98.890, Z86.010         Current Outpatient Medications:     glipiZIDE (GLUCOTROL) 5 mg tablet, Take 1 Tab by mouth two (2) times a day., Disp: 180 Tab, Rfl: 3    metFORMIN ER (GLUCOPHAGE XR) 750 mg tablet, Take 1 Tab by mouth two (2) times a day., Disp: 180 Tab, Rfl: 3    varicella-zoster recombinant, PF, (SHINGRIX, PF,) 50 mcg/0.5 mL susr injection, 0.5mL by IntraMUSCular route once now and then repeat in 2-6 months, Disp: 0.5 mL, Rfl: 1    omeprazole (PRILOSEC) 20 mg capsule, Take 1 Cap by mouth daily. , Disp: 90 Cap, Rfl: 3    Blood-Glucose Meter misc, Uncontrolled DM2 E11.65. BID testing.   Disp 1 meter, Disp: 1 Each, Rfl: 0    glucose blood VI test strips (BLOOD GLUCOSE TEST) strip, Uncontrolled DM2 E11.65. BID testing. Disp 2 boxes of 100 each with 3 refills for 90 day supply. , Disp: 200 Strip, Rfl: 3    Lancets misc, Uncontrolled DM2. BID testing. Disp 2 boxes of 100 each with 3 refills for 90 day supply. , Disp: 2 Package, Rfl: 3    Azelastine (ASTEPRO) 0.15 % (205.5 mcg) nasal spray, two (2) times a day., Disp: , Rfl:     ALPRAZolam (XANAX) 0.25 mg tablet, Take 1 Tab by mouth two (2) times daily as needed for Anxiety. Max Daily Amount: 0.5 mg. Indications: anxiety, Disp: 90 Tab, Rfl: 1    naproxen (NAPROSYN) 500 mg tablet, Take 1 Tab by mouth two (2) times daily (with meals). Take 1 Tab by Mouth Twice Daily. , Disp: 180 Tab, Rfl: 1    glucose blood VI test strips (FREESTYLE LITE STRIPS) strip, ICD10 E11.65 - DM2 daily BG testing., Disp: 100 Strip, Rfl: 3    Diabetic Supplies, Miscellan. (LANCING DEVICE WITH LANCETS) misc, DM2 uncontrolled - BG testing BID., Disp: 1 Each, Rfl: 0    olopatadine (PATANOL) 0.1 % ophthalmic solution, Administer 2 Drops to both eyes two (2) times daily as needed for Allergies. , Disp: 15 mL, Rfl: 3    triamcinolone (ARISTOCORT) 0.5 % topical cream, Apply  to affected area two (2) times a day. use thin layer to affected area, Disp: 15 g, Rfl: 0    omega-3 fatty acids-vitamin e (FISH OIL) 1,000 mg cap, Take 1 Cap by mouth two (2) times a day., Disp: 180 Cap, Rfl: 3    carboxymethylcellulose sodium 1 % DLGl, Apply  to eye., Disp: , Rfl:      Allergies   Allergen Reactions    Zocor [Simvastatin] Myalgia     Tolerating high dose Atorvastatin       Review of Systems   Constitutional: Negative for fever and weight loss. Gastrointestinal: Negative for nausea. Skin: Positive for itching. Reports intense itching on both lower extremities since participating in a cleanup day at his [employer] this past weekend. Neurological: Negative for sensory change and focal weakness. Physical Exam   Constitutional: He is oriented to person, place, and time. He appears well-developed and well-nourished. HENT:   Head: Normocephalic. Eyes: EOM are normal.   Cardiovascular: Normal rate. Musculoskeletal: He exhibits no edema. Neurological: He is alert and oriented to person, place, and time. Skin: Skin is warm and dry. No rash noted. Red bite marks on the lower extremities primarily below the knees consistent with chigger bites   Psychiatric: He has a normal mood and affect. His behavior is normal.   Nursing note and vitals reviewed. ASSESSMENT and PLAN    ICD-10-CM ICD-9-CM    1.  Chigger bites B88.0 133.8    Avoid hot baths and showers, topical anti-itch agents as needed, extensive information provided about trigger bites and their treatment

## 2019-10-01 NOTE — PROGRESS NOTES
Sanjuana Wasserman is a 77 y.o. male (: 1950) presenting to address:    Chief Complaint   Patient presents with    Rash     hunting doves; very itchy rash on bilat lower legs; already received flu vaccine       Vitals:    10/01/19 0954   BP: 130/70   Pulse: (!) 45   Resp: 16   Temp: 97.5 °F (36.4 °C)   TempSrc: Oral   SpO2: 98%   Weight: 256 lb 6.4 oz (116.3 kg)   Height: 6' 3\" (1.905 m)   PainSc:   0 - No pain       Hearing/Vision:   No exam data present    Learning Assessment:     Learning Assessment 2019   PRIMARY LEARNER Patient   HIGHEST LEVEL OF EDUCATION - PRIMARY LEARNER  SOME COLLEGE   BARRIERS PRIMARY LEARNER NONE   CO-LEARNER CAREGIVER No   PRIMARY LANGUAGE ENGLISH    NEED No   LEARNER PREFERENCE PRIMARY DEMONSTRATION   ANSWERED BY patient   RELATIONSHIP SELF     Depression Screening:     3 most recent PHQ Screens 10/1/2019   Little interest or pleasure in doing things Not at all   Feeling down, depressed, irritable, or hopeless Not at all   Total Score PHQ 2 0     Fall Risk Assessment:     Fall Risk Assessment, last 12 mths 10/1/2019   Able to walk? Yes   Fall in past 12 months? No   Fall with injury? -   Number of falls in past 12 months -   Fall Risk Score -     Abuse Screening:     Abuse Screening Questionnaire 2019   Do you ever feel afraid of your partner? N   Are you in a relationship with someone who physically or mentally threatens you? N   Is it safe for you to go home? Y     Coordination of Care Questionaire:   1. Have you been to the ER, urgent care clinic since your last visit? Hospitalized since your last visit? NO    2. Have you seen or consulted any other health care providers outside of the [address] [place] [address] since your last visit? Include any pap smears or colon screening. NO    Advanced Directive:   1. Do you have an Advanced Directive? NO    2. Would you like information on Advanced Directives?  NO

## 2019-10-28 ENCOUNTER — OFFICE VISIT (OUTPATIENT)
Dept: FAMILY MEDICINE CLINIC | Age: 69
End: 2019-10-28

## 2019-10-28 VITALS
BODY MASS INDEX: 32.33 KG/M2 | SYSTOLIC BLOOD PRESSURE: 132 MMHG | OXYGEN SATURATION: 97 % | DIASTOLIC BLOOD PRESSURE: 73 MMHG | RESPIRATION RATE: 17 BRPM | WEIGHT: 260 LBS | HEART RATE: 56 BPM | TEMPERATURE: 97.2 F | HEIGHT: 75 IN

## 2019-10-28 DIAGNOSIS — M54.32 SCIATICA OF LEFT SIDE: Primary | ICD-10-CM

## 2019-10-28 RX ORDER — ATORVASTATIN CALCIUM 80 MG/1
80 TABLET, FILM COATED ORAL DAILY
COMMUNITY
End: 2020-06-02 | Stop reason: SDUPTHER

## 2019-10-28 RX ORDER — TELMISARTAN 40 MG/1
40 TABLET ORAL DAILY
COMMUNITY
End: 2020-01-21 | Stop reason: SDUPTHER

## 2019-10-28 RX ORDER — DEXAMETHASONE 1 MG/1
TABLET ORAL
Qty: 46 TAB | Refills: 0 | Status: SHIPPED | OUTPATIENT
Start: 2019-10-28 | End: 2019-12-18 | Stop reason: ALTCHOICE

## 2019-10-28 RX ORDER — ASPIRIN 81 MG/1
TABLET ORAL DAILY
COMMUNITY

## 2019-10-28 NOTE — PROGRESS NOTES
HISTORY OF PRESENT ILLNESS  Mary Jane Hook is a 68 y.o. male. 68-year-old male who reports left leg pain radiating from the buttock x ~ 1 month    Leg Pain    The history is provided by the patient and medical records. Mr#: [id number]      Patient Active Problem List   Diagnosis Code    Type 2 diabetes mellitus, without long-term current use of insulin (HCC) E11.9    Hypercholesterolemia E78.00    Arthritis M19.90    Acid reflux K21.9    Restless leg syndrome G25.81    Multilevel degenerative disc disease M53.9    Hypogonadism male E29.1    NELSON (obstructive sleep apnea) G47.33    Essential hypertension I10    BMI 33.0-33.9,adult Z68.33    S/P AAA repair Z98.890, Z86.79    Decreased GFR R94.4    Chronic seasonal allergic rhinitis due to pollen J30.1    Tinnitus of both ears H93.13    Avascular necrosis of femoral head ([facility]) M87.059    Aortic root dilation ([facility]) I77.810    Thoracic compression fracture ([facility]) S22.000A    Bone lesion left femur M89.9    Diabetic polyneuropathy associated with type 2 diabetes mellitus (HCC) E11.42    Symptomatic bradycardia R00.1    Coronary artery disease involving native coronary artery of native heart without angina pectoris I25.10    Constipation K59.00    History of colonoscopy with polypectomy Z98.890, Z86.010         Current Outpatient Medications:     glipiZIDE (GLUCOTROL) 5 mg tablet, Take 1 Tab by mouth two (2) times a day., Disp: 180 Tab, Rfl: 3    metFORMIN ER (GLUCOPHAGE XR) 750 mg tablet, Take 1 Tab by mouth two (2) times a day., Disp: 180 Tab, Rfl: 3    varicella-zoster recombinant, PF, (SHINGRIX, PF,) 50 mcg/0.5 mL susr injection, 0.5mL by IntraMUSCular route once now and then repeat in 2-6 months, Disp: 0.5 mL, Rfl: 1    omeprazole (PRILOSEC) 20 mg capsule, Take 1 Cap by mouth daily. , Disp: 90 Cap, Rfl: 3    Blood-Glucose Meter misc, Uncontrolled DM2 E11.65. BID testing.   Disp 1 meter, Disp: 1 Each, Rfl: 0    glucose blood VI test strips (BLOOD GLUCOSE TEST) strip, Uncontrolled DM2 E11.65. BID testing. Disp 2 boxes of 100 each with 3 refills for 90 day supply. , Disp: 200 Strip, Rfl: 3    Lancets misc, Uncontrolled DM2. BID testing. Disp 2 boxes of 100 each with 3 refills for 90 day supply. , Disp: 2 Package, Rfl: 3    Azelastine (ASTEPRO) 0.15 % (205.5 mcg) nasal spray, two (2) times a day., Disp: , Rfl:     ALPRAZolam (XANAX) 0.25 mg tablet, Take 1 Tab by mouth two (2) times daily as needed for Anxiety. Max Daily Amount: 0.5 mg. Indications: anxiety, Disp: 90 Tab, Rfl: 1    naproxen (NAPROSYN) 500 mg tablet, Take 1 Tab by mouth two (2) times daily (with meals). Take 1 Tab by Mouth Twice Daily. , Disp: 180 Tab, Rfl: 1    glucose blood VI test strips (FREESTYLE LITE STRIPS) strip, ICD10 E11.65 - DM2 daily BG testing., Disp: 100 Strip, Rfl: 3    Diabetic Supplies, Miscellan. (LANCING DEVICE WITH LANCETS) misc, DM2 uncontrolled - BG testing BID., Disp: 1 Each, Rfl: 0    olopatadine (PATANOL) 0.1 % ophthalmic solution, Administer 2 Drops to both eyes two (2) times daily as needed for Allergies. , Disp: 15 mL, Rfl: 3    triamcinolone (ARISTOCORT) 0.5 % topical cream, Apply  to affected area two (2) times a day. use thin layer to affected area, Disp: 15 g, Rfl: 0    omega-3 fatty acids-vitamin e (FISH OIL) 1,000 mg cap, Take 1 Cap by mouth two (2) times a day., Disp: 180 Cap, Rfl: 3    carboxymethylcellulose sodium 1 % DLGl, Apply  to eye., Disp: , Rfl:      Allergies   Allergen Reactions    Zocor [Simvastatin] Myalgia     Tolerating high dose Atorvastatin         Review of Systems   Musculoskeletal:        Left buttock pain radiates to anterior and posterior thigh     Visit Vitals  /73   Pulse (!) 56   Temp 97.2 °F (36.2 °C) (Oral)   Resp 17   Ht 6' 3\" (1.905 m)   Wt 260 lb (117.9 kg)   SpO2 97%   BMI 32.50 kg/m²       Physical Exam   Constitutional: He is oriented to person, place, and time.  He appears well-developed and well-nourished. HENT:   Head: Normocephalic. Eyes: EOM are normal.   Neck: Neck supple. Cardiovascular: Normal rate, regular rhythm and normal heart sounds. Pulmonary/Chest: Effort normal and breath sounds normal.   Musculoskeletal: He exhibits no edema. Back exam reveals no tenderness to palpation, negative straight leg raise and AGNES bilaterally, LE muscle strength grossly intact and symmetrical     Neurological: He is alert and oriented to person, place, and time. Skin: Skin is warm and dry. Psychiatric: He has a normal mood and affect. His behavior is normal.   Nursing note and vitals reviewed.       ASSESSMENT and PLAN    ICD-10-CM ICD-9-CM    1. Sciatica of left side M54.32 724.3 dexAMETHasone (DECADRON) 1 mg tablet   Dexamethasone 1.0 mg - 6 tablets daily in AM with food x 4 days, 4 tablets daily in AM with food x 4 days, 2 tablets daily in AM with food x 2 days, 1 tablet daily in AM with food x 2 days, then stop  Avoid painful activity  Follow exercise instructions  Follow-up for new symptoms, worsening symptoms or failure to improve

## 2019-10-28 NOTE — PATIENT INSTRUCTIONS
Dexamethasone 1.0 mg - 6 tablets daily in AM with food x 4 days, 4 tablets daily in AM with food x 4 days, 2 tablets daily in AM with food x 2 days, 1 tablet daily in AM with food x 2 days, then stop Avoid painful activity Follow exercise instructions Follow-up for new symptoms, worsening symptoms or failure to improve Sciatica: Exercises Introduction Here are some examples of typical rehabilitation exercises for your condition. Start each exercise slowly. Ease off the exercise if you start to have pain. Your doctor or physical therapist will tell you when you can start these exercises and which ones will work best for you. When you are not being active, find a comfortable position for rest. Some people are comfortable on the floor or a medium-firm bed with a small pillow under their head and another under their knees. Some people prefer to lie on their side with a pillow between their knees. Don't stay in one position for too long. Take short walks (10 to 20 minutes) every 2 to 3 hours. Avoid slopes, hills, and stairs until you feel better. Walk only distances you can manage without pain, especially leg pain. How to do the exercises Back stretches 1. Get down on your hands and knees on the floor. 2. Relax your head and allow it to droop. Round your back up toward the ceiling until you feel a nice stretch in your upper, middle, and lower back. Hold this stretch for as long as it feels comfortable, or about 15 to 30 seconds. 3. Return to the starting position with a flat back while you are on your hands and knees. 4. Let your back sway by pressing your stomach toward the floor. Lift your buttocks toward the ceiling. 5. Hold this position for 15 to 30 seconds. 6. Repeat 2 to 4 times. Follow-up care is a key part of your treatment and safety.  Be sure to make and go to all appointments, and call your doctor if you are having problems. It's also a good idea to know your test results and keep a list of the medicines you take. Where can you learn more? Go to http://zuleima-randa.info/. Enter F179 in the search box to learn more about \"Sciatica: Exercises. \" Current as of: June 26, 2019 Content Version: 12.2 © 4172-1310 [employer]. Care instructions adapted under license by [employer] (which disclaims liability or warranty for this information). If you have questions about a medical condition or this instruction, always ask your healthcare professional. [place] [address] any warranty or liability for your use of this information.

## 2019-10-28 NOTE — PROGRESS NOTES
Chief Complaint   Patient presents with    Leg Pain     runs from the butt cheek down into the leg   1. Have you been to the ER, urgent care clinic since your last visit? Hospitalized since your last visit? No    2. Have you seen or consulted any other health care providers outside of the [address] [place] [address] since your last visit? Include any pap smears or colon screening.  No

## 2019-11-26 ENCOUNTER — TELEPHONE (OUTPATIENT)
Dept: FAMILY MEDICINE CLINIC | Age: 69
End: 2019-11-26

## 2019-11-26 NOTE — TELEPHONE ENCOUNTER
Patient called back stating that his left leg is still very painful, patient states that he thinks that he may need a MRI. Patient states that he has taken all of the medication that was prescribed and it's still very painful. Please advise.

## 2019-11-26 NOTE — TELEPHONE ENCOUNTER
Spoke w/pt and he is scheduled for 12/18/2019 at 3:30; pt will call on 12/2/2019 for any cancellations.

## 2019-12-18 ENCOUNTER — OFFICE VISIT (OUTPATIENT)
Dept: FAMILY MEDICINE CLINIC | Age: 69
End: 2019-12-18

## 2019-12-18 VITALS
WEIGHT: 258 LBS | RESPIRATION RATE: 16 BRPM | HEIGHT: 75 IN | OXYGEN SATURATION: 96 % | BODY MASS INDEX: 32.08 KG/M2 | SYSTOLIC BLOOD PRESSURE: 138 MMHG | DIASTOLIC BLOOD PRESSURE: 82 MMHG | TEMPERATURE: 98 F | HEART RATE: 64 BPM

## 2019-12-18 DIAGNOSIS — M53.9 MULTILEVEL DEGENERATIVE DISC DISEASE: ICD-10-CM

## 2019-12-18 DIAGNOSIS — M75.41 IMPINGEMENT SYNDROME OF RIGHT SHOULDER: ICD-10-CM

## 2019-12-18 DIAGNOSIS — E78.00 HYPERCHOLESTEROLEMIA: ICD-10-CM

## 2019-12-18 DIAGNOSIS — E11.42 TYPE 2 DIABETES MELLITUS WITH DIABETIC POLYNEUROPATHY, WITHOUT LONG-TERM CURRENT USE OF INSULIN (HCC): ICD-10-CM

## 2019-12-18 DIAGNOSIS — M54.32 SCIATICA OF LEFT SIDE: Primary | ICD-10-CM

## 2019-12-18 LAB — HBA1C MFR BLD HPLC: 8.1 %

## 2019-12-18 RX ORDER — CELECOXIB 200 MG/1
CAPSULE ORAL
Qty: 60 CAP | Refills: 2 | Status: SHIPPED | OUTPATIENT
Start: 2019-12-18 | End: 2020-02-26

## 2019-12-18 RX ORDER — METFORMIN HYDROCHLORIDE 1000 MG/1
1000 TABLET ORAL 2 TIMES DAILY WITH MEALS
Qty: 180 TAB | Refills: 3 | Status: SHIPPED | OUTPATIENT
Start: 2019-12-18 | End: 2020-07-02 | Stop reason: SDUPTHER

## 2019-12-18 NOTE — PROGRESS NOTES
Stacy Escobar is a 68 y.o. male (: 1950) presenting to address:    Chief Complaint   Patient presents with    Shoulder Pain     c/o right shoulder pain; taking xtra strength Tylenol for some relief; received flu vaccine       Vitals:    19 1509   BP: 138/82   Pulse: 64   Resp: 16   Temp: 98 °F (36.7 °C)   TempSrc: Oral   SpO2: 96%   Weight: 258 lb (117 kg)   Height: 6' 3\" (1.905 m)   PainSc:   0 - No pain       Hearing/Vision:   No exam data present    Learning Assessment:     Learning Assessment 2019   PRIMARY LEARNER Patient   HIGHEST LEVEL OF EDUCATION - PRIMARY LEARNER  SOME COLLEGE   BARRIERS PRIMARY LEARNER NONE   CO-LEARNER CAREGIVER No   PRIMARY LANGUAGE ENGLISH    NEED No   LEARNER PREFERENCE PRIMARY DEMONSTRATION   ANSWERED BY patient   RELATIONSHIP SELF     Depression Screening:     3 most recent PHQ Screens 2019   Little interest or pleasure in doing things Not at all   Feeling down, depressed, irritable, or hopeless Not at all   Total Score PHQ 2 0     Fall Risk Assessment:     Fall Risk Assessment, last 12 mths 2019   Able to walk? Yes   Fall in past 12 months? No   Fall with injury? -   Number of falls in past 12 months -   Fall Risk Score -     Abuse Screening:     Abuse Screening Questionnaire 2019   Do you ever feel afraid of your partner? N   Are you in a relationship with someone who physically or mentally threatens you? N   Is it safe for you to go home? Y     Coordination of Care Questionaire:   1. Have you been to the ER, urgent care clinic since your last visit? Hospitalized since your last visit? NO    2. Have you seen or consulted any other health care providers outside of the [address] [place] [address] since your last visit? Include any pap smears or colon screening. NO    Advanced Directive:   1. Do you have an Advanced Directive? NO    2. Would you like information on Advanced Directives?  NO

## 2019-12-18 NOTE — PROGRESS NOTES
HISTORY OF PRESENT ILLNESS  Didier Faria is a 68 y.o. male. Mr. Ayla Jenkins was seen on 10/28/2019 with a one-month history of right lumbar pain radiating in a radicular pattern down the left leg. He was treated with a tapering course of dexamethasone and now reports that that provided no relief. Since then he is developed pain in the right shoulder which is episodic and exacerbated by certain movements. LOW BACK PAIN   The history is provided by the patient and medical records. This is a chronic problem. Pertinent negatives include no chest pain, no abdominal pain and no shortness of breath. Shoulder Pain    The history is provided by the patient and medical records. The incident occurred more than 1 week ago (Specific incident). Incident location: Not applicable. The injury mechanism is unknown. The right shoulder is affected. Pertinent negatives include no tingling.      Mr#: [id number]      Patient Active Problem List   Diagnosis Code    Type 2 diabetes mellitus, without long-term current use of insulin [facility] E11.9    Hypercholesterolemia E78.00    Arthritis M19.90    Acid reflux K21.9    Restless leg syndrome G25.81    Multilevel degenerative disc disease M53.9    Hypogonadism male E29.1    NELSON (obstructive sleep apnea) G47.33    Essential hypertension I10    BMI 33.0-33.9,adult Z68.33    S/P AAA repair Z98.890, Z86.79    Decreased GFR R94.4    Chronic seasonal allergic rhinitis due to pollen J30.1    Tinnitus of both ears H93.13    Thoracic compression fracture (HCC) S22.000A    Bone lesion left femur M89.9    Diabetic polyneuropathy associated with type 2 diabetes mellitus (HCC) E11.42    Symptomatic bradycardia R00.1    Coronary artery disease involving native coronary artery of native heart without angina pectoris I25.10    Constipation K59.00    History of colonoscopy with polypectomy Z98.890, Z86.010         Current Outpatient Medications:     telmisartan (MICARDIS) 40 mg tablet, Take 40 mg by mouth daily. , Disp: , Rfl:     aspirin delayed-release 81 mg tablet, Take  by mouth daily. , Disp: , Rfl:     atorvastatin (LIPITOR) 80 mg tablet, Take 80 mg by mouth daily. , Disp: , Rfl:     TURMERIC PO, Take  by mouth., Disp: , Rfl:     glipiZIDE (GLUCOTROL) 5 mg tablet, Take 1 Tab by mouth two (2) times a day., Disp: 180 Tab, Rfl: 3    metFORMIN ER (GLUCOPHAGE XR) 750 mg tablet, Take 1 Tab by mouth two (2) times a day., Disp: 180 Tab, Rfl: 3    Blood-Glucose Meter misc, Uncontrolled DM2 E11.65. BID testing. Disp 1 meter, Disp: 1 Each, Rfl: 0    glucose blood VI test strips (BLOOD GLUCOSE TEST) strip, Uncontrolled DM2 E11.65. BID testing. Disp 2 boxes of 100 each with 3 refills for 90 day supply. , Disp: 200 Strip, Rfl: 3    Lancets misc, Uncontrolled DM2. BID testing. Disp 2 boxes of 100 each with 3 refills for 90 day supply. , Disp: 2 Package, Rfl: 3    Azelastine (ASTEPRO) 0.15 % (205.5 mcg) nasal spray, two (2) times a day., Disp: , Rfl:     glucose blood VI test strips (FREESTYLE LITE STRIPS) strip, ICD10 E11.65 - DM2 daily BG testing., Disp: 100 Strip, Rfl: 3    Diabetic Supplies, Miscellan. (LANCING DEVICE WITH LANCETS) misc, DM2 uncontrolled - BG testing BID., Disp: 1 Each, Rfl: 0    olopatadine (PATANOL) 0.1 % ophthalmic solution, Administer 2 Drops to both eyes two (2) times daily as needed for Allergies. , Disp: 15 mL, Rfl: 3    triamcinolone (ARISTOCORT) 0.5 % topical cream, Apply  to affected area two (2) times a day. use thin layer to affected area, Disp: 15 g, Rfl: 0    omega-3 fatty acids-vitamin e (FISH OIL) 1,000 mg cap, Take 1 Cap by mouth two (2) times a day., Disp: 180 Cap, Rfl: 3    carboxymethylcellulose sodium 1 % DLGl, Apply  to eye., Disp: , Rfl:      Allergies   Allergen Reactions    Zocor [Simvastatin] Myalgia     Tolerating high dose Atorvastatin       Review of Systems   Constitutional: Negative for fever and weight loss.    Eyes: Negative for blurred vision. Respiratory: Negative for shortness of breath. Cardiovascular: Negative for chest pain and palpitations. Gastrointestinal: Negative for abdominal pain. Genitourinary: Negative for frequency. Musculoskeletal: Positive for back pain ( Lumbar pain, chronic) and joint pain (right shoulder, left hip). Negative for falls. Neurological: Negative for tingling and focal weakness. Endo/Heme/Allergies: Negative for polydipsia. Visit Vitals  /82 (BP 1 Location: Left arm, BP Patient Position: Sitting)   Pulse 64   Temp 98 °F (36.7 °C) (Oral)   Resp 16   Ht 6' 3\" (1.905 m)   Wt 258 lb (117 kg)   SpO2 96%   BMI 32.25 kg/m²       Physical Exam  Vitals signs and nursing note reviewed. Constitutional:       Appearance: He is well-developed. HENT:      Head: Normocephalic. Neck:      Musculoskeletal: Neck supple. Cardiovascular:      Rate and Rhythm: Normal rate and regular rhythm. Heart sounds: Normal heart sounds. Pulmonary:      Effort: Pulmonary effort is normal.      Breath sounds: Normal breath sounds. Musculoskeletal:      Comments: Back exam reveals no tenderness to palpation, negative straight leg raise and AGNES bilaterally, LE muscle strength grossly intact and symmetrical    Right shoulder non tender with full range of motion with discomfort with internal rotation while in abduction. Rotator cuff muscle group strength appears to be intact against resistance. Skin:     General: Skin is warm and dry. Neurological:      Mental Status: He is alert and oriented to person, place, and time. Psychiatric:         Behavior: Behavior normal.       Results for Kika Thomas (MRN [id number]) as of 12/18/2019 16:48   Ref. Range 3/20/2019 07:35 8/27/2019 08:25 8/27/2019 09:36 12/18/2019 15:30   Hemoglobin A1c, (calculated) Latest Ref Range: 4.2 - 5.6 % 7.7 (H)      Hemoglobin A1c (POC) Latest Units: %  7.2  8.1     ASSESSMENT and PLAN    ICD-10-CM ICD-9-CM    1. Sciatica of left side M54.32 724.3 celecoxib (CELEBREX) 200 mg capsule   2. Multilevel degenerative disc disease M53.9 722.6 celecoxib (CELEBREX) 200 mg capsule   3. Type 2 diabetes mellitus with diabetic polyneuropathy, without long-term current use of insulin (HCC) E11.42 250.60 AMB POC HEMOGLOBIN A1C     357.2 metFORMIN (GLUCOPHAGE) 1,000 mg tablet   4. Hypercholesterolemia E78.00 272.0    5.  Impingement syndrome of right shoulder M75.41 726.2 celecoxib (CELEBREX) 200 mg capsule   Worsening diabetic control  Increase metformin to 1000 mg twice daily with food  Begin Celebrex 200 mg twice daily with food for shoulder and hip pain  Follow rotator cuff exercise instructions  Avoid dietary starch and sugar  Follow-up in February as planned, sooner with any problems  Consider physical therapy/physiatry/sports medicine referral if not improved

## 2019-12-18 NOTE — PATIENT INSTRUCTIONS
Increase metformin to 1000 mg twice daily with food Begin Celebrex 200 mg twice daily with food for shoulder and hip pain Avoid dietary starch and sugar Follow-up in February as planned, sooner with any problems Rotator Cuff: Exercises Introduction Here are some examples of exercises for you to try. The exercises may be suggested for a condition or for rehabilitation. Start each exercise slowly. Ease off the exercises if you start to have pain. You will be told when to start these exercises and which ones will work best for you. How to do the exercises Pendulum swing 1. Hold on to a table or the back of a chair with your good arm. Then bend forward a little and let your sore arm hang straight down. This exercise does not use the arm muscles. Rather, use your legs and your hips to create movement that makes your arm swing freely. 2. Use the movement from your hips and legs to guide the slightly swinging arm back and forth like a pendulum (or elephant trunk). Then guide it in circles that start small (about the size of a dinner plate). Make the circles a bit larger each day, as your pain allows. 3. Do this exercise for 5 minutes, 5 to 7 times each day. 4. As you have less pain, try bending over a little farther to do this exercise. This will increase the amount of movement at your shoulder. Posterior stretching exercise 1. Hold the elbow of your injured arm with your other hand. 2. Use your hand to pull your injured arm gently up and across your body. You will feel a gentle stretch across the back of your injured shoulder. 3. Hold for at least 15 to 30 seconds. Then slowly lower your arm. 4. Repeat 2 to 4 times. Up-the-back stretch 1. Put your hand in your back pocket. Let it rest there to stretch your shoulder. 2. With your other hand, hold your injured arm (palm outward) behind your back by the wrist. Pull your arm up gently to stretch your shoulder. 3. Next, put a towel over your other shoulder. Put the hand of your injured arm behind your back. Now hold the back end of the towel. With the other hand, hold the front end of the towel in front of your body. Pull gently on the front end of the towel. This will bring your hand farther up your back to stretch your shoulder. Overhead stretch 1. Standing about an arm's length away, grasp onto a solid surface. You could use a countertop, a doorknob, or the back of a sturdy chair. 2. With your knees slightly bent, bend forward with your arms straight. Lower your upper body, and let your shoulders stretch. 3. As your shoulders are able to stretch farther, you may need to take a step or two backward. 4. Hold for at least 15 to 30 seconds. Then stand up and relax. If you had stepped back during your stretch, step forward so you can keep your hands on the solid surface. 5. Repeat 2 to 4 times. Shoulder flexion (lying down) 1. Lie on your back, holding a wand with both hands. Your palms should face down as you hold the wand. 2. Keeping your elbows straight, slowly raise your arms over your head. Raise them until you feel a stretch in your shoulders, upper back, and chest. 
3. Hold for 15 to 30 seconds. 4. Repeat 2 to 4 times. Shoulder rotation (lying down) 1. Lie on your back. Hold a wand with both hands with your elbows bent and palms up. 2. Keep your elbows close to your body, and move the wand across your body toward the sore arm. 3. Hold for 8 to 12 seconds. 4. Repeat 2 to 4 times. Wall climbing (to the side) 1. Stand with your side to a wall so that your fingers can just touch it at an angle about 30 degrees toward the front of your body. 2. Walk the fingers of your injured arm up the wall as high as pain permits. Try not to shrug your shoulder up toward your ear as you move your arm up. 3. Hold that position for a count of at least 15 to 20. 4. Walk your fingers back down to the starting position. 5. Repeat at least 2 to 4 times. Try to reach higher each time. Wall climbing (to the front) 1. Face a wall, and stand so your fingers can just touch it. 2. Keeping your shoulder down, walk the fingers of your injured arm up the wall as high as pain permits. (Don't shrug your shoulder up toward your ear.) 3. Hold your arm in that position for at least 15 to 30 seconds. 4. Slowly walk your fingers back down to where you started. 5. Repeat at least 2 to 4 times. Try to reach higher each time. Shoulder blade squeeze 1. Stand with your arms at your sides, and squeeze your shoulder blades together. Do not raise your shoulders up as you squeeze. 2. Hold 6 seconds. 3. Repeat 8 to 12 times. Scapular exercise: Arm reach 1. Lie flat on your back. This exercise is a very slight motion that starts with your arms raised (elbows straight, arms straight). 2. From this position, reach higher toward the sabrina or ceiling. Keep your elbows straight. All motion should be from your shoulder blade only. 3. Relax your arms back to where you started. 4. Repeat 8 to 12 times. Arm raise to the side 1. Slowly raise your injured arm to the side, with your thumb facing up. Raise your arm 60 degrees at the most (shoulder level is 90 degrees). 2. Hold the position for 3 to 5 seconds. Then lower your arm back to your side. If you need to, bring your \"good\" arm across your body and place it under the elbow as you lower your injured arm. Use your good arm to keep your injured arm from dropping down too fast. 
3. Repeat 8 to 12 times. 4. When you first start out, don't hold any extra weight in your hand. As you get stronger, you may use a 1-pound to 2-pound dumbbell or a small can of food. Shoulder flexor and extensor exercise 1.  Push forward (flex): Stand facing a wall or doorjamb, about 6 inches or less back. Hold your injured arm against your body. Make a closed fist with your thumb on top. Then gently push your hand forward into the wall with about 25% to 50% of your strength. Don't let your body move backward as you push. Hold for about 6 seconds. Relax for a few seconds. Repeat 8 to 12 times. 2. Push backward (extend): Stand with your back flat against a wall. Your upper arm should be against the wall, with your elbow bent 90 degrees (your hand straight ahead). Push your elbow gently back against the wall with about 25% to 50% of your strength. Don't let your body move forward as you push. Hold for about 6 seconds. Relax for a few seconds. Repeat 8 to 12 times. Scapular exercise: Wall push-ups 1. Stand facing a wall, about 12 inches to 18 inches away. 2. Place your hands on the wall at shoulder height. 3. Slowly bend your elbows and bring your face to the wall. Keep your back and hips straight. 4. Push back to where you started. 5. Repeat 8 to 12 times. 6. When you can do this exercise against a wall comfortably, you can try it against a counter. You can then slowly progress to the end of a couch, then to a sturdy chair, and finally to the floor. Scapular exercise: Retraction 1. Put the band around a solid object at about waist level. (A bedpost will work well.) Each hand should hold an end of the band. 2. With your elbows at your sides and bent to 90 degrees, pull the band back. Your shoulder blades should move toward each other. Then move your arms back where you started. 3. Repeat 8 to 12 times. 4. If you have good range of motion in your shoulders, try this exercise with your arms lifted out to the sides. Keep your elbows at a 90-degree angle. Raise the elastic band up to about shoulder level. Pull the band back to move your shoulder blades toward each other. Then move your arms back where you started. Internal rotator strengthening exercise 1. Start by tying a piece of elastic exercise material to a doorknob. You can use surgical tubing or Thera-Band. 2. Stand or sit with your shoulder relaxed and your elbow bent 90 degrees. Your upper arm should rest comfortably against your side. Squeeze a rolled towel between your elbow and your body for comfort. This will help keep your arm at your side. 3. Hold one end of the elastic band in the hand of the painful arm. 4. Slowly rotate your forearm toward your body until it touches your belly. Slowly move it back to where you started. 5. Keep your elbow and upper arm firmly tucked against the towel roll or at your side. 6. Repeat 8 to 12 times. External rotator strengthening exercise 1. Start by tying a piece of elastic exercise material to a doorknob. You can use surgical tubing or Thera-Band. (You may also hold one end of the band in each hand.) 2. Stand or sit with your shoulder relaxed and your elbow bent 90 degrees. Your upper arm should rest comfortably against your side. Squeeze a rolled towel between your elbow and your body for comfort. This will help keep your arm at your side. 3. Hold one end of the elastic band with the hand of the painful arm. 4. Start with your forearm across your belly. Slowly rotate the forearm out away from your body. Keep your elbow and upper arm tucked against the towel roll or the side of your body until you begin to feel tightness in your shoulder. Slowly move your arm back to where you started. 5. Repeat 8 to 12 times. Follow-up care is a key part of your treatment and safety. Be sure to make and go to all appointments, and call your doctor if you are having problems. It's also a good idea to know your test results and keep a list of the medicines you take. Where can you learn more? Go to http://zuleima-randa.info/. Enter Mario Araujo in the search box to learn more about \"Rotator Cuff: Exercises. \" Current as of: June 26, 2019 Content Version: 12.2 © 2432-5305 [employer]. Care instructions adapted under license by [employer] (which disclaims liability or warranty for this information). If you have questions about a medical condition or this instruction, always ask your healthcare professional. [address] any warranty or liability for your use of this information.

## 2020-01-21 NOTE — TELEPHONE ENCOUNTER
Last seen: 12/18/2019  Next O/V: 2/26/2020  Last filled: micardis 40 mg; glucose test strips; qty 200 w/3 refills

## 2020-01-21 NOTE — TELEPHONE ENCOUNTER
Requested Prescriptions     Pending Prescriptions Disp Refills    telmisartan (MICARDIS) 40 mg tablet       Sig: Take 1 Tab by mouth daily.  glucose blood VI test strips (FREESTYLE LITE STRIPS) strip 100 Strip 3     Sig: ICD10 E11.65 - DM2 daily BG testing.

## 2020-01-22 RX ORDER — TELMISARTAN 40 MG/1
40 TABLET ORAL DAILY
Qty: 90 TAB | Refills: 3 | Status: SHIPPED | OUTPATIENT
Start: 2020-01-22 | End: 2020-04-27 | Stop reason: SDUPTHER

## 2020-02-25 NOTE — PROGRESS NOTES
HISTORY OF PRESENT ILLNESS  Edi Torres is a 68 y.o. male. Mr. Luz Maria Bishop returns for follow-up of type 2 diabetes with hypertension, hyperlipidemia, coronary artery disease, right shoulder impingement syndrome, multilevel lumbar degenerative joint disease with left-sided radicular symptoms and obesity. He was seen on 12/18/2019 with these problems and it was noted that his hemoglobin A1c had risen to 8.1%. He was advised to Increase metformin to 1000 mg twice daily with food  Begin Celebrex 200 mg twice daily with food for shoulder and hip pain  Follow rotator cuff exercise instructions  Avoid dietary starch and sugar  Follow-up in February as planned, sooner with any problems  Consider physical therapy/physiatry/sports medicine referral if not improved    Mr#: [id number]      Patient Active Problem List   Diagnosis Code    Type 2 diabetes mellitus, without long-term current use of insulin ([facility]) E11.9    Hypercholesterolemia E78.00    Arthritis M19.90    Acid reflux K21.9    Restless leg syndrome G25.81    Multilevel degenerative disc disease M53.9    Hypogonadism male E29.1    NELSON (obstructive sleep apnea) G47.33    Essential hypertension I10    BMI 33.0-33.9,adult Z68.33    S/P AAA repair Z98.890, Z86.79    Decreased GFR R94.4    Chronic seasonal allergic rhinitis due to pollen J30.1    Tinnitus of both ears H93.13    Thoracic compression fracture ([facility]) S22.000A    Bone lesion left femur M89.9    Diabetic polyneuropathy associated with type 2 diabetes mellitus ([facility]) E11.42    Symptomatic bradycardia R00.1    Coronary artery disease involving native coronary artery of native heart without angina pectoris I25.10    Constipation K59.00    History of colonoscopy with polypectomy Z98.890, Z86.010         Current Outpatient Medications:     telmisartan (MICARDIS) 40 mg tablet, Take 1 Tab by mouth daily. , Disp: 90 Tab, Rfl: 3    glucose blood VI test strips (FREESTYLE LITE STRIPS) strip, ICD10 E11.65 - DM2 daily BG testing., Disp: 100 Strip, Rfl: 3    metFORMIN (GLUCOPHAGE) 1,000 mg tablet, Take 1 Tab by mouth two (2) times daily (with meals). , Disp: 180 Tab, Rfl: 3    aspirin delayed-release 81 mg tablet, Take  by mouth daily. , Disp: , Rfl:     atorvastatin (LIPITOR) 80 mg tablet, Take 80 mg by mouth daily. , Disp: , Rfl:     TURMERIC PO, Take  by mouth., Disp: , Rfl:     Blood-Glucose Meter misc, Uncontrolled DM2 E11.65. BID testing. Disp 1 meter, Disp: 1 Each, Rfl: 0    glucose blood VI test strips (BLOOD GLUCOSE TEST) strip, Uncontrolled DM2 E11.65. BID testing. Disp 2 boxes of 100 each with 3 refills for 90 day supply. , Disp: 200 Strip, Rfl: 3    Lancets misc, Uncontrolled DM2. BID testing. Disp 2 boxes of 100 each with 3 refills for 90 day supply. , Disp: 2 Package, Rfl: 3    Azelastine (ASTEPRO) 0.15 % (205.5 mcg) nasal spray, two (2) times a day., Disp: , Rfl:     Diabetic Supplies, Sabrina Sosa (LANCING DEVICE WITH LANCETS) misc, DM2 uncontrolled - BG testing BID., Disp: 1 Each, Rfl: 0    olopatadine (PATANOL) 0.1 % ophthalmic solution, Administer 2 Drops to both eyes two (2) times daily as needed for Allergies. , Disp: 15 mL, Rfl: 3    triamcinolone (ARISTOCORT) 0.5 % topical cream, Apply  to affected area two (2) times a day. use thin layer to affected area, Disp: 15 g, Rfl: 0    omega-3 fatty acids-vitamin e (FISH OIL) 1,000 mg cap, Take 1 Cap by mouth two (2) times a day., Disp: 180 Cap, Rfl: 3    carboxymethylcellulose sodium 1 % DLGl, Apply  to eye., Disp: , Rfl:      Allergies   Allergen Reactions    Zocor [Simvastatin] Myalgia       Review of Systems   Constitutional: Negative for fever. Eyes: Negative for blurred vision. Respiratory: Negative for shortness of breath. Cardiovascular: Negative for chest pain and palpitations. Gastrointestinal: Negative for abdominal pain.    Musculoskeletal: Positive for back pain ( Left lumbar pain and hip pain have improved dramatically, the patient has stopped taking Celebrex which causes constipation he has changed the chair that he sits and when hunting and this seems to have made a difference) and joint pain ( Right shoulder much improved, now able to fully extend with minimal symptoms ). Neurological: Positive for sensory change ( Diabetic neuropathy). Endo/Heme/Allergies: Negative for polydipsia. Visit Vitals  /80 (BP 1 Location: Left arm, BP Patient Position: Sitting)   Pulse 65   Temp 97.6 °F (36.4 °C) (Oral)   Resp 16   Ht 6' 3\" (1.905 m)   Wt 249 lb 3.2 oz (113 kg)   SpO2 97%   BMI 31.15 kg/m²       Physical Exam  Vitals signs and nursing note reviewed. Constitutional:       Appearance: Normal appearance. He is well-developed. He is obese. Comments: 9 pound intentional weight loss since 12/18/2019   HENT:      Head: Normocephalic. Eyes:      Extraocular Movements: Extraocular movements intact. Neck:      Musculoskeletal: Neck supple. Cardiovascular:      Rate and Rhythm: Normal rate and regular rhythm. Heart sounds: Normal heart sounds. Pulmonary:      Effort: Pulmonary effort is normal.      Breath sounds: Normal breath sounds. Musculoskeletal:      Right lower leg: No edema. Left lower leg: No edema. Comments: Demonstrates full extension of the right shoulder without discomfort   Skin:     General: Skin is warm and dry. Neurological:      Mental Status: He is alert and oriented to person, place, and time. Psychiatric:         Behavior: Behavior normal.       Results for Arslan Viera (MRN [id number]) as of 2/26/2020 08:13   Ref.  Range 3/20/2019 07:35 8/27/2019 08:25 8/27/2019 09:36 12/18/2019 15:30 2/26/2020 07:55   Triglyceride Latest Ref Range: <150 MG/ (H)  202 (H)     Cholesterol, total Latest Ref Range: <200 MG/  229 (H)     HDL Cholesterol Latest Ref Range: 40 - 60 MG/DL 35 (L)  32 (L)     CHOL/HDL Ratio Latest Ref Range: 0 - 5.0   5.5 (H)  7.2 (H) VLDL, calculated Latest Units: MG/DL 43.2  40.4     LDL, calculated Latest Ref Range: 0 - 100 MG/.8 (H)  156.6 (H)     Hemoglobin A1c, (calculated) Latest Ref Range: 4.2 - 5.6 % 7.7 (H)       Hemoglobin A1c (POC) Latest Units: %  7.2  8.1 7.2   Est. average glucose Latest Units: mg/dL 174       Creatinine, urine Latest Ref Range: 30 - 125 mg/dL   131.00 (H)     Microalbumin,urine random Latest Ref Range: 0 - 3.0 MG/DL   1.36     Microalbumin/Creat. Ratio Latest Ref Range: 0 - 30 mg/g   10       ASSESSMENT and PLAN    ICD-10-CM ICD-9-CM    1. Type 2 diabetes mellitus with diabetic polyneuropathy, without long-term current use of insulin ([facility]) E11.42 250.60 AMB POC HEMOGLOBIN A1C     302.0 METABOLIC PANEL, BASIC   2. Essential hypertension E58 555.4 METABOLIC PANEL, BASIC   3. Hypercholesterolemia E78.00 272.0 LIPID PANEL   4. Coronary artery disease involving native coronary artery of native heart without angina pectoris I25.10 414.01    5. Impingement syndrome of right shoulder M75.41 726.2    6. Multilevel degenerative disc disease M53.9 722.6    7. Sciatica of left side M54.32 724.3    Diabetic control much improved, hypertension remains well controlled, asymptomatic regarding coronary artery disease reports improvement in left hip pain and right shoulder symptoms  Continue excellent progress with avoidance of dietary starch and sugar  BMP, lipid panel today-further disposition pending results of indicated  Continue current medications  Avoid dietary salt, starch and sugar and follow a program of regular aerobic exercise  Schedule follow-up and Medicare wellness evaluation in 6 months with fasting lab several days prior to that appointment. Follow-up sooner with any problems    PLEASE NOTE:   This document has been produced using voice recognition software. Unrecognized errors in transcription may be present.

## 2020-02-26 ENCOUNTER — HOSPITAL ENCOUNTER (OUTPATIENT)
Dept: LAB | Age: 70
Discharge: HOME | End: 2020-02-26
Payer: MEDICARE

## 2020-02-26 ENCOUNTER — OFFICE VISIT (OUTPATIENT)
Dept: FAMILY MEDICINE CLINIC | Age: 70
End: 2020-02-26

## 2020-02-26 VITALS
HEART RATE: 65 BPM | TEMPERATURE: 97.6 F | DIASTOLIC BLOOD PRESSURE: 80 MMHG | OXYGEN SATURATION: 97 % | HEIGHT: 75 IN | BODY MASS INDEX: 30.99 KG/M2 | SYSTOLIC BLOOD PRESSURE: 118 MMHG | RESPIRATION RATE: 16 BRPM | WEIGHT: 249.2 LBS

## 2020-02-26 DIAGNOSIS — I10 ESSENTIAL HYPERTENSION: ICD-10-CM

## 2020-02-26 DIAGNOSIS — E78.00 HYPERCHOLESTEROLEMIA: ICD-10-CM

## 2020-02-26 DIAGNOSIS — E11.42 TYPE 2 DIABETES MELLITUS WITH DIABETIC POLYNEUROPATHY, WITHOUT LONG-TERM CURRENT USE OF INSULIN (HCC): ICD-10-CM

## 2020-02-26 DIAGNOSIS — M54.32 SCIATICA OF LEFT SIDE: ICD-10-CM

## 2020-02-26 DIAGNOSIS — M75.41 IMPINGEMENT SYNDROME OF RIGHT SHOULDER: ICD-10-CM

## 2020-02-26 DIAGNOSIS — E11.42 TYPE 2 DIABETES MELLITUS WITH DIABETIC POLYNEUROPATHY, WITHOUT LONG-TERM CURRENT USE OF INSULIN (HCC): Primary | ICD-10-CM

## 2020-02-26 DIAGNOSIS — M53.9 MULTILEVEL DEGENERATIVE DISC DISEASE: ICD-10-CM

## 2020-02-26 DIAGNOSIS — I25.10 CORONARY ARTERY DISEASE INVOLVING NATIVE CORONARY ARTERY OF NATIVE HEART WITHOUT ANGINA PECTORIS: ICD-10-CM

## 2020-02-26 LAB
ANION GAP SERPL CALC-SCNC: 6 MMOL/L (ref 3–18)
BUN SERPL-MCNC: 13 MG/DL (ref 7–18)
BUN/CREAT SERPL: 13 (ref 12–20)
CALCIUM SERPL-MCNC: 9.1 MG/DL (ref 8.5–10.1)
CHLORIDE SERPL-SCNC: 106 MMOL/L (ref 100–111)
CHOLEST SERPL-MCNC: 113 MG/DL
CO2 SERPL-SCNC: 27 MMOL/L (ref 21–32)
CREAT SERPL-MCNC: 1.02 MG/DL (ref 0.6–1.3)
GLUCOSE SERPL-MCNC: 152 MG/DL (ref 74–99)
HBA1C MFR BLD HPLC: 7.2 %
HDLC SERPL-MCNC: 33 MG/DL (ref 40–60)
HDLC SERPL: 3.4 {RATIO} (ref 0–5)
LDLC SERPL CALC-MCNC: 52.8 MG/DL (ref 0–100)
LIPID PROFILE,FLP: ABNORMAL
POTASSIUM SERPL-SCNC: 4.5 MMOL/L (ref 3.5–5.5)
SODIUM SERPL-SCNC: 139 MMOL/L (ref 136–145)
TRIGL SERPL-MCNC: 136 MG/DL (ref ?–150)
VLDLC SERPL CALC-MCNC: 27.2 MG/DL

## 2020-02-26 PROCEDURE — 80061 LIPID PANEL: CPT

## 2020-02-26 PROCEDURE — 36415 COLL VENOUS BLD VENIPUNCTURE: CPT

## 2020-02-26 PROCEDURE — 80048 BASIC METABOLIC PNL TOTAL CA: CPT

## 2020-02-26 NOTE — PROGRESS NOTES
Kayla Monzon is a 68 y.o. male (: 1950) presenting to address:    Chief Complaint   Patient presents with    Diabetes     received flu vaccine       Vitals:    20 0749   BP: 118/80   Pulse: 65   Resp: 16   Temp: 97.6 °F (36.4 °C)   TempSrc: Oral   SpO2: 97%   Weight: 249 lb 3.2 oz (113 kg)   Height: 6' 3\" (1.905 m)   PainSc:   0 - No pain       Hearing/Vision:   No exam data present    Learning Assessment:     Learning Assessment 2019   PRIMARY LEARNER Patient   HIGHEST LEVEL OF EDUCATION - PRIMARY LEARNER  SOME COLLEGE   BARRIERS PRIMARY LEARNER NONE   CO-LEARNER CAREGIVER No   PRIMARY LANGUAGE ENGLISH    NEED No   LEARNER PREFERENCE PRIMARY DEMONSTRATION   ANSWERED BY patient   RELATIONSHIP SELF     Depression Screening:     3 most recent PHQ Screens 2020   Little interest or pleasure in doing things Not at all   Feeling down, depressed, irritable, or hopeless Not at all   Total Score PHQ 2 0     Fall Risk Assessment:     Fall Risk Assessment, last 12 mths 2020   Able to walk? Yes   Fall in past 12 months? No   Fall with injury? -   Number of falls in past 12 months -   Fall Risk Score -     Abuse Screening:     Abuse Screening Questionnaire 2019   Do you ever feel afraid of your partner? N   Are you in a relationship with someone who physically or mentally threatens you? N   Is it safe for you to go home? Y     Coordination of Care Questionaire:   1. Have you been to the ER, urgent care clinic since your last visit? Hospitalized since your last visit? NO    2. Have you seen or consulted any other health care providers outside of the [address] [place] [address] since your last visit? Include any pap smears or colon screening. NO    Advanced Directive:   1. Do you have an Advanced Directive? NO    2. Would you like information on Advanced Directives?  NO

## 2020-02-26 NOTE — PATIENT INSTRUCTIONS
Continue excellent progress with avoidance of dietary starch and sugar BMP, lipid panel today-further disposition pending results of indicated Continue current medications Avoid dietary salt, starch and sugar and follow a program of regular aerobic exercise Schedule follow-up and Medicare wellness evaluation in 6 months with fasting lab several days prior to that appointment. Follow-up sooner with any problems

## 2020-02-27 NOTE — PROGRESS NOTES
These advise Mr. Marilynn Dyson that his lab results show a dramatic improvement in his cholesterol levels as a result of his diet modification and the atorvastatin 80 mg daily. He should definitely continue the atorvastatin.

## 2020-04-27 RX ORDER — TELMISARTAN 40 MG/1
40 TABLET ORAL DAILY
Qty: 90 TAB | Refills: 3 | Status: CANCELLED | OUTPATIENT
Start: 2020-04-27

## 2020-04-27 RX ORDER — TELMISARTAN 40 MG/1
40 TABLET ORAL DAILY
Qty: 90 TAB | Refills: 3 | Status: SHIPPED | OUTPATIENT
Start: 2020-04-27 | End: 2021-04-12 | Stop reason: SDUPTHER

## 2020-04-27 NOTE — TELEPHONE ENCOUNTER
Patient would like to use mail order pharmacy; [facility] pharmacy has closed for the time being.

## 2020-06-02 ENCOUNTER — TELEPHONE (OUTPATIENT)
Dept: FAMILY MEDICINE CLINIC | Age: 70
End: 2020-06-02

## 2020-06-02 DIAGNOSIS — E78.00 HYPERCHOLESTEROLEMIA: ICD-10-CM

## 2020-06-02 DIAGNOSIS — E78.00 HYPERCHOLESTEROLEMIA: Primary | ICD-10-CM

## 2020-06-02 RX ORDER — ATORVASTATIN CALCIUM 80 MG/1
80 TABLET, FILM COATED ORAL DAILY
Qty: 90 TAB | Refills: 4 | Status: SHIPPED | OUTPATIENT
Start: 2020-06-02 | End: 2020-09-02 | Stop reason: SDUPTHER

## 2020-06-02 RX ORDER — ATORVASTATIN CALCIUM 80 MG/1
80 TABLET, FILM COATED ORAL DAILY
Qty: 90 TAB | Refills: 4 | Status: SHIPPED | OUTPATIENT
Start: 2020-06-02 | End: 2020-06-02 | Stop reason: SDUPTHER

## 2020-06-02 NOTE — TELEPHONE ENCOUNTER
The patient was changed to atorvastatin 80 milligrams daily and subsequent labs showed good control of his cholesterol levels. Please asked why he is requesting a prescription for rosuvastatin.   If he is wanting to change medications then we will need to have a telemedicine appointment

## 2020-06-02 NOTE — TELEPHONE ENCOUNTER
Dr. Damion Cummings, pt wanted the Rx printed so he could pick it up; it was noted in the previous message.

## 2020-06-02 NOTE — TELEPHONE ENCOUNTER
Spoke w/pt and he needs refill of the following medication:    lipitor 80 mg tab; pt would like it printed so he can pick it up.

## 2020-06-02 NOTE — TELEPHONE ENCOUNTER
Patient called requesting a refill on a medication that has been discontinued in his chart. Rosuvastatin (CRESTOR) 40 mg tablet    Patient would like the prescription printed. Does patient need appointment scheduled for refill?

## 2020-07-02 DIAGNOSIS — E11.42 TYPE 2 DIABETES MELLITUS WITH DIABETIC POLYNEUROPATHY, WITHOUT LONG-TERM CURRENT USE OF INSULIN (HCC): ICD-10-CM

## 2020-07-02 RX ORDER — METFORMIN HYDROCHLORIDE 1000 MG/1
1000 TABLET ORAL 2 TIMES DAILY WITH MEALS
Qty: 180 TAB | Refills: 0 | Status: SHIPPED | OUTPATIENT
Start: 2020-07-02 | End: 2020-07-07 | Stop reason: SDUPTHER

## 2020-07-02 NOTE — TELEPHONE ENCOUNTER
Pt calling Due to issues with pharmacy pt is requesting   Requested Prescriptions     Pending Prescriptions Disp Refills    metFORMIN (GLUCOPHAGE) 1,000 mg tablet 180 Tab 3     Sig: Take 1 Tab by mouth two (2) times daily (with meals). Be sent to [facility]    Pt has 1 tabs remaining     Pts last appt was 12/18/19    Future Appointments   Date Time Provider Nora Bullard   8/26/2020  8:00 AM Kassi Callahan MD [facility]     Pt advised of 72 hour time frame. Please advise.

## 2020-07-02 NOTE — TELEPHONE ENCOUNTER
Patient unable [facility]  [facility]

## 2020-07-07 ENCOUNTER — TELEPHONE (OUTPATIENT)
Dept: FAMILY MEDICINE CLINIC | Age: 70
End: 2020-07-07

## 2020-07-07 DIAGNOSIS — E11.42 TYPE 2 DIABETES MELLITUS WITH DIABETIC POLYNEUROPATHY, WITHOUT LONG-TERM CURRENT USE OF INSULIN (HCC): ICD-10-CM

## 2020-07-07 RX ORDER — METFORMIN HYDROCHLORIDE 1000 MG/1
1000 TABLET ORAL 2 TIMES DAILY WITH MEALS
Qty: 180 TAB | Refills: 0 | Status: CANCELLED | OUTPATIENT
Start: 2020-07-07

## 2020-07-07 RX ORDER — METFORMIN HYDROCHLORIDE 1000 MG/1
1000 TABLET ORAL 2 TIMES DAILY WITH MEALS
Qty: 180 TAB | Refills: 0 | Status: SHIPPED | OUTPATIENT
Start: 2020-07-07 | End: 2020-10-14 | Stop reason: SDUPTHER

## 2020-07-07 NOTE — TELEPHONE ENCOUNTER
Patient is requesting for this prescription to be sent to the [facility] selected in this encounter and states he specifically stateded he did NOT want it sent to [facility] during previous request.

## 2020-08-20 ENCOUNTER — VIRTUAL VISIT (OUTPATIENT)
Dept: FAMILY MEDICINE CLINIC | Age: 70
End: 2020-08-20

## 2020-08-20 DIAGNOSIS — K59.09 CHRONIC CONSTIPATION: Primary | ICD-10-CM

## 2020-08-20 NOTE — PATIENT INSTRUCTIONS
Recommend continuing with MiraLAX, 17 g in an 8 ounce beverage daily, sure that this is safe. Continue to increase fluids and dietary fiber. Use OTC stool softeners as needed. Follow-up for new symptoms, worsening symptoms or failure to improve

## 2020-08-20 NOTE — PROGRESS NOTES
Sharee Levi is a 68 y.o. male who was seen by synchronous (real-time) audio-video technology using doxy. me on 8/20/2020. Mr#: [id number]    Consent:  He and/or his healthcare decision maker is aware that this patient-initiated Telehealth encounter is a billable service, with coverage as determined by his insurance carrier. He is aware that he may receive a bill and has provided verbal consent to proceed: YES    I was in the office while conducting this encounter. 712  HPI/Subjective:   He reports problems with recurrent constipation, indicating that sometime ago he actually ended up having to be disimpacted in the emergency department. He had been taking MiraLAX but stopped because the package said it could only be taken for 7 days. He reports that he is having bowel movements and is not impacted but feels like he is still constipated and having to strain.             Patient Active Problem List   Diagnosis Code    Type 2 diabetes mellitus, without long-term current use of insulin ([facility]) E11.9    Hypercholesterolemia E78.00    Arthritis M19.90    Acid reflux K21.9    Restless leg syndrome G25.81    Multilevel degenerative disc disease M53.9    Hypogonadism male E29.1    NELSON (obstructive sleep apnea) G47.33    Essential hypertension I10    BMI 33.0-33.9,adult Z68.33    S/P AAA repair Z98.890, Z86.79    Decreased GFR R94.4    Chronic seasonal allergic rhinitis due to pollen J30.1    Tinnitus of both ears H93.13    Thoracic compression fracture ([facility]) S22.000A    Bone lesion left femur M89.9    Diabetic polyneuropathy associated with type 2 diabetes mellitus ([facility]) E11.42    Symptomatic bradycardia R00.1    Coronary artery disease involving native coronary artery of native heart without angina pectoris I25.10    Constipation K59.00    History of colonoscopy with polypectomy Z98.890, Z86.010            Allergies   Allergen Reactions    Zocor [Simvastatin] Myalgia         Current Outpatient Medications:     metFORMIN (GLUCOPHAGE) 1,000 mg tablet, Take 1 Tab by mouth two (2) times daily (with meals). , Disp: 180 Tab, Rfl: 0    atorvastatin (Lipitor) 80 mg tablet, Take 1 Tab by mouth daily. , Disp: 90 Tab, Rfl: 4    telmisartan (Micardis) 40 mg tablet, Take 1 Tab by mouth daily. , Disp: 90 Tab, Rfl: 3    glucose blood VI test strips (FREESTYLE LITE STRIPS) strip, ICD10 E11.65 - DM2 daily BG testing., Disp: 100 Strip, Rfl: 3    aspirin delayed-release 81 mg tablet, Take  by mouth daily. , Disp: , Rfl:     TURMERIC PO, Take  by mouth., Disp: , Rfl:     Blood-Glucose Meter misc, Uncontrolled DM2 E11.65. BID testing. Disp 1 meter, Disp: 1 Each, Rfl: 0    glucose blood VI test strips (BLOOD GLUCOSE TEST) strip, Uncontrolled DM2 E11.65. BID testing. Disp 2 boxes of 100 each with 3 refills for 90 day supply. , Disp: 200 Strip, Rfl: 3    Lancets misc, Uncontrolled DM2. BID testing. Disp 2 boxes of 100 each with 3 refills for 90 day supply. , Disp: 2 Package, Rfl: 3    Azelastine (ASTEPRO) 0.15 % (205.5 mcg) nasal spray, two (2) times a day., Disp: , Rfl:     Diabetic Supplies, Jacobo Contreras. (LANCING DEVICE WITH LANCETS) misc, DM2 uncontrolled - BG testing BID., Disp: 1 Each, Rfl: 0    olopatadine (PATANOL) 0.1 % ophthalmic solution, Administer 2 Drops to both eyes two (2) times daily as needed for Allergies. , Disp: 15 mL, Rfl: 3    triamcinolone (ARISTOCORT) 0.5 % topical cream, Apply  to affected area two (2) times a day. use thin layer to affected area, Disp: 15 g, Rfl: 0    omega-3 fatty acids-vitamin e (FISH OIL) 1,000 mg cap, Take 1 Cap by mouth two (2) times a day., Disp: 180 Cap, Rfl: 3    carboxymethylcellulose sodium 1 % DLGl, Apply  to eye., Disp: , Rfl:       Review of Systems   Constitutional: Negative for fever and weight loss. Respiratory: Negative for shortness of breath. Cardiovascular: Negative for chest pain and palpitations.    Gastrointestinal: Positive for constipation ( See HPI). Negative for abdominal pain, diarrhea, nausea and vomiting. PHYSICAL EXAMINATION:    Constitutional: [x] Appears well-developed and well-nourished [x] No apparent distress        Mental status: [x] Alert and awake  [x] Oriented t [x] Able to follow commands      Eyes:   EOM    [x]  Normal        HENT: [x] Normocephalic,      Pulmonary/Chest: [x] Respiratory effort normal   [x] No visualized signs of difficulty breathing or respiratory distress           Musculoskeletal:   [x] No obvious deformities noted with regard to areas viewed           Neurological:        [x] No apparent neurologic deficits noted in areas viewed                 Skin:        [x] No apparent dermatologic abnormalities noted in areas viewed               Psychiatric:       [x] Normal Affect      Other pertinent observable physical exam findings:-None noted          Assessment & Plan:   Diagnoses and all orders for this visit:    1. Chronic constipation    2. Constipation, chronic    Recommend continuing with MiraLAX, 17 g in an 8 ounce beverage daily, sure that this is safe. Continue to increase fluids and dietary fiber. Use OTC stool softeners as needed. Follow-up for new symptoms, worsening symptoms or failure to improve    I advised him to contact the office if his condition worsens, changes, fails to improve as anticipated and with any new problems. He expressed understanding with the diagnosis(es) and plan.           [facility], the patient is at home and the provider is at [facility] [employer] [facility], [address] [place] [address] to the emergency declaration under the [address] [facility], [facility] authority and the [employer] General Act, this Virtual  Visit was conducted, with patient's consent, to reduce the patient's risk of exposure to COVID-19 and provide continuity of care for an established patient. Services were provided through a video synchronous discussion virtually to substitute for in-person clinic visit. Kenneth Garay MD         PLEASE NOTE:   This document has been produced using voice recognition software. Unrecognized errors in transcription may be present.

## 2020-08-24 ENCOUNTER — TELEPHONE (OUTPATIENT)
Dept: FAMILY MEDICINE CLINIC | Age: 70
End: 2020-08-24

## 2020-08-24 DIAGNOSIS — K59.00 CONSTIPATION, UNSPECIFIED CONSTIPATION TYPE: Primary | ICD-10-CM

## 2020-08-24 RX ORDER — FACIAL-BODY WIPES
10 EACH TOPICAL
Qty: 12 SUPPOSITORY | Refills: 1 | Status: SHIPPED | OUTPATIENT
Start: 2020-08-24 | End: 2021-08-30

## 2020-08-24 NOTE — TELEPHONE ENCOUNTER
Prescription for Dulcolax suppositories, to be used only if needed for severe constipation and not more than once daily.

## 2020-08-24 NOTE — TELEPHONE ENCOUNTER
Pt called because Dr Meaghan Orantes had recommended a medication for him for his constipation at his last vv that he originally wanted to wait on and now he is wanting it to be sent in. He could not remember the name of the medication however. Please advise.      Future Appointments   Date Time Provider Nora Bullard   8/26/2020  8:00 AM Armin Ennis MD [facility]

## 2020-08-25 NOTE — PROGRESS NOTES
HISTORY OF PRESENT ILLNESS  Oskar Pulido is a 68 y.o. male. He presents for follow-up with a history of type 2 diabetes, hypertension, hyperlipidemia, coronary artery disease and for Medicare wellness evaluation. He is especially concerned about constipation. He reports that about a year ago he had to be manually disimpacted in the emergency department. This time he indicates that he was having daily stools while taking MiraLAX but stopped after 7 days because of the package instructions. He has been increasing dietary fiber. He called requesting a Dulcolax suppository and this was provided but he suspects he did not satisfactorily administer the medication. He reports that he has only been able to have very small bowel movements successfully and is greatly anxious about this. Mr#: [id number]      Patient Active Problem List   Diagnosis Code    Type 2 diabetes mellitus, without long-term current use of insulin ([facility] [address] E11.9    Hypercholesterolemia E78.00    Arthritis M19.90    Acid reflux K21.9    Restless leg syndrome G25.81    Multilevel degenerative disc disease M53.9    Hypogonadism male E29.1    NELSON (obstructive sleep apnea) G47.33    Essential hypertension I10    BMI 33.0-33.9,adult Z68.33    S/P AAA repair Z98.890, Z86.79    Decreased GFR R94.4    Chronic seasonal allergic rhinitis due to pollen J30.1    Tinnitus of both ears H93.13    Thoracic compression fracture (HCC) S22.000A    Bone lesion left femur M89.9    Diabetic polyneuropathy associated with type 2 diabetes mellitus (HCC) E11.42    Symptomatic bradycardia R00.1    Coronary artery disease involving native coronary artery of native heart without angina pectoris I25.10    Constipation K59.00    History of colonoscopy with polypectomy Z98.890, Z86.010         Current Outpatient Medications:     bisacodyL (DULCOLAX) 10 mg supp, Insert 10 mg into rectum daily as needed for Constipation. , Disp: 12 Suppository, Rfl: 1   metFORMIN (GLUCOPHAGE) 1,000 mg tablet, Take 1 Tab by mouth two (2) times daily (with meals). , Disp: 180 Tab, Rfl: 0    atorvastatin (Lipitor) 80 mg tablet, Take 1 Tab by mouth daily. , Disp: 90 Tab, Rfl: 4    telmisartan (Micardis) 40 mg tablet, Take 1 Tab by mouth daily. , Disp: 90 Tab, Rfl: 3    glucose blood VI test strips (FREESTYLE LITE STRIPS) strip, ICD10 E11.65 - DM2 daily BG testing., Disp: 100 Strip, Rfl: 3    aspirin delayed-release 81 mg tablet, Take  by mouth daily. , Disp: , Rfl:     TURMERIC PO, Take  by mouth., Disp: , Rfl:     Blood-Glucose Meter misc, Uncontrolled DM2 E11.65. BID testing. Disp 1 meter, Disp: 1 Each, Rfl: 0    glucose blood VI test strips (BLOOD GLUCOSE TEST) strip, Uncontrolled DM2 E11.65. BID testing. Disp 2 boxes of 100 each with 3 refills for 90 day supply. , Disp: 200 Strip, Rfl: 3    Lancets misc, Uncontrolled DM2. BID testing. Disp 2 boxes of 100 each with 3 refills for 90 day supply. , Disp: 2 Package, Rfl: 3    Azelastine (ASTEPRO) 0.15 % (205.5 mcg) nasal spray, two (2) times a day., Disp: , Rfl:     Diabetic Supplies, Rachael Montero. (LANCING DEVICE WITH LANCETS) misc, DM2 uncontrolled - BG testing BID., Disp: 1 Each, Rfl: 0    olopatadine (PATANOL) 0.1 % ophthalmic solution, Administer 2 Drops to both eyes two (2) times daily as needed for Allergies. , Disp: 15 mL, Rfl: 3    triamcinolone (ARISTOCORT) 0.5 % topical cream, Apply  to affected area two (2) times a day. use thin layer to affected area, Disp: 15 g, Rfl: 0    omega-3 fatty acids-vitamin e (FISH OIL) 1,000 mg cap, Take 1 Cap by mouth two (2) times a day., Disp: 180 Cap, Rfl: 3    carboxymethylcellulose sodium 1 % DLGl, Apply  to eye., Disp: , Rfl:      Allergies   Allergen Reactions    Zocor [Simvastatin] Myalgia       Review of Systems   Constitutional: Negative for chills, fever and weight loss. HENT: Negative for congestion, hearing loss and sore throat.     Eyes: Negative for blurred vision and double vision. Respiratory: Negative for cough, shortness of breath and wheezing. Cardiovascular: Negative for chest pain, palpitations and leg swelling. Gastrointestinal: Positive for constipation (for 2 weeks -see HPI). Negative for abdominal pain, blood in stool, diarrhea, heartburn, melena, nausea and vomiting. Genitourinary: Negative for dysuria, frequency and urgency. Musculoskeletal: Positive for back pain ( persistent sciatica Sx). Negative for joint pain and myalgias. Skin: Negative for itching and rash. Neurological: Negative for dizziness, tingling, sensory change, focal weakness and headaches. Endo/Heme/Allergies: Negative for environmental allergies. Psychiatric/Behavioral: Negative for depression. The patient is not nervous/anxious and does not have insomnia. Visit Vitals  /78 (BP 1 Location: Left arm, BP Patient Position: Sitting)   Pulse (!) 56   Temp 97.9 °F (36.6 °C) (Temporal)   Resp 15   Ht 6' 3\" (1.905 m)   Wt 248 lb 6.4 oz (112.7 kg)   SpO2 98%   BMI 31.05 kg/m²       Physical Exam  Constitutional:       Appearance: Normal appearance. HENT:      Head: Normocephalic. Right Ear: Tympanic membrane, ear canal and external ear normal.      Left Ear: Tympanic membrane, ear canal and external ear normal.   Eyes:      Extraocular Movements: Extraocular movements intact. Conjunctiva/sclera: Conjunctivae normal.      Pupils: Pupils are equal, round, and reactive to light. Neck:      Musculoskeletal: Neck supple. Vascular: No carotid bruit. Cardiovascular:      Rate and Rhythm: Normal rate and regular rhythm. Heart sounds: Normal heart sounds. Pulmonary:      Effort: Pulmonary effort is normal.      Breath sounds: Normal breath sounds. Abdominal:      General: Bowel sounds are normal.      Palpations: Abdomen is soft. Tenderness: There is no abdominal tenderness.    Genitourinary:     Rectum: Normal.      Comments: Annual rectal exam with no evidence of impaction or other abnormality. Musculoskeletal:         General: No deformity. Right lower leg: No edema. Left lower leg: No edema. Skin:     General: Skin is warm and dry. Neurological:      General: No focal deficit present. Mental Status: He is alert and oriented to person, place, and time. Psychiatric:         Mood and Affect: Mood normal.         Behavior: Behavior normal.         ASSESSMENT and PLAN    ICD-10-CM ICD-9-CM    1. Medicare annual wellness visit, subsequent  Z00.00 V70.0    2. Type 2 diabetes mellitus with diabetic polyneuropathy, without long-term current use of insulin (HCC)  E11.42 250.60      357.2    3. Essential hypertension  I10 401.9    4. Hypercholesterolemia  E78.00 272.0    5. Coronary artery disease involving native coronary artery of native heart without angina pectoris  I25.10 414.01    6. Constipation, unspecified constipation type  K59.00 564.00      Lab today, further disposition pending lab results if indicated  Continue current medications pending lab results  Use MiraLAX, 17 g or 1 capful in an 8 ounce beverage every day  Okay to use Dulcolax suppositories even if using MiraLAX  Increase dietary fiber and fluids  Try a fleets enema which can be obtained at the pharmacy  Avoid dietary salt, starch and sugar and follow a program of regular aerobic exercise  Return for office follow-up in 6 months, sooner with any problems      Date of Service:  2020   Patient Name:  Tanisha Ceron   Patient :  1950     This is a Subsequent Medicare Annual Wellness Visit providing Personalized Prevention Plan Services (PPPS) (Performed 12 months after initial AWV and PPPS )     I have reviewed the patient's medical history in detail and updated the computerized patient record.      History     Past Medical History:   Diagnosis Date    Abrasion of skin 2016    Acid reflux     ACP (advance care planning)     has not yet filled out ACP, discussed 4/1/16    ACP (advance care planning)     has not yet filled out ACP, discussed 4/1/16     Aneurysm ([address]     Aortic root dilation ([address]     Borderline on echo June 8 2015 (done through [facility])    Arthritis     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 No femoral head collapse identified.     Back pain, thoracic     Bilateral arm pain 1/25/2016    Bilateral low back pain without sciatica 1/25/2016    BMI 33.0-33.9,adult     Bone lesion left femur     Asymptomatic, MRI planned for October    Brachial neuritis 3/24/2014    Carpal tunnel syndrome, bilateral     Cervical spondylosis     C4-C6    Chest pain 12/15/2017    Chronic pain     Compression fracture of thoracic vertebra ([address] 1/2016    Coronary artery disease involving native coronary artery of native heart without angina pectoris     DDD (degenerative disc disease), lumbosacral     DDD (degenerative disc disease), lumbosacral     Decreased libido 3/19/2014    3/19/2014:  Lack of interest recently -- Check testosterone levels, doubt this has anything to do with hx of aortic aneurysm surgery by Dr. Addie Love disorder     Diabetes ([address]     Elevated transaminase level     AST and ALT     Essential hypertension     Fall through floor 1/25/2016    GERD (gastroesophageal reflux disease)     Herniated nucleus pulposus     Lumbar diskectomy 1980, C4/5 HNP, has gotten ESIs in the past    History of poliomyelitis     History of shingles 7/2013    Right flank/back, after having vaccine    Hypercholesterolemia     Hypoglycemia 5/3/2017    Hypogonadism male     Impingement syndrome of left shoulder 8/2013    Internal hemorrhoids     Long term (current) use of anticoagulants     baby asa    Low serum testosterone level 3/24/2014    3/21/14:  Serum testosterone 179 (L), free testosterone 5.1 (L) -- Referring to urology to discuss prostate screening and options for repletion     Normal cardiac stress test 9/18/2012 9/18/2012 -- Normal Lexiscan cardiac stress and rest perfusion study, EF 62% (read by Dr. Kalpana Hurst, [facility]), Alpa Reyes 9/21/2012 -- Holter monitor -- Maryfrances Finders 44%, tachy 0%, no arrhythmias detected, some premature atrial and ventricular beats 6/17/2010 -- Normal Lexiscan cardiac stress and rest perfusion study, EF 55% (read by Dr. Camron Saleh) MONICA Hutchinson     NELSON (obstructive sleep apnea)     Unable to tolerate CPAP    Osteoarthritis of right knee 11/13/2013    Xrays ([facility]) R knee - 11/12/13 -- Mild medial compartment DJD -- s/p steroid injection Nov 2013 [Dr. Andrzej Ferraro which resolved knee pain after about a week     Pulmonary nodule, left 11/21/2013    Single 0.5 cm posterior lateral aspect of left lower lobe pleural-based pulmonary nodule; Incidental finding, stable from 11/2008 CT to 6/1/2010 CT     Restless leg syndrome     Routine adult health maintenance 11/21/2013    -- Colon cancer:   Colonoscopy done 11/29/2005 at [facility] [place] -- Vaccinations:   Herpes Zoster vaccine given 2012 at [facility] [place]    S/P AAA repair     Dr. Makenna Khanna following, q3 year duplex of aorta    Seasonal allergic reaction     Spinal stenosis in cervical region 3/24/2014    Type 2 diabetes mellitus, controlled ([address]       Past Surgical History:   Procedure Laterality Date    HX LUMBAR DISKECTOMY N/A 1980    HX ORTHOPAEDIC      left shoulder    VASCULAR SURGERY PROCEDURE UNLIST N/A     AAA repair with stent, US's at [facility] [place]     Current Outpatient Medications   Medication Sig Dispense Refill    bisacodyL (DULCOLAX) 10 mg supp Insert 10 mg into rectum daily as needed for Constipation.  12 Suppository 1    metFORMIN (GLUCOPHAGE) 1,000 mg tablet Take 1 Tab by mouth two (2) times daily (with meals). 180 Tab 0    atorvastatin (Lipitor) 80 mg tablet Take 1 Tab by mouth daily. 90 Tab 4    telmisartan (Micardis) 40 mg tablet Take 1 Tab by mouth daily. 90 Tab 3    glucose blood VI test strips (FREESTYLE LITE STRIPS) strip ICD10 E11.65 - DM2 daily BG testing. 100 Strip 3    aspirin delayed-release 81 mg tablet Take  by mouth daily.  TURMERIC PO Take  by mouth.  Blood-Glucose Meter misc Uncontrolled DM2 E11.65. BID testing. Disp 1 meter 1 Each 0    glucose blood VI test strips (BLOOD GLUCOSE TEST) strip Uncontrolled DM2 E11.65. BID testing. Disp 2 boxes of 100 each with 3 refills for 90 day supply. 200 Strip 3    Lancets misc Uncontrolled DM2. BID testing. Disp 2 boxes of 100 each with 3 refills for 90 day supply. 2 Package 3    Azelastine (ASTEPRO) 0.15 % (205.5 mcg) nasal spray two (2) times a day.  Diabetic Supplies, [address] (LANCING DEVICE WITH LANCETS) misc DM2 uncontrolled - BG testing BID. 1 Each 0    olopatadine (PATANOL) 0.1 % ophthalmic solution Administer 2 Drops to both eyes two (2) times daily as needed for Allergies. 15 mL 3    triamcinolone (ARISTOCORT) 0.5 % topical cream Apply  to affected area two (2) times a day. use thin layer to affected area 15 g 0    omega-3 fatty acids-vitamin e (FISH OIL) 1,000 mg cap Take 1 Cap by mouth two (2) times a day. 180 Cap 3    carboxymethylcellulose sodium 1 % DLGl Apply  to eye.        Allergies   Allergen Reactions    Zocor [Simvastatin] Myalgia     Family History   Problem Relation Age of Onset    Cancer Mother         Rectal, metastasized late 76s    Heart Disease Father     Cancer Brother         Metastatic, not sure of primary    Cancer Maternal Uncle     Cancer Maternal Aunt      Social History     Tobacco Use    Smoking status: Former Smoker     Types: Cigarettes, Pipe    Smokeless tobacco: Former User    Tobacco comment: Quit 43 years ago (2015), pipe 10+ years ago   Substance Use Topics    Alcohol use: Yes     Alcohol/week: 0.0 standard drinks     Comment: Occasional beer, liquor or wine. No heavy use     Patient Active Problem List   Diagnosis Code    Type 2 diabetes mellitus, without long-term current use of insulin ([facility]) E11.9    Hypercholesterolemia E78.00    Arthritis M19.90    Acid reflux K21.9    Restless leg syndrome G25.81    Multilevel degenerative disc disease M53.9    Hypogonadism male E29.1    NELSON (obstructive sleep apnea) G47.33    Essential hypertension I10    BMI 33.0-33.9,adult Z68.33    S/P AAA repair Z98.890, Z86.79    Decreased GFR R94.4    Chronic seasonal allergic rhinitis due to pollen J30.1    Tinnitus of both ears H93.13    Thoracic compression fracture ([facility]) S22.000A    Bone lesion left femur M89.9    Diabetic polyneuropathy associated with type 2 diabetes mellitus ([facility]) E11.42    Symptomatic bradycardia R00.1    Coronary artery disease involving native coronary artery of native heart without angina pectoris I25.10    Constipation K59.00    History of colonoscopy with polypectomy Z98.890, Z86.010       Living situation:   -- Lives with family  -- Stairs in home yes    Diet, Lifestyle: nonsmoker    Exercise level: very active    Depression Risk Factor Screening:     3 most recent PHQ Screens 2/26/2020   Little interest or pleasure in doing things Not at all   Feeling down, depressed, irritable, or hopeless Not at all   Total Score PHQ 2 0     Alcohol Risk Factor Screening: You do not drink alcohol or very rarely. Functional Ability and Level of Safety:     Hearing Loss   Hearing is good. Activities of Daily Living   Self-care. Requires assistance with: no ADLs    Fall Risk     Fall Risk Assessment, last 12 mths 2/26/2020   Able to walk? Yes   Fall in past 12 months?  No   Fall with injury? -   Number of falls in past 12 months -   Fall Risk Score -     Abuse Screen   Patient is not abused    Review of Systems   Review of systems reported in the separate problem-oriented documentation. Physical Examination   Physical exam reported in the separate problem-oriented documentation. Evaluation of Cognitive Function:  Mood/affect:  happy  Appearance: age appropriate  Family member/caregiver input: no    Patient Care Team:  Justin Ruffin MD as PCP - General (Family Medicine)  Justin Ruffin MD as PCP - REHABILITATION [facility] Empaneled Provider  Brianda Barrow MD as Consulting Provider (Orthopedic Surgery)  Karl Hood MD (General Surgery)  Gal De León MD (Vascular Surgery)  Miriam Gonzalez DO as Consulting Provider (Physical Medicine and Rehabilitation)  Mahad Mayfield MD as Consulting Provider (Neurosurgery)  Cheng Arevalo MD as Consulting Provider (Cardiology)    Advice/Counseling   Education and counseling provided:  Advance directives counseling/discussion      Assessment/Plan       ICD-10-CM ICD-9-CM    1. Medicare annual wellness visit, subsequent  Z00.00 V70.0    2. Type 2 diabetes mellitus with diabetic polyneuropathy, without long-term current use of insulin (HCC)  E11.42 250.60      357.2    3. Essential hypertension  I10 401.9    4. Hypercholesterolemia  E78.00 272.0    5. Coronary artery disease involving native coronary artery of native heart without angina pectoris  I25.10 414.01    6.  Constipation, unspecified constipation type  K59.00 564.00      Health maintenance:        5-year personalized preventative services plan:  Complete and return advance directives form  Recommended immunizations-Tdap,  and influenza immunization-patient would like to have the Tdap immunization later this fall when he returns for his influenza immunization  Shingrix current  Glaucoma screening/diabetic eye exam due 3/2022  Colonoscopy due 5/2025  Medicare wellness evaluation and follow-up 8/2021            Stephanie Mejia was provided a written 5-year personalized preventive services plan, which was included in his AVS.    Zain Spivey MD      PLEASE NOTE:   This document has been produced using voice recognition software. Unrecognized errors in transcription may be present.

## 2020-08-26 ENCOUNTER — OFFICE VISIT (OUTPATIENT)
Dept: FAMILY MEDICINE CLINIC | Age: 70
End: 2020-08-26

## 2020-08-26 ENCOUNTER — HOSPITAL ENCOUNTER (OUTPATIENT)
Dept: LAB | Age: 70
Discharge: HOME | End: 2020-08-26
Payer: MEDICARE

## 2020-08-26 VITALS
BODY MASS INDEX: 30.88 KG/M2 | HEIGHT: 75 IN | DIASTOLIC BLOOD PRESSURE: 78 MMHG | SYSTOLIC BLOOD PRESSURE: 126 MMHG | WEIGHT: 248.4 LBS | TEMPERATURE: 97.9 F | OXYGEN SATURATION: 98 % | HEART RATE: 56 BPM | RESPIRATION RATE: 15 BRPM

## 2020-08-26 DIAGNOSIS — I10 ESSENTIAL HYPERTENSION: ICD-10-CM

## 2020-08-26 DIAGNOSIS — E78.00 HYPERCHOLESTEROLEMIA: ICD-10-CM

## 2020-08-26 DIAGNOSIS — E11.42 TYPE 2 DIABETES MELLITUS WITH DIABETIC POLYNEUROPATHY, WITHOUT LONG-TERM CURRENT USE OF INSULIN (HCC): ICD-10-CM

## 2020-08-26 DIAGNOSIS — K59.00 CONSTIPATION, UNSPECIFIED CONSTIPATION TYPE: ICD-10-CM

## 2020-08-26 DIAGNOSIS — Z00.00 MEDICARE ANNUAL WELLNESS VISIT, SUBSEQUENT: Primary | ICD-10-CM

## 2020-08-26 DIAGNOSIS — I25.10 CORONARY ARTERY DISEASE INVOLVING NATIVE CORONARY ARTERY OF NATIVE HEART WITHOUT ANGINA PECTORIS: ICD-10-CM

## 2020-08-26 LAB
ALBUMIN SERPL-MCNC: 4 G/DL (ref 3.4–5)
ALBUMIN/GLOB SERPL: 1.1 {RATIO} (ref 0.8–1.7)
ALP SERPL-CCNC: 86 U/L (ref 45–117)
ALT SERPL-CCNC: 23 U/L (ref 16–61)
ANION GAP SERPL CALC-SCNC: 5 MMOL/L (ref 3–18)
APPEARANCE UR: CLEAR
AST SERPL-CCNC: 15 U/L (ref 10–38)
BASOPHILS # BLD: 0 K/UL (ref 0–0.1)
BASOPHILS NFR BLD: 0 % (ref 0–2)
BILIRUB SERPL-MCNC: 1 MG/DL (ref 0.2–1)
BILIRUB UR QL: NEGATIVE
BUN SERPL-MCNC: 11 MG/DL (ref 7–18)
BUN/CREAT SERPL: 11 (ref 12–20)
CALCIUM SERPL-MCNC: 9.3 MG/DL (ref 8.5–10.1)
CHLORIDE SERPL-SCNC: 108 MMOL/L (ref 100–111)
CHOLEST SERPL-MCNC: 105 MG/DL
CO2 SERPL-SCNC: 27 MMOL/L (ref 21–32)
COLOR UR: NORMAL
CREAT SERPL-MCNC: 1.02 MG/DL (ref 0.6–1.3)
CREAT UR-MCNC: 246 MG/DL (ref 30–125)
DIFFERENTIAL METHOD BLD: ABNORMAL
EOSINOPHIL # BLD: 0.2 K/UL (ref 0–0.4)
EOSINOPHIL NFR BLD: 3 % (ref 0–5)
ERYTHROCYTE [DISTWIDTH] IN BLOOD BY AUTOMATED COUNT: 14.2 % (ref 11.6–14.5)
EST. AVERAGE GLUCOSE BLD GHB EST-MCNC: 160 MG/DL
GLOBULIN SER CALC-MCNC: 3.5 G/DL (ref 2–4)
GLUCOSE SERPL-MCNC: 180 MG/DL (ref 74–99)
GLUCOSE UR STRIP.AUTO-MCNC: NEGATIVE MG/DL
HBA1C MFR BLD: 7.2 % (ref 4.2–5.6)
HCT VFR BLD AUTO: 40.3 % (ref 36–48)
HDLC SERPL-MCNC: 32 MG/DL (ref 40–60)
HDLC SERPL: 3.3 {RATIO} (ref 0–5)
HGB BLD-MCNC: 12.9 G/DL (ref 13–16)
HGB UR QL STRIP: NEGATIVE
KETONES UR QL STRIP.AUTO: NEGATIVE MG/DL
LDLC SERPL CALC-MCNC: 48.6 MG/DL (ref 0–100)
LEUKOCYTE ESTERASE UR QL STRIP.AUTO: NEGATIVE
LIPID PROFILE,FLP: ABNORMAL
LYMPHOCYTES # BLD: 1.6 K/UL (ref 0.9–3.6)
LYMPHOCYTES NFR BLD: 21 % (ref 21–52)
MCH RBC QN AUTO: 29.6 PG (ref 24–34)
MCHC RBC AUTO-ENTMCNC: 32 G/DL (ref 31–37)
MCV RBC AUTO: 92.4 FL (ref 74–97)
MICROALBUMIN UR-MCNC: 1.81 MG/DL (ref 0–3)
MICROALBUMIN/CREAT UR-RTO: 7 MG/G (ref 0–30)
MONOCYTES # BLD: 0.5 K/UL (ref 0.05–1.2)
MONOCYTES NFR BLD: 7 % (ref 3–10)
NEUTS SEG # BLD: 5.1 K/UL (ref 1.8–8)
NEUTS SEG NFR BLD: 69 % (ref 40–73)
NITRITE UR QL STRIP.AUTO: NEGATIVE
PH UR STRIP: 5 [PH] (ref 5–8)
PLATELET # BLD AUTO: 241 K/UL (ref 135–420)
PMV BLD AUTO: 10.2 FL (ref 9.2–11.8)
POTASSIUM SERPL-SCNC: 4.4 MMOL/L (ref 3.5–5.5)
PROT SERPL-MCNC: 7.5 G/DL (ref 6.4–8.2)
PROT UR STRIP-MCNC: NEGATIVE MG/DL
RBC # BLD AUTO: 4.36 M/UL (ref 4.7–5.5)
SODIUM SERPL-SCNC: 140 MMOL/L (ref 136–145)
SP GR UR REFRACTOMETRY: 1.03 (ref 1–1.03)
TRIGL SERPL-MCNC: 122 MG/DL (ref ?–150)
TSH SERPL DL<=0.05 MIU/L-ACNC: 1.53 UIU/ML (ref 0.36–3.74)
UROBILINOGEN UR QL STRIP.AUTO: 1 EU/DL (ref 0.2–1)
VLDLC SERPL CALC-MCNC: 24.4 MG/DL
WBC # BLD AUTO: 7.5 K/UL (ref 4.6–13.2)

## 2020-08-26 PROCEDURE — 36415 COLL VENOUS BLD VENIPUNCTURE: CPT

## 2020-08-26 PROCEDURE — 81003 URINALYSIS AUTO W/O SCOPE: CPT

## 2020-08-26 PROCEDURE — 85025 COMPLETE CBC W/AUTO DIFF WBC: CPT

## 2020-08-26 PROCEDURE — 80061 LIPID PANEL: CPT

## 2020-08-26 PROCEDURE — 80053 COMPREHEN METABOLIC PANEL: CPT

## 2020-08-26 PROCEDURE — 82043 UR ALBUMIN QUANTITATIVE: CPT

## 2020-08-26 PROCEDURE — 83036 HEMOGLOBIN GLYCOSYLATED A1C: CPT

## 2020-08-26 PROCEDURE — 84443 ASSAY THYROID STIM HORMONE: CPT

## 2020-08-26 NOTE — PROGRESS NOTES
Sin Pittman is a 68 y.o. male (: 1950) presenting to address:    Chief Complaint   Patient presents with    Annual Wellness Visit       Vitals:    20 0749   BP: 126/78   Pulse: (!) 56   Resp: 15   Temp: 97.9 °F (36.6 °C)   TempSrc: Temporal   SpO2: 98%   Weight: 248 lb 6.4 oz (112.7 kg)   Height: 6' 3\" (1.905 m)   PainSc:   2   PainLoc: Back       Hearing/Vision:   No exam data present    Learning Assessment:     Learning Assessment 2019   PRIMARY LEARNER Patient   HIGHEST LEVEL OF EDUCATION - PRIMARY LEARNER  SOME COLLEGE   BARRIERS PRIMARY LEARNER NONE   CO-LEARNER CAREGIVER No   PRIMARY LANGUAGE ENGLISH    NEED No   LEARNER PREFERENCE PRIMARY DEMONSTRATION   ANSWERED BY patient   RELATIONSHIP SELF     Depression Screening:     3 most recent PHQ Screens 2020   Little interest or pleasure in doing things Not at all   Feeling down, depressed, irritable, or hopeless Not at all   Total Score PHQ 2 0     Fall Risk Assessment:     Fall Risk Assessment, last 12 mths 2020   Able to walk? Yes   Fall in past 12 months? No   Fall with injury? -   Number of falls in past 12 months -   Fall Risk Score -     Abuse Screening:     Abuse Screening Questionnaire 2019   Do you ever feel afraid of your partner? N   Are you in a relationship with someone who physically or mentally threatens you? N   Is it safe for you to go home? Y     Coordination of Care Questionaire:   1. Have you been to the ER, urgent care clinic since your last visit? Hospitalized since your last visit? NO    2. Have you seen or consulted any other health care providers outside of the [address] [place] [address] since your last visit? Include any pap smears or colon screening. YES, dental    Advanced Directive:   1. Do you have an Advanced Directive? YES    2. Would you like information on Advanced Directives?  NO

## 2020-08-26 NOTE — PATIENT INSTRUCTIONS
5-year personalized preventative services plan: 
Complete and return advance directives form Recommended immunizations-Tdap,  and influenza immunization-Shingrix current Glaucoma screening/diabetic eye exam due 3/2022 Colonoscopy due 5/2025 Medicare wellness evaluation and follow-up 8/2021 Lab today, further disposition pending lab results if indicated Continue current medications pending lab results Use MiraLAX, 17 g or 1 capful in an 8 ounce beverage every day Okay to use Dulcolax suppositories even if using MiraLAX Increase dietary fiber and fluids Try a fleets enema which can be obtained at the pharmacy Avoid dietary salt, starch and sugar and follow a program of regular aerobic exercise Return for office follow-up in 6 months, sooner with any problems

## 2020-08-27 NOTE — PROGRESS NOTES
Please advise Mr. Yasmin Winkler that his lab results show improvement in his diabetic control with his hemoglobin A1c having improved to 7.2%. It would be ideal if this could come down to 7% or less but there is no need to change his medication, only work on further decreasing dietary starch and sugar and getting some regular aerobic exercise.   His cholesterol levels are excellent and no other significant lab abnormalities were noted

## 2020-08-31 ENCOUNTER — TELEPHONE (OUTPATIENT)
Dept: FAMILY MEDICINE CLINIC | Age: 70
End: 2020-08-31

## 2020-08-31 NOTE — TELEPHONE ENCOUNTER
Spoke w/pt and he is wondering if there is any type of imaging that can be ordered, xray to see if he may have a hernia from straining d/t constipation.     Patient scheduled for 9/2/2020 for in office eval.

## 2020-08-31 NOTE — TELEPHONE ENCOUNTER
Patient is constipated and experiencing scrotum pain. He would like to know next steps to help relieve the pain.

## 2020-09-02 ENCOUNTER — OFFICE VISIT (OUTPATIENT)
Dept: FAMILY MEDICINE CLINIC | Age: 70
End: 2020-09-02

## 2020-09-02 VITALS
HEART RATE: 49 BPM | SYSTOLIC BLOOD PRESSURE: 130 MMHG | RESPIRATION RATE: 15 BRPM | WEIGHT: 249.6 LBS | TEMPERATURE: 97.5 F | DIASTOLIC BLOOD PRESSURE: 80 MMHG | HEIGHT: 75 IN | BODY MASS INDEX: 31.04 KG/M2 | OXYGEN SATURATION: 99 %

## 2020-09-02 DIAGNOSIS — E11.42 TYPE 2 DIABETES MELLITUS WITH DIABETIC POLYNEUROPATHY, WITHOUT LONG-TERM CURRENT USE OF INSULIN (HCC): ICD-10-CM

## 2020-09-02 DIAGNOSIS — N45.1 EPIDIDYMITIS, RIGHT: Primary | ICD-10-CM

## 2020-09-02 DIAGNOSIS — M51.9 LUMBAR DISC DISEASE: ICD-10-CM

## 2020-09-02 DIAGNOSIS — E78.00 HYPERCHOLESTEROLEMIA: ICD-10-CM

## 2020-09-02 RX ORDER — DOXYCYCLINE 100 MG/1
CAPSULE ORAL
Qty: 20 CAP | Refills: 0 | Status: SHIPPED | OUTPATIENT
Start: 2020-09-02 | End: 2020-09-04 | Stop reason: SDUPTHER

## 2020-09-02 RX ORDER — ATORVASTATIN CALCIUM 80 MG/1
80 TABLET, FILM COATED ORAL DAILY
Qty: 90 TAB | Refills: 4 | Status: SHIPPED | OUTPATIENT
Start: 2020-09-02 | End: 2020-09-04 | Stop reason: SDUPTHER

## 2020-09-02 NOTE — PROGRESS NOTES
HISTORY OF PRESENT ILLNESS  Sachin Salinas is a 68 y.o. male. Mr. Marcia Briggs presents primarily concerned about right testicular pain which developed sometime over the past 2 weeks. He is concerned that this might be a hernia which has resulted from straining at stool and he has read frightening things about hernias on the Internet. He incidentally reports increasing problems with right lumbar pain which is becoming intolerable. Chart review indicates a remote MR scan confirming lumbar degenerative disc disease. He states that he saw a neurosurgeon in the past because of a \"crack\" in of vertebral body. He appears to have also seen and interventional physical medicine specialist but does not seem to recall that. He indicates that the pain does not seem to radiate into his lower extremity but that he has a great deal of difficulty following a tolerable position      Review of Systems   Constitutional: Negative for fever and weight loss. Respiratory: Negative for shortness of breath. Cardiovascular: Negative for chest pain and palpitations. Gastrointestinal: Positive for constipation ( Reports that he stopped MiraLAX because his stool was \"mushy\"). Negative for abdominal pain, blood in stool, melena, nausea and vomiting. Genitourinary: Negative for dysuria, frequency and urgency. He reports developing right testicular pain over the past 2 weeks and is concerned that it might be related to his constipation and straining at stool. He has read about hernias on the Internet and the possibility of subsequent incarceration, strangulation and need for surgery. Musculoskeletal: Positive for back pain ( He reports right lumbar pain for many years. The pain is becoming intolerable. ). Neurological: Positive for tingling and sensory change. Negative for focal weakness.         Diabetic neuropathy   Psychiatric/Behavioral: The patient is nervous/anxious ( Anxious about back pain, scrotal pain, constipation). Physical Exam  Vitals signs and nursing note reviewed. Constitutional:       Appearance: He is well-developed. He is obese. He is not ill-appearing. Comments: Anxious appearing   HENT:      Head: Normocephalic. Cardiovascular:      Rate and Rhythm: Normal rate. Pulmonary:      Effort: Pulmonary effort is normal.   Genitourinary:     Comments: Testes descended bilaterally without masses, right posterior tenderness noted suggestive of epididymitis, no inguinal hernia palpated  Musculoskeletal:      Comments: Significant pain attempting to lie supine and with climbing on and off the exam table   Skin:     General: Skin is warm and dry. Neurological:      Mental Status: He is alert and oriented to person, place, and time. Psychiatric:         Behavior: Behavior normal.                Diabetic foot exam performed by Alysa Pate MD     Measurement  Response Nurse Comment Physician Comment   Monofilament  R - 4/6  L - 5/6     Pulse DP R - 1+ (weak)  L - 2+ (normal)     Pulse TP R - 1+ (weak)  L - 1+ (weak)     Structural deformity R - None  L - None     Skin Integrity / Deformity R - None  L - None                ASSESSMENT and PLAN    ICD-10-CM ICD-9-CM    1. Epididymitis, right  N45.1 604.90 doxycycline (VIBRAMYCIN) 100 mg capsule   2. Lumbar disc disease  M51.9 722.93 REFERRAL TO NEUROSURGERY   3. Hypercholesterolemia  E78.00 272.0 atorvastatin (Lipitor) 80 mg tablet   4.  Type 2 diabetes mellitus with diabetic polyneuropathy, without long-term current use of insulin ([facility])  E11.42 250.60  DIABETES FOOT EXAM     357.2      Neurosurgery referral to address worsening right lumbar pain  For epididymitis, begin doxycycline 100 mg twice daily with food for 10 days  Wear supportive undershorts, rest with a rolled towel elevating the scrotum and apply moist heat  Follow-up for new symptoms, worsening symptoms or failure to improve

## 2020-09-02 NOTE — PATIENT INSTRUCTIONS
Neurosurgery referral to address worsening right lumbar pain For epididymitis, begin doxycycline 100 mg twice daily with food for 10 days Wear supportive undershorts, rest with a rolled towel elevating the scrotum and apply moist heat Follow-up for new symptoms, worsening symptoms or failure to improve Epididymitis and Orchitis: Care Instructions Your Care Instructions Epididymitis is pain and swelling of the tube that is attached to each testicle. This tube is called the epididymis. Orchitis is pain and swelling of the testicle. Infection with bacteria often causes these conditions. Sexually transmitted infections (STIs) also can cause both conditions. This is often the case in men younger than 28. Other causes in boys and older men are infections from surgery or having a catheter that drains urine. The mumps virus also can cause orchitis. Anti-inflammatory or pain medicines can help with the pain. Antibiotics are used if the problem is caused by bacteria. They are not used if a virus is the cause. Your testicle may stay swollen for many days or even a few weeks. The doctor has checked you carefully, but problems can develop later. If you notice any problems or new symptoms, get medical treatment right away. Follow-up care is a key part of your treatment and safety. Be sure to make and go to all appointments, and call your doctor if you are having problems. It's also a good idea to know your test results and keep a list of the medicines you take. How can you care for yourself at home? · If your doctor prescribed antibiotics, take them as directed. Do not stop taking them just because you feel better. You need to take the full course of antibiotics. · Ask your doctor if you can take an over-the-counter pain medicine, such as acetaminophen (Tylenol), ibuprofen (Advil, Motrin), or naproxen (Aleve). Be safe with medicines. Read and follow all instructions on the label. · Limit your activity to what is comfortable. · Wear snug underwear or an athletic supporter. This can help reduce pain. · Apply either cold or heat to the swollen area. Use the one that works best for your pain. Sitting in a warm bath for 15 minutes twice a day will help reduce the swelling more quickly. · If you have been told that an STI may have caused your condition, do not have sex until your doctor says it is safe. This will prevent spreading the infection. Tell your sex partner or partners that they need to be checked. They may need treatment. When should you call for help? Call your doctor now or seek immediate medical care if: 
· Your pain gets worse. · You have a new or higher fever. · You have new or more swelling of your testicle. · You have new belly pain, or your pain gets worse. Watch closely for changes in your health, and be sure to contact your doctor if: 
· You do not get better as expected. Where can you learn more? Go to http://zuleima-randa.info/ Enter S360 in the search box to learn more about \"Epididymitis and Orchitis: Care Instructions. \" Current as of: August 22, 2019               Content Version: 12.5 © 8739-7327 [employer]. Care instructions adapted under license by [employer] (which disclaims liability or warranty for this information). If you have questions about a medical condition or this instruction, always ask your healthcare professional. [address] any warranty or liability for your use of this information.

## 2020-09-02 NOTE — PROGRESS NOTES
Melony Florian is a 68 y.o. male (: 1950) presenting to address:    Chief Complaint   Patient presents with    Groin Pain       Vitals:    20 1426   BP: 130/80   Pulse: (!) 49   Resp: 15   Temp: 97.5 °F (36.4 °C)   TempSrc: Temporal   SpO2: 99%   Weight: 249 lb 9.6 oz (113.2 kg)   Height: 6' 3\" (1.905 m)   PainSc:   5   PainLoc: Groin       Hearing/Vision:   No exam data present    Learning Assessment:     Learning Assessment 2020   PRIMARY LEARNER Patient   HIGHEST LEVEL OF EDUCATION - PRIMARY LEARNER  SOME COLLEGE   BARRIERS PRIMARY LEARNER NONE   CO-LEARNER CAREGIVER No   PRIMARY LANGUAGE ENGLISH    NEED No   LEARNER PREFERENCE PRIMARY DEMONSTRATION   ANSWERED BY patient   RELATIONSHIP SELF     Depression Screening:     3 most recent PHQ Screens 2020   Little interest or pleasure in doing things Not at all   Feeling down, depressed, irritable, or hopeless Not at all   Total Score PHQ 2 0     Fall Risk Assessment:     Fall Risk Assessment, last 12 mths 2020   Able to walk? Yes   Fall in past 12 months? No   Fall with injury? -   Number of falls in past 12 months -   Fall Risk Score -     Abuse Screening:     Abuse Screening Questionnaire 2020   Do you ever feel afraid of your partner? N   Are you in a relationship with someone who physically or mentally threatens you? N   Is it safe for you to go home? Y     Coordination of Care Questionaire:   1. Have you been to the ER, urgent care clinic since your last visit? Hospitalized since your last visit? NO    2. Have you seen or consulted any other health care providers outside of the [address] [place] [address] since your last visit? Include any pap smears or colon screening. NO    Advanced Directive:   1. Do you have an Advanced Directive? NO    2. Would you like information on Advanced Directives?  NO

## 2020-09-04 ENCOUNTER — TELEPHONE (OUTPATIENT)
Dept: FAMILY MEDICINE CLINIC | Age: 70
End: 2020-09-04

## 2020-09-04 DIAGNOSIS — N45.1 EPIDIDYMITIS, RIGHT: ICD-10-CM

## 2020-09-04 DIAGNOSIS — E78.00 HYPERCHOLESTEROLEMIA: ICD-10-CM

## 2020-09-04 RX ORDER — DOXYCYCLINE 100 MG/1
CAPSULE ORAL
Qty: 20 CAP | Refills: 0 | Status: SHIPPED | OUTPATIENT
Start: 2020-09-04 | End: 2020-09-04 | Stop reason: SDUPTHER

## 2020-09-04 RX ORDER — ATORVASTATIN CALCIUM 80 MG/1
80 TABLET, FILM COATED ORAL DAILY
Qty: 90 TAB | Refills: 4 | Status: SHIPPED | OUTPATIENT
Start: 2020-09-04 | End: 2021-11-30

## 2020-09-04 RX ORDER — ATORVASTATIN CALCIUM 80 MG/1
80 TABLET, FILM COATED ORAL DAILY
Qty: 90 TAB | Refills: 4 | Status: SHIPPED | OUTPATIENT
Start: 2020-09-04 | End: 2020-09-04 | Stop reason: SDUPTHER

## 2020-09-04 RX ORDER — DOXYCYCLINE 100 MG/1
CAPSULE ORAL
Qty: 20 CAP | Refills: 0 | Status: SHIPPED | OUTPATIENT
Start: 2020-09-04 | End: 2021-07-23 | Stop reason: ALTCHOICE

## 2020-09-04 NOTE — TELEPHONE ENCOUNTER
Patient called to state he needs the following Rx's printed so that he can bring to his local pharmacy, he was told the [address] does not take orders from outside providers.

## 2020-09-04 NOTE — TELEPHONE ENCOUNTER
Dr. Bree King, pt needs written Rx's for the last two medications prescribed; you resent them to [facility].

## 2020-10-07 ENCOUNTER — CLINICAL SUPPORT (OUTPATIENT)
Dept: FAMILY MEDICINE CLINIC | Age: 70
End: 2020-10-07
Payer: MEDICARE

## 2020-10-07 VITALS — TEMPERATURE: 97.9 F

## 2020-10-07 DIAGNOSIS — Z23 ENCOUNTER FOR IMMUNIZATION: ICD-10-CM

## 2020-10-07 DIAGNOSIS — Z23 NEEDS FLU SHOT: Primary | ICD-10-CM

## 2020-10-07 PROCEDURE — 90694 VACC AIIV4 NO PRSRV 0.5ML IM: CPT | Performed by: FAMILY MEDICINE

## 2020-10-07 PROCEDURE — 90732 PPSV23 VACC 2 YRS+ SUBQ/IM: CPT | Performed by: FAMILY MEDICINE

## 2020-10-07 NOTE — PROGRESS NOTES
Immunization/s of the Flu vaccine and Pneumovax 23 were administered 10/7/2020 by Hanh Swanson LPN. Patient tolerated procedure well. No reactions noted.

## 2020-10-14 DIAGNOSIS — E11.42 TYPE 2 DIABETES MELLITUS WITH DIABETIC POLYNEUROPATHY, WITHOUT LONG-TERM CURRENT USE OF INSULIN (HCC): ICD-10-CM

## 2020-10-14 RX ORDER — METFORMIN HYDROCHLORIDE 1000 MG/1
1000 TABLET ORAL 2 TIMES DAILY WITH MEALS
Qty: 180 TAB | Refills: 3 | Status: SHIPPED | OUTPATIENT
Start: 2020-10-14 | End: 2021-10-22

## 2020-10-14 RX ORDER — BLOOD-GLUCOSE METER
KIT MISCELLANEOUS
Qty: 100 STRIP | Refills: 3 | Status: SHIPPED | OUTPATIENT
Start: 2020-10-14 | End: 2022-03-01 | Stop reason: SDUPTHER

## 2020-10-14 NOTE — TELEPHONE ENCOUNTER
Requested Prescriptions     Pending Prescriptions Disp Refills    metFORMIN (GLUCOPHAGE) 1,000 mg tablet 180 Tab 0     Sig: Take 1 Tab by mouth two (2) times daily (with meals).  glucose blood VI test strips (FreeStyle Lite Strips) strip 100 Strip 3     Sig: ICD10 E11.65 - DM2 daily BG testing.

## 2021-01-25 ENCOUNTER — TELEPHONE (OUTPATIENT)
Dept: FAMILY MEDICINE CLINIC | Age: 71
End: 2021-01-25

## 2021-01-25 NOTE — TELEPHONE ENCOUNTER
Patient called stating that him and his wife are registered to receive covid vaccines from [facility]. Pt states that he called them and they stated that he would hear from his pcp to schedule. Pt was informed that the  hasn't heard of that and the nurse might not have a response for that either being that it is such new information. Pt asked for a returned call from the nurse, please advise.

## 2021-01-25 NOTE — TELEPHONE ENCOUNTER
Spoke w/pt and our office is not working w[facility]the [facility]; they will hear directly from the [facility].

## 2021-06-16 ENCOUNTER — TELEPHONE (OUTPATIENT)
Dept: FAMILY MEDICINE CLINIC | Age: 71
End: 2021-06-16

## 2021-06-16 NOTE — TELEPHONE ENCOUNTER
Pt would like labs done before his next appt. He is scheduled for [facility]. Please place labs.     Future Appointments   Date Time Provider Nora Aleah   8/30/2021  8:00 AM Fabby Rock MD [facility]

## 2021-06-17 DIAGNOSIS — Z12.5 PROSTATE CANCER SCREENING: ICD-10-CM

## 2021-06-17 DIAGNOSIS — I25.10 CORONARY ARTERY DISEASE INVOLVING NATIVE CORONARY ARTERY OF NATIVE HEART WITHOUT ANGINA PECTORIS: ICD-10-CM

## 2021-06-17 DIAGNOSIS — E11.42 TYPE 2 DIABETES MELLITUS WITH DIABETIC POLYNEUROPATHY, WITHOUT LONG-TERM CURRENT USE OF INSULIN (HCC): Primary | ICD-10-CM

## 2021-06-17 DIAGNOSIS — E78.00 HYPERCHOLESTEROLEMIA: ICD-10-CM

## 2021-06-17 DIAGNOSIS — I10 ESSENTIAL HYPERTENSION: ICD-10-CM

## 2021-06-17 NOTE — TELEPHONE ENCOUNTER
Pt scheduled for the following:   Future Appointments   Date Time Provider Nora Bullard   8/27/2021  7:30 AM LAB_[facility]   8/30/2021  8:00 AM Sophia Mejia MD [facility]

## 2021-06-17 NOTE — TELEPHONE ENCOUNTER
Please advise that lab orders have been entered and assist him with scheduling a lab appointment a few days prior to his Medicare wellness evaluation which is scheduled on 8/30/2021

## 2021-07-22 NOTE — PROGRESS NOTES
HISTORY OF PRESENT ILLNESS  Iqra Richardson is a 65 y.o. male. He reports right shoulder pain after reaching behind him a few days ago and chronic right knee pain previously treated with corticosteroid injection in 2013 at which time x-rays show degenerative changes predominantly involving the medial compartment. Mr#: [id number]      Past Medical History:   Diagnosis Date    Abrasion of skin 1/25/2016    Acid reflux     ACP (advance care planning)     has not yet filled out ACP, discussed 4/1/16    ACP (advance care planning)     has not yet filled out ACP, discussed 4/1/16     Aneurysm ([address]     Aortic root dilation ([address]     Borderline on echo June 8 2015 (done through [facility])    Arthritis     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 No femoral head collapse identified.     Back pain, thoracic     Bilateral arm pain 1/25/2016    Bilateral low back pain without sciatica 1/25/2016    BMI 33.0-33.9,adult     Bone lesion left femur     Asymptomatic, MRI planned for October    Brachial neuritis 3/24/2014    Carpal tunnel syndrome, bilateral     Cervical spondylosis     C4-C6    Chest pain 12/15/2017    Chronic pain     Compression fracture of thoracic vertebra ([address] 1/2016    Coronary artery disease involving native coronary artery of native heart without angina pectoris     DDD (degenerative disc disease), lumbosacral     DDD (degenerative disc disease), lumbosacral     Decreased libido 3/19/2014    3/19/2014:  Lack of interest recently -- Check testosterone levels, doubt this has anything to do with hx of aortic aneurysm surgery by Dr. David Hill disorder     Diabetes ([address]     Elevated transaminase level     AST and ALT     Essential hypertension     Fall through floor 1/25/2016    GERD (gastroesophageal reflux disease)     Herniated nucleus pulposus     Lumbar diskectomy 1980, C4/5 HNP, has gotten ESIs in the past    History of poliomyelitis     History of shingles 7/2013    Right flank/back, after having vaccine    Hypercholesterolemia     Hypoglycemia 5/3/2017    Hypogonadism male     Impingement syndrome of left shoulder 8/2013    Internal hemorrhoids     Long term (current) use of anticoagulants     baby asa    Low serum testosterone level 3/24/2014    3/21/14:  Serum testosterone 179 (L), free testosterone 5.1 (L) -- Referring to urology to discuss prostate screening and options for repletion     Normal cardiac stress test 9/18/2012 9/18/2012 -- Normal Lexiscan cardiac stress and rest perfusion study, EF 62% (read by Dr. Justin Soler, [facility]), Evan Gil 9/21/2012 -- Holter monitor -- Pierre Bachelor 44%, tachy 0%, no arrhythmias detected, some premature atrial and ventricular beats 6/17/2010 -- Normal Lexiscan cardiac stress and rest perfusion study, EF 55% (read by Dr. Nataliia Mccauley) MONICA Phelps     NELSON (obstructive sleep apnea)     Unable to tolerate CPAP    Osteoarthritis of right knee 11/13/2013    Xrays ([facility]) R knee - 11/12/13 -- Mild medial compartment DJD -- s/p steroid injection Nov 2013 [Dr. Tobin Allegra which resolved knee pain after about a week     Pulmonary nodule, left 11/21/2013    Single 0.5 cm posterior lateral aspect of left lower lobe pleural-based pulmonary nodule;  Incidental finding, stable from 11/2008 CT to 6/1/2010 CT     Restless leg syndrome     Routine adult health maintenance 11/21/2013    -- Colon cancer:   Colonoscopy done 11/29/2005 at [facility] -- Vaccinations:   Herpes Zoster vaccine given 2012 at [facility]    S/P AAA repair     Dr. Kesha Lee following, q3 year duplex of aorta    Seasonal allergic reaction     Spinal stenosis in cervical region 3/24/2014    Type 2 diabetes mellitus, controlled [facility])        Past Surgical History:   Procedure Laterality Date    HX LUMBAR DISKECTOMY N/A 1980    HX ORTHOPAEDIC      left shoulder    VASCULAR SURGERY PROCEDURE UNLIST N/A     AAA repair with stent, US's at [facility]       Family History   Problem Relation Age of Onset   [facility] Cancer Mother         Rectal, metastasized late 76s    Heart Disease Father     Cancer Brother         Metastatic, not sure of primary    Cancer Maternal Uncle     Cancer Maternal Aunt        Allergies   Allergen Reactions    Zocor [Simvastatin] Myalgia       Social History     Tobacco Use   Smoking Status Former Smoker    Types: Cigarettes, Pipe   Smokeless Tobacco Former User   Tobacco Comment    Quit 43 years ago (2015), pipe 10+ years ago       Social History     Substance and Sexual Activity   Alcohol Use Yes    Alcohol/week: 0.0 standard drinks    Comment: Occasional beer, liquor or wine.   No heavy use           Patient Active Problem List   Diagnosis Code    Type 2 diabetes mellitus, without long-term current use of insulin ([facility]) E11.9    Hypercholesterolemia E78.00    Arthritis M19.90    Acid reflux K21.9    Restless leg syndrome G25.81    Multilevel degenerative disc disease M53.9    Hypogonadism male E29.1    NELSON (obstructive sleep apnea) G47.33    Essential hypertension I10    BMI 33.0-33.9,adult Z68.33    S/P AAA repair Z98.890, Z86.79    Decreased GFR R94.4    Chronic seasonal allergic rhinitis due to pollen J30.1    Tinnitus of both ears H93.13    Thoracic compression fracture ([facility]) S22.000A    Bone lesion left femur M89.9    Diabetic polyneuropathy associated with type 2 diabetes mellitus ([facility]) E11.42    Symptomatic bradycardia R00.1    Coronary artery disease involving native coronary artery of native heart without angina pectoris I25.10    Constipation K59.00    History of colonoscopy with polypectomy Z98.890, Z86.010         Current Outpatient Medications:     telmisartan (Micardis) 40 mg tablet, Take 1 Tab by mouth daily. , Disp: 90 Tab, Rfl: 0    metFORMIN (GLUCOPHAGE) 1,000 mg tablet, Take 1 Tab by mouth two (2) times daily (with meals). , Disp: 180 Tab, Rfl: 3    glucose blood VI test strips (FreeStyle Lite Strips) strip, ICD10 E11.65 - DM2 daily BG testing., Disp: 100 Strip, Rfl: 3    atorvastatin (Lipitor) 80 mg tablet, Take 1 Tab by mouth daily. , Disp: 90 Tab, Rfl: 4    bisacodyL (DULCOLAX) 10 mg supp, Insert 10 mg into rectum daily as needed for Constipation. , Disp: 12 Suppository, Rfl: 1    aspirin delayed-release 81 mg tablet, Take  by mouth daily. , Disp: , Rfl:     Blood-Glucose Meter misc, Uncontrolled DM2 E11.65. BID testing. Disp 1 meter, Disp: 1 Each, Rfl: 0    glucose blood VI test strips (BLOOD GLUCOSE TEST) strip, Uncontrolled DM2 E11.65. BID testing. Disp 2 boxes of 100 each with 3 refills for 90 day supply. , Disp: 200 Strip, Rfl: 3    Lancets misc, Uncontrolled DM2. BID testing. Disp 2 boxes of 100 each with 3 refills for 90 day supply. , Disp: 2 Package, Rfl: 3    Azelastine (ASTEPRO) 0.15 % (205.5 mcg) nasal spray, two (2) times a day., Disp: , Rfl:     Diabetic Supplies, Maeve Gray. (LANCING DEVICE WITH LANCETS) misc, DM2 uncontrolled - BG testing BID., Disp: 1 Each, Rfl: 0    olopatadine (PATANOL) 0.1 % ophthalmic solution, Administer 2 Drops to both eyes two (2) times daily as needed for Allergies. , Disp: 15 mL, Rfl: 3    triamcinolone (ARISTOCORT) 0.5 % topical cream, Apply  to affected area two (2) times a day. use thin layer to affected area, Disp: 15 g, Rfl: 0    omega-3 fatty acids-vitamin e (FISH OIL) 1,000 mg cap, Take 1 Cap by mouth two (2) times a day., Disp: 180 Cap, Rfl: 3    carboxymethylcellulose sodium 1 % DLGl, Apply  to eye., Disp: , Rfl:          Review of Systems   Constitutional: Negative for fever. Musculoskeletal: Positive for joint pain ( Right knee, right shoulder-see HPI).    Neurological: Positive for focal weakness (He perceives proximal right leg weakness which he attributes to the knee pain). Negative for tingling. Visit Vitals  /82 (BP 1 Location: Left upper arm, BP Patient Position: Sitting, BP Cuff Size: Adult)   Pulse 63   Temp 97.2 °F (36.2 °C) (Temporal)   Resp 15   Ht 6' 1.93\" (1.878 m)   Wt 250 lb 9.6 oz (113.7 kg)   SpO2 97%   BMI 32.23 kg/m²       Physical Exam  Vitals and nursing note reviewed. Constitutional:       General: He is not in acute distress. Appearance: Normal appearance. He is well-developed. He is obese. He is not ill-appearing. HENT:      Head: Normocephalic. Eyes:      Extraocular Movements: Extraocular movements intact. Cardiovascular:      Rate and Rhythm: Normal rate. Pulmonary:      Effort: Pulmonary effort is normal.   Musculoskeletal:      Comments: Right shoulder non tender with full range of motion with discomfort on external rotation well in abduction. Rotator cuff muscle group strength appears to be intact against resistance. Right knee with no effusion or joint line tenderness, negative anterior drawer, no varus or valgus instability, negative patellar grind   Neurological:      Mental Status: He is alert and oriented to person, place, and time. Psychiatric:         Behavior: Behavior normal.         ASSESSMENT and PLAN    ICD-10-CM ICD-9-CM    1. Primary osteoarthritis of right knee  M17.11 715.16 REFERRAL TO ORTHOPEDIC SURGERY   2. Acute pain of right shoulder  M25.511 719.41 REFERRAL TO ORTHOPEDIC SURGERY   Assessment:  Worsening right knee pain which appears to be secondary to osteoarthritis  New onset right shoulder pain following a painful maneuver    Plan:  Orthopedic surgery referral  Return for scheduled lab appointment and Medicare wellness evaluation in August or sooner with any problems    Monica Stack MD      PLEASE NOTE:   This document has been produced using voice recognition software. Unrecognized errors in transcription may be present.

## 2021-07-23 ENCOUNTER — OFFICE VISIT (OUTPATIENT)
Dept: FAMILY MEDICINE CLINIC | Age: 71
End: 2021-07-23
Payer: MEDICARE

## 2021-07-23 VITALS
DIASTOLIC BLOOD PRESSURE: 82 MMHG | BODY MASS INDEX: 32.16 KG/M2 | HEIGHT: 74 IN | RESPIRATION RATE: 15 BRPM | HEART RATE: 63 BPM | OXYGEN SATURATION: 97 % | SYSTOLIC BLOOD PRESSURE: 130 MMHG | WEIGHT: 250.6 LBS | TEMPERATURE: 97.2 F

## 2021-07-23 DIAGNOSIS — M17.11 PRIMARY OSTEOARTHRITIS OF RIGHT KNEE: Primary | ICD-10-CM

## 2021-07-23 DIAGNOSIS — M25.511 ACUTE PAIN OF RIGHT SHOULDER: ICD-10-CM

## 2021-07-23 PROCEDURE — G8427 DOCREV CUR MEDS BY ELIG CLIN: HCPCS | Performed by: FAMILY MEDICINE

## 2021-07-23 PROCEDURE — 1101F PT FALLS ASSESS-DOCD LE1/YR: CPT | Performed by: FAMILY MEDICINE

## 2021-07-23 PROCEDURE — 3017F COLORECTAL CA SCREEN DOC REV: CPT | Performed by: FAMILY MEDICINE

## 2021-07-23 PROCEDURE — G8510 SCR DEP NEG, NO PLAN REQD: HCPCS | Performed by: FAMILY MEDICINE

## 2021-07-23 PROCEDURE — G8536 NO DOC ELDER MAL SCRN: HCPCS | Performed by: FAMILY MEDICINE

## 2021-07-23 PROCEDURE — G8754 DIAS BP LESS 90: HCPCS | Performed by: FAMILY MEDICINE

## 2021-07-23 PROCEDURE — G8752 SYS BP LESS 140: HCPCS | Performed by: FAMILY MEDICINE

## 2021-07-23 PROCEDURE — G8417 CALC BMI ABV UP PARAM F/U: HCPCS | Performed by: FAMILY MEDICINE

## 2021-07-23 PROCEDURE — 99213 OFFICE O/P EST LOW 20 MIN: CPT | Performed by: FAMILY MEDICINE

## 2021-07-23 NOTE — PROGRESS NOTES
Trey Shah presents today for   Chief Complaint   Patient presents with    Shoulder Pain     right     Knee Pain     right        Is someone accompanying this pt? no    Is the patient using any DME equipment during OV? no    Depression Screening:  3 most recent PHQ Screens 7/23/2021   [facility] Not Done Patient Decline   Little interest or pleasure in doing things Not at all   Feeling down, depressed, irritable, or hopeless Not at all   Total Score PHQ 2 0       Learning Assessment:  Learning Assessment 8/26/2020   PRIMARY LEARNER Patient   HIGHEST LEVEL OF EDUCATION - PRIMARY LEARNER  SOME COLLEGE   BARRIERS PRIMARY LEARNER NONE   CO-LEARNER CAREGIVER No   PRIMARY LANGUAGE ENGLISH    NEED No   LEARNER PREFERENCE PRIMARY DEMONSTRATION   ANSWERED BY patient   RELATIONSHIP SELF       Travel Screening:    Travel Screening     Question   Response    In the last month, have you been in contact with someone who was confirmed or suspected to have [address] / COVID-19? No / Unsure    Have you had a COVID-19 viral test in the last 14 days? No    Do you have any of the following new or worsening symptoms? Joint pain;Runny nose (NOT related to COVID)    Have you traveled internationally or domestically in the last month? No      Travel History   Travel since 06/23/21     No documented travel since 06/23/21          Health Maintenance reviewed and discussed and ordered per Provider. Health Maintenance Due   Topic Date Due    DTaP/Tdap/Td series (1 - Tdap) Never done   . Coordination of Care:  1. Have you been to the ER, urgent care clinic since your last visit? Hospitalized since your last visit? no    2. Have you seen or consulted any other health care providers outside of the [address] [place] [address] since your last visit? Include any pap smears or colon screening.  no

## 2021-07-23 NOTE — PATIENT INSTRUCTIONS
Orthopedic surgery referral  Return for scheduled lab appointment and Medicare wellness evaluation in August or sooner with any problems

## 2021-08-27 ENCOUNTER — HOSPITAL ENCOUNTER (OUTPATIENT)
Dept: LAB | Age: 71
Discharge: HOME | End: 2021-08-27
Payer: MEDICARE

## 2021-08-27 ENCOUNTER — APPOINTMENT (OUTPATIENT)
Dept: FAMILY MEDICINE CLINIC | Age: 71
End: 2021-08-27

## 2021-08-27 DIAGNOSIS — I25.10 CORONARY ARTERY DISEASE INVOLVING NATIVE CORONARY ARTERY OF NATIVE HEART WITHOUT ANGINA PECTORIS: ICD-10-CM

## 2021-08-27 DIAGNOSIS — E78.00 HYPERCHOLESTEROLEMIA: ICD-10-CM

## 2021-08-27 DIAGNOSIS — Z12.5 PROSTATE CANCER SCREENING: ICD-10-CM

## 2021-08-27 DIAGNOSIS — I10 ESSENTIAL HYPERTENSION: ICD-10-CM

## 2021-08-27 DIAGNOSIS — E11.42 TYPE 2 DIABETES MELLITUS WITH DIABETIC POLYNEUROPATHY, WITHOUT LONG-TERM CURRENT USE OF INSULIN (HCC): ICD-10-CM

## 2021-08-27 LAB
ALBUMIN SERPL-MCNC: 3.7 G/DL (ref 3.4–5)
ALBUMIN/GLOB SERPL: 1.2 {RATIO} (ref 0.8–1.7)
ALP SERPL-CCNC: 82 U/L (ref 45–117)
ALT SERPL-CCNC: 24 U/L (ref 16–61)
ANION GAP SERPL CALC-SCNC: 3 MMOL/L (ref 3–18)
APPEARANCE UR: ABNORMAL
AST SERPL-CCNC: 13 U/L (ref 10–38)
BASOPHILS # BLD: 0 K/UL (ref 0–0.1)
BASOPHILS NFR BLD: 1 % (ref 0–2)
BILIRUB SERPL-MCNC: 0.5 MG/DL (ref 0.2–1)
BILIRUB UR QL: NEGATIVE
BUN SERPL-MCNC: 13 MG/DL (ref 7–18)
BUN/CREAT SERPL: 14 (ref 12–20)
CALCIUM SERPL-MCNC: 9 MG/DL (ref 8.5–10.1)
CHLORIDE SERPL-SCNC: 109 MMOL/L (ref 100–111)
CHOLEST SERPL-MCNC: 128 MG/DL
CO2 SERPL-SCNC: 28 MMOL/L (ref 21–32)
COLOR UR: YELLOW
CREAT SERPL-MCNC: 0.91 MG/DL (ref 0.6–1.3)
CREAT UR-MCNC: 194 MG/DL (ref 30–125)
DIFFERENTIAL METHOD BLD: ABNORMAL
EOSINOPHIL # BLD: 0.3 K/UL (ref 0–0.4)
EOSINOPHIL NFR BLD: 4 % (ref 0–5)
ERYTHROCYTE [DISTWIDTH] IN BLOOD BY AUTOMATED COUNT: 13.5 % (ref 11.6–14.5)
EST. AVERAGE GLUCOSE BLD GHB EST-MCNC: 214 MG/DL
GLOBULIN SER CALC-MCNC: 3.2 G/DL (ref 2–4)
GLUCOSE SERPL-MCNC: 173 MG/DL (ref 74–99)
GLUCOSE UR STRIP.AUTO-MCNC: >1000 MG/DL
HBA1C MFR BLD: 9.1 % (ref 4.2–5.6)
HCT VFR BLD AUTO: 39.7 % (ref 36–48)
HDLC SERPL-MCNC: 33 MG/DL (ref 40–60)
HDLC SERPL: 3.9 {RATIO} (ref 0–5)
HGB BLD-MCNC: 13.1 G/DL (ref 13–16)
HGB UR QL STRIP: NEGATIVE
KETONES UR QL STRIP.AUTO: ABNORMAL MG/DL
LDLC SERPL CALC-MCNC: 71.8 MG/DL (ref 0–100)
LEUKOCYTE ESTERASE UR QL STRIP.AUTO: NEGATIVE
LIPID PROFILE,FLP: ABNORMAL
LYMPHOCYTES # BLD: 2.2 K/UL (ref 0.9–3.6)
LYMPHOCYTES NFR BLD: 29 % (ref 21–52)
MCH RBC QN AUTO: 30.5 PG (ref 24–34)
MCHC RBC AUTO-ENTMCNC: 33 G/DL (ref 31–37)
MCV RBC AUTO: 92.3 FL (ref 78–100)
MICROALBUMIN UR-MCNC: 1.76 MG/DL (ref 0–3)
MICROALBUMIN/CREAT UR-RTO: 9 MG/G (ref 0–30)
MONOCYTES # BLD: 0.6 K/UL (ref 0.05–1.2)
MONOCYTES NFR BLD: 8 % (ref 3–10)
NEUTS SEG # BLD: 4.5 K/UL (ref 1.8–8)
NEUTS SEG NFR BLD: 58 % (ref 40–73)
NITRITE UR QL STRIP.AUTO: NEGATIVE
PH UR STRIP: 5 [PH] (ref 5–8)
PLATELET # BLD AUTO: 226 K/UL (ref 135–420)
PMV BLD AUTO: 11 FL (ref 9.2–11.8)
POTASSIUM SERPL-SCNC: 4.2 MMOL/L (ref 3.5–5.5)
PROT SERPL-MCNC: 6.9 G/DL (ref 6.4–8.2)
PROT UR STRIP-MCNC: NEGATIVE MG/DL
PSA SERPL-MCNC: 4.1 NG/ML (ref 0–4)
RBC # BLD AUTO: 4.3 M/UL (ref 4.35–5.65)
SODIUM SERPL-SCNC: 140 MMOL/L (ref 136–145)
SP GR UR REFRACTOMETRY: 1.03 (ref 1–1.03)
TRIGL SERPL-MCNC: 116 MG/DL (ref ?–150)
TSH SERPL DL<=0.05 MIU/L-ACNC: 1.84 UIU/ML (ref 0.36–3.74)
UROBILINOGEN UR QL STRIP.AUTO: 0.2 EU/DL (ref 0.2–1)
VLDLC SERPL CALC-MCNC: 23.2 MG/DL
WBC # BLD AUTO: 7.7 K/UL (ref 4.6–13.2)

## 2021-08-27 PROCEDURE — 84443 ASSAY THYROID STIM HORMONE: CPT

## 2021-08-27 PROCEDURE — 81003 URINALYSIS AUTO W/O SCOPE: CPT

## 2021-08-27 PROCEDURE — 80061 LIPID PANEL: CPT

## 2021-08-27 PROCEDURE — 83036 HEMOGLOBIN GLYCOSYLATED A1C: CPT

## 2021-08-27 PROCEDURE — 80053 COMPREHEN METABOLIC PANEL: CPT

## 2021-08-27 PROCEDURE — 84153 ASSAY OF PSA TOTAL: CPT

## 2021-08-27 PROCEDURE — 82043 UR ALBUMIN QUANTITATIVE: CPT

## 2021-08-27 PROCEDURE — 85025 COMPLETE CBC W/AUTO DIFF WBC: CPT

## 2021-08-27 PROCEDURE — 36415 COLL VENOUS BLD VENIPUNCTURE: CPT

## 2021-08-28 NOTE — PROGRESS NOTES
HISTORY OF PRESENT ILLNESS  Matthew Varma is a 65 y.o. male. He presents for follow-up with a history of type 2 diabetes with diabetic neuropathy, coronary artery disease, hypertension, hyperlipidemia, previous abdominal aortic aneurysm repair, osteoarthritis of the right knee with recent corticosteroid injection per orthopedic surgery and obesity as well as for Medicare wellness evaluation    Mr#: [id number]      Past Medical History:   Diagnosis Date    Abrasion of skin 1/25/2016    Acid reflux     ACP (advance care planning)     has not yet filled out ACP, discussed 4/1/16    ACP (advance care planning)     has not yet filled out ACP, discussed 4/1/16     Aneurysm ([address]     Aortic root dilation ([address]     Borderline on echo June 8 2015 (done through [facility])    Arthritis     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 No femoral head collapse identified.     Back pain, thoracic     Bilateral arm pain 1/25/2016    Bilateral low back pain without sciatica 1/25/2016    BMI 33.0-33.9,adult     Bone lesion left femur     Asymptomatic, MRI planned for October    Brachial neuritis 3/24/2014    Carpal tunnel syndrome, bilateral     Cervical spondylosis     C4-C6    Chest pain 12/15/2017    Chronic pain     Compression fracture of thoracic vertebra ([address] 1/2016    Coronary artery disease involving native coronary artery of native heart without angina pectoris     DDD (degenerative disc disease), lumbosacral     DDD (degenerative disc disease), lumbosacral     Decreased libido 3/19/2014    3/19/2014:  Lack of interest recently -- Check testosterone levels, doubt this has anything to do with hx of aortic aneurysm surgery by Dr. Elaine Nuñez disorder     Diabetes ([facility]     Elevated transaminase level     AST and ALT     Essential hypertension     Fall through floor 1/25/2016    GERD (gastroesophageal reflux disease)     Herniated nucleus pulposus     Lumbar diskectomy 1980, C4/5 HNP, has gotten ESIs in the past    History of poliomyelitis     History of shingles 7/2013    Right flank/back, after having vaccine    Hypercholesterolemia     Hypoglycemia 5/3/2017    Hypogonadism male     Impingement syndrome of left shoulder 8/2013    Internal hemorrhoids     Long term (current) use of anticoagulants     baby asa    Low serum testosterone level 3/24/2014    3/21/14:  Serum testosterone 179 (L), free testosterone 5.1 (L) -- Referring to urology to discuss prostate screening and options for repletion     Normal cardiac stress test 9/18/2012 9/18/2012 -- Normal Lexiscan cardiac stress and rest perfusion study, EF 62% (read by Dr. Domo Lynch, [facility]), [place] Hamper 9/21/2012 -- Holter monitor -- [facility] 44%, tachy 0%, no arrhythmias detected, some premature atrial and ventricular beats 6/17/2010 -- Normal Lexiscan cardiac stress and rest perfusion study, EF 55% (read by Dr. Sulema Ferrer) [place]     NELSON (obstructive sleep apnea)     Unable to tolerate CPAP    Osteoarthritis of right knee 11/13/2013    Xrays ([facility]) R knee - 11/12/13 -- Mild medial compartment DJD -- s/p steroid injection Nov 2013 [Dr. Gonzalez Scot which resolved knee pain after about a week     Pulmonary nodule, left 11/21/2013    Single 0.5 cm posterior lateral aspect of left lower lobe pleural-based pulmonary nodule;  Incidental finding, stable from 11/2008 CT to 6/1/2010 CT     Restless leg syndrome     Routine adult health maintenance 11/21/2013    -- Colon cancer:   Colonoscopy done 11/29/2005 at [facility] -- Vaccinations:   Herpes Zoster vaccine given 2012 at [facility]    S/P AAA repair     Dr. Mariia Dill following, q3 year duplex of aorta    Seasonal allergic reaction     Spinal stenosis in cervical region 3/24/2014    Type 2 diabetes mellitus, controlled ([address]        Past Surgical History:   Procedure Laterality Date    HX LUMBAR DISKECTOMY N/A 1980    HX ORTHOPAEDIC      left shoulder    VASCULAR SURGERY PROCEDURE UNLIST N/A     AAA repair with stent, US's at [facility]       Family History   Problem Relation Age of Onset   [facility] Cancer Mother         Rectal, metastasized late 76s    Heart Disease Father     Cancer Brother         Metastatic, not sure of primary    Cancer Maternal Uncle     Cancer Maternal Aunt        Allergies   Allergen Reactions    Zocor [Simvastatin] Myalgia       Social History     Tobacco Use   Smoking Status Former Smoker    Types: Cigarettes, Pipe   Smokeless Tobacco Former User   Tobacco Comment    Quit 43 years ago (2015), pipe 10+ years ago       Social History     Substance and Sexual Activity   Alcohol Use Yes    Alcohol/week: 0.0 standard drinks    Comment: Occasional beer, liquor or wine.   No heavy use           Patient Active Problem List   Diagnosis Code    Type 2 diabetes mellitus, without long-term current use of insulin ([facility]) E11.9    Hypercholesterolemia E78.00    Arthritis M19.90    Acid reflux K21.9    Restless leg syndrome G25.81    Multilevel degenerative disc disease M53.9    Hypogonadism male E29.1    NELSON (obstructive sleep apnea) G47.33    Essential hypertension I10    BMI 33.0-33.9,adult Z68.33    S/P AAA repair Z98.890, Z86.79    Decreased GFR R94.4    Chronic seasonal allergic rhinitis due to pollen J30.1    Tinnitus of both ears H93.13    Thoracic compression fracture ([facility]) S22.000A    Bone lesion left femur M89.9    Diabetic polyneuropathy associated with type 2 diabetes mellitus ([facility]) E11.42    Symptomatic bradycardia R00.1    Coronary artery disease involving native coronary artery of native heart without angina pectoris I25.10    Constipation K59.00    History of colonoscopy with polypectomy Z98.890, Z86.010 Current Outpatient Medications:     telmisartan (Micardis) 40 mg tablet, Take 1 Tab by mouth daily. , Disp: 90 Tab, Rfl: 0    metFORMIN (GLUCOPHAGE) 1,000 mg tablet, Take 1 Tab by mouth two (2) times daily (with meals). , Disp: 180 Tab, Rfl: 3    glucose blood VI test strips (FreeStyle Lite Strips) strip, ICD10 E11.65 - DM2 daily BG testing., Disp: 100 Strip, Rfl: 3    atorvastatin (Lipitor) 80 mg tablet, Take 1 Tab by mouth daily. , Disp: 90 Tab, Rfl: 4    aspirin delayed-release 81 mg tablet, Take  by mouth daily. , Disp: , Rfl:     Blood-Glucose Meter misc, Uncontrolled DM2 E11.65. BID testing. Disp 1 meter, Disp: 1 Each, Rfl: 0    glucose blood VI test strips (BLOOD GLUCOSE TEST) strip, Uncontrolled DM2 E11.65. BID testing. Disp 2 boxes of 100 each with 3 refills for 90 day supply. , Disp: 200 Strip, Rfl: 3    Lancets misc, Uncontrolled DM2. BID testing. Disp 2 boxes of 100 each with 3 refills for 90 day supply. , Disp: 2 Package, Rfl: 3    Azelastine (ASTEPRO) 0.15 % (205.5 mcg) nasal spray, two (2) times a day., Disp: , Rfl:     Diabetic Supplies, Juliana Comp. (LANCING DEVICE WITH LANCETS) misc, DM2 uncontrolled - BG testing BID., Disp: 1 Each, Rfl: 0    olopatadine (PATANOL) 0.1 % ophthalmic solution, Administer 2 Drops to both eyes two (2) times daily as needed for Allergies. (Patient taking differently: Administer 2 Drops to both eyes two (2) times a day.), Disp: 15 mL, Rfl: 3    triamcinolone (ARISTOCORT) 0.5 % topical cream, Apply  to affected area two (2) times a day. use thin layer to affected area, Disp: 15 g, Rfl: 0    omega-3 fatty acids-vitamin e (FISH OIL) 1,000 mg cap, Take 1 Cap by mouth two (2) times a day., Disp: 180 Cap, Rfl: 3         Review of Systems   Constitutional: Negative for chills, fever and weight loss. HENT: Positive for hearing loss (right ear-only high-frequency hearing loss). Negative for congestion, ear pain and sore throat.     Eyes: Negative for blurred vision and double vision. Respiratory: Negative for cough, shortness of breath and wheezing. Cardiovascular: Negative for chest pain, palpitations and leg swelling. Gastrointestinal: Negative for abdominal pain, blood in stool, constipation, diarrhea, heartburn, melena, nausea and vomiting. Genitourinary: Negative for dysuria and urgency. Musculoskeletal: Positive for joint pain (right knee, right hip pain). Negative for myalgias. Skin: Negative for itching and rash. Neurological: Positive for tingling ( Neuropathy symptoms unchanged). Negative for dizziness, sensory change, focal weakness and headaches. Endo/Heme/Allergies: Negative for environmental allergies and polydipsia. Has noted occasional high glucometer readings   Psychiatric/Behavioral: Negative for depression. The patient is not nervous/anxious and does not have insomnia. Visit Vitals  BP (!) 165/84 (BP 1 Location: Left upper arm, BP Patient Position: Sitting, BP Cuff Size: Adult)   Pulse 60   Temp 98 °F (36.7 °C) (Temporal)   Resp 16   Ht 6' 1.93\" (1.878 m)   Wt 245 lb (111.1 kg)   SpO2 98%   BMI 31.52 kg/m²       Physical Exam  Vitals and nursing note reviewed. Constitutional:       General: He is not in acute distress. Appearance: Normal appearance. He is obese. He is not ill-appearing. HENT:      Head: Normocephalic. Right Ear: Tympanic membrane, ear canal and external ear normal.      Left Ear: Tympanic membrane, ear canal and external ear normal.   Eyes:      Extraocular Movements: Extraocular movements intact. Conjunctiva/sclera: Conjunctivae normal.      Pupils: Pupils are equal, round, and reactive to light. Neck:      Vascular: No carotid bruit. Cardiovascular:      Rate and Rhythm: Normal rate and regular rhythm. Heart sounds: Normal heart sounds. Comments: Blood pressure atypically elevated, patient reports normal readings at home.   Pulmonary:      Effort: Pulmonary effort is normal.      Breath sounds: Normal breath sounds. Abdominal:      Palpations: Abdomen is soft. Tenderness: There is no abdominal tenderness. Musculoskeletal:         General: No deformity. Cervical back: Neck supple. Right lower leg: No edema. Left lower leg: No edema. Skin:     General: Skin is warm and dry. Neurological:      General: No focal deficit present. Mental Status: He is alert and oriented to person, place, and time. Psychiatric:         Mood and Affect: Mood normal.         Behavior: Behavior normal.            Diabetic foot exam performed by Lucia Wylie MD     Measurement  Response Nurse Comment Physician Comment   Monofilament  R - 4/6  L - 3/6     Pulse DP R - 2+ (normal)  L - 2+ (normal)     Pulse TP R - 2+ (normal)  L - 1+ (weak)     Structural deformity R - None  L - None     Skin Integrity / Deformity R - None  L - None               Results for Joellen Dill (MRN [id number]) as of 8/28/2021 15:58   Ref. Range 8/26/2020 08:33 8/27/2021 07:16   WBC Latest Ref Range: 4.6 - 13.2 K/uL 7.5 7.7   RBC Latest Ref Range: 4.35 - 5.65 M/uL 4.36 (L) 4.30 (L)   HGB Latest Ref Range: 13.0 - 16.0 g/dL 12.9 (L) 13.1   HCT Latest Ref Range: 36.0 - 48.0 % 40.3 39.7   MCV Latest Ref Range: 78.0 - 100.0 FL 92.4 92.3   MCH Latest Ref Range: 24.0 - 34.0 PG 29.6 30.5   MCHC Latest Ref Range: 31.0 - 37.0 g/dL 32.0 33.0   RDW Latest Ref Range: 11.6 - 14.5 % 14.2 13.5   PLATELET Latest Ref Range: 135 - 420 K/uL 241 226   MPV Latest Ref Range: 9.2 - 11.8 FL 10.2 11.0   NEUTROPHILS Latest Ref Range: 40 - 73 % 69 58   LYMPHOCYTES Latest Ref Range: 21 - 52 % 21 29   MONOCYTES Latest Ref Range: 3 - 10 % 7 8   EOSINOPHILS Latest Ref Range: 0 - 5 % 3 4   BASOPHILS Latest Ref Range: 0 - 2 % 0 1   DF Latest Units:   AUTOMATED AUTOMATED   ABS. NEUTROPHILS Latest Ref Range: 1.8 - 8.0 K/UL 5.1 4.5   ABS. LYMPHOCYTES Latest Ref Range: 0.9 - 3.6 K/UL 1.6 2.2   ABS.  MONOCYTES Latest Ref Range: 0.05 - 1.2 K/UL 0.5 0.6   ABS. EOSINOPHILS Latest Ref Range: 0.0 - 0.4 K/UL 0.2 0.3   ABS. BASOPHILS Latest Ref Range: 0.0 - 0.1 K/UL 0.0 0.0   Color Latest Units:   DARK YELLOW YELLOW   Appearance Latest Units:   CLEAR CLOUDY   Specific gravity Latest Ref Range: 1.005 - 1.030   1.026 1.030   pH (UA) Latest Ref Range: 5.0 - 8.0   5.0 5.0   Protein Latest Ref Range: NEG mg/dL Negative Negative   Glucose Latest Ref Range: NEG mg/dL Negative >1,000 (A)   Ketone Latest Ref Range: NEG mg/dL Negative TRACE (A)   Blood Latest Ref Range: NEG   Negative Negative   Bilirubin Latest Ref Range: NEG   Negative Negative   Urobilinogen Latest Ref Range: 0.2 - 1.0 EU/dL 1.0 0.2   Nitrites Latest Ref Range: NEG   Negative Negative   Leukocyte Esterase Latest Ref Range: NEG   Negative Negative   Sodium Latest Ref Range: 136 - 145 mmol/L 140 140   Potassium Latest Ref Range: 3.5 - 5.5 mmol/L 4.4 4.2   Chloride Latest Ref Range: 100 - 111 mmol/L 108 109   CO2 Latest Ref Range: 21 - 32 mmol/L 27 28   Anion gap Latest Ref Range: 3.0 - 18 mmol/L 5 3   Glucose Latest Ref Range: 74 - 99 mg/dL 180 (H) 173 (H)   BUN Latest Ref Range: 7.0 - 18 MG/DL 11 13   Creatinine Latest Ref Range: 0.6 - 1.3 MG/DL 1.02 0.91   BUN/Creatinine ratio Latest Ref Range: 12 - 20   11 (L) 14   Calcium Latest Ref Range: 8.5 - 10.1 MG/DL 9.3 9.0   GFR est non-AA Latest Ref Range: >60 ml/min/1.73m2 >60 >60   GFR est AA Latest Ref Range: >60 ml/min/1.73m2 >60 >60   Bilirubin, total Latest Ref Range: 0.2 - 1.0 MG/DL 1.0 0.5   Protein, total Latest Ref Range: 6.4 - 8.2 g/dL 7.5 6.9   Albumin Latest Ref Range: 3.4 - 5.0 g/dL 4.0 3.7   Globulin Latest Ref Range: 2.0 - 4.0 g/dL 3.5 3.2   A-G Ratio Latest Ref Range: 0.8 - 1.7   1.1 1.2   ALT Latest Ref Range: 16 - 61 U/L 23 24   AST Latest Ref Range: 10 - 38 U/L 15 13   Alk.  phosphatase Latest Ref Range: 45 - 117 U/L 86 82   Triglyceride Latest Ref Range: <150 MG/ 116   Cholesterol, total Latest Ref Range: <200 MG/DL 105 128   HDL Cholesterol Latest Ref Range: 40 - 60 MG/DL 32 (L) 33 (L)   CHOL/HDL Ratio Latest Ref Range: 0 - 5.0   3.3 3.9   VLDL, calculated Latest Units: MG/DL 24.4 23.2   LDL, calculated Latest Ref Range: 0 - 100 MG/DL 48.6 71.8   Hemoglobin A1c, (calculated) Latest Ref Range: 4.2 - 5.6 % 7.2 (H) 9.1 (H)   Est. average glucose Latest Units: mg/dL 160 214   Creatinine, urine Latest Ref Range: 30 - 125 mg/dL 246.00 (H) 194.00 (H)   Microalbumin,urine random Latest Ref Range: 0 - 3.0 MG/DL 1.81 1.76   Microalbumin/Creat. Ratio Latest Ref Range: 0 - 30 mg/g 7 9   TSH Latest Ref Range: 0.36 - 3.74 uIU/mL 1.53 1.84   Prostate Specific Ag Latest Ref Range: 0.0 - 4.0 ng/mL  4.1 (H)                   ASSESSMENT and PLAN    ICD-10-CM ICD-9-CM    1. Medicare annual wellness visit, subsequent  Z00.00 V70.0    2. Type 2 diabetes mellitus with diabetic polyneuropathy, without long-term current use of insulin (HCC)  E11.42 250.60  DIABETES FOOT EXAM     357.2 HEMOGLOBIN A1C WITH EAG   3. Coronary artery disease involving native coronary artery of native heart without angina pectoris  I25.10 414.01    4. Essential hypertension  C87 365.7 METABOLIC PANEL, BASIC   5. Hypercholesterolemia  E78.00 272.0    6. S/P AAA repair  Z98.890 V45.89     Z86.79     7. Primary osteoarthritis of right knee  M17.11 715.16    8. Obesity (BMI 30.0-34. 9)  E66.9 278.00    9.  Elevated PSA, less than 10 ng/ml  R97.20 790.93 PSA, DIAGNOSTIC (PROSTATE SPECIFIC AG)   Assessment:  Type 2 diabetes now in poor control  Coronary artery disease asymptomatic  Satisfactory control of hypertension and hyperlipidemia  Knee pain improved following corticosteroid injection  Obesity essentially unchanged  Lab showing minimal elevation of PSA    Plan:  Continue current medication and add Tradjenta 5 mg daily while increasing efforts at avoiding dietary starch and sugar and is much as possible following a program of regular aerobic exercise  Monitor blood pressures, record readings a few times each week and bring these to the next appointment in 3 months  Return for follow-up in 3 months preceded by lab appointment for hemoglobin A1c and repeat PSA level          Date of Service:  2021   Patient Name:  Urszula Cannon   Patient :  1950     This is a Subsequent Medicare Annual Wellness Visit providing Personalized Prevention Plan Services (PPPS) (Performed 12 months after initial AWV and PPPS )     I have reviewed the patient's medical history in detail and updated the computerized patient record. History     Past Medical History:   Diagnosis Date    Abrasion of skin 2016    Acid reflux     ACP (advance care planning)     has not yet filled out ACP, discussed 16    ACP (advance care planning)     has not yet filled out ACP, discussed 16     Aneurysm ([address]     Aortic root dilation ([address]     Borderline on echo 2015 (done through [facility])    Arthritis     Avascular necrosis of bones of both hips ([address] 2016    MRI L-spine revealed bialteral AVN femoral heads (MRI/CT dx, report submitted for scannin)     Avascular necrosis of femoral head ([address] 2015    BILATERAL, noted on CT 2/24/15 ([place]) - Avascular necrosis involving both femoral heads. No femoral head collapse identified.     Back pain, thoracic     Bilateral arm pain 2016    Bilateral low back pain without sciatica 2016    BMI 33.0-33.9,adult     Bone lesion left femur     Asymptomatic, MRI planned for October    Brachial neuritis 3/24/2014    Carpal tunnel syndrome, bilateral     Cervical spondylosis     C4-C6    Chest pain 12/15/2017    Chronic pain     Compression fracture of thoracic vertebra ([address] 2016    Coronary artery disease involving native coronary artery of native heart without angina pectoris     DDD (degenerative disc disease), lumbosacral     DDD (degenerative disc disease), lumbosacral     Decreased libido 3/19/2014 3/19/2014:  Lack of interest recently -- Check testosterone levels, doubt this has anything to do with hx of aortic aneurysm surgery by Dr. Kenia Lemon disorder     Diabetes ([facility]     Elevated transaminase level     AST and ALT     Essential hypertension     Fall through floor 1/25/2016    GERD (gastroesophageal reflux disease)     Herniated nucleus pulposus     Lumbar diskectomy 1980, C4/5 HNP, has gotten ESIs in the past    History of poliomyelitis     History of shingles 7/2013    Right flank/back, after having vaccine    Hypercholesterolemia     Hypoglycemia 5/3/2017    Hypogonadism male     Impingement syndrome of left shoulder 8/2013    Internal hemorrhoids     Long term (current) use of anticoagulants     baby asa    Low serum testosterone level 3/24/2014    3/21/14:  Serum testosterone 179 (L), free testosterone 5.1 (L) -- Referring to urology to discuss prostate screening and options for repletion     Normal cardiac stress test 9/18/2012 9/18/2012 -- Normal Lexiscan cardiac stress and rest perfusion study, EF 62% (read by Dr. Krysta Serrano, [facility]), [place] 9/21/2012 -- Holter monitor -- Thresa Nurse 44%, tachy 0%, no arrhythmias detected, some premature atrial and ventricular beats 6/17/2010 -- Normal Lexiscan cardiac stress and rest perfusion study, EF 55% (read by Dr. Dena Larsen) MONICA [place]     NELSON (obstructive sleep apnea)     Unable to tolerate CPAP    Osteoarthritis of right knee 11/13/2013    Xrays ([facility]) R knee - 11/12/13 -- Mild medial compartment DJD -- s/p steroid injection Nov 2013 [Dr. Milagros Gaming which resolved knee pain after about a week     Pulmonary nodule, left 11/21/2013    Single 0.5 cm posterior lateral aspect of left lower lobe pleural-based pulmonary nodule;  Incidental finding, stable from 11/2008 CT to 6/1/2010 CT     Restless leg syndrome     Routine adult health maintenance 11/21/2013    -- Colon cancer:   Colonoscopy done 11/29/2005 at [facility] -- Vaccinations:   Herpes Zoster vaccine given 2012 at [facility]    S/P AAA repair     Dr. Ling Hall following, q3 year duplex of aorta    Seasonal allergic reaction     Spinal stenosis in cervical region 3/24/2014    Type 2 diabetes mellitus, [facility]       Past Surgical History:   Procedure Laterality Date    HX LUMBAR DISKECTOMY N/A 1980    HX ORTHOPAEDIC      left shoulder    VASCULAR SURGERY PROCEDURE UNLIST N/A     AAA repair with stent, US's at [facility]     Current Outpatient Medications   Medication Sig Dispense Refill    telmisartan (Micardis) 40 mg tablet Take 1 Tab by mouth daily. 90 Tab 0    metFORMIN (GLUCOPHAGE) 1,000 mg tablet Take 1 Tab by mouth two (2) times daily (with meals). 180 Tab 3    glucose blood VI test strips (FreeStyle Lite Strips) strip ICD10 E11.65 - DM2 daily BG testing. 100 Strip 3    atorvastatin (Lipitor) 80 mg tablet Take 1 Tab by mouth daily. 90 Tab 4    bisacodyL (DULCOLAX) 10 mg supp Insert 10 mg into rectum daily as needed for Constipation. 12 Suppository 1    aspirin delayed-release 81 mg tablet Take  by mouth daily.  Blood-Glucose Meter misc Uncontrolled DM2 E11.65. BID testing. Disp 1 meter 1 Each 0    glucose blood VI test strips (BLOOD GLUCOSE TEST) strip Uncontrolled DM2 E11.65. BID testing. Disp 2 boxes of 100 each with 3 refills for 90 day supply. 200 Strip 3    Lancets misc Uncontrolled DM2. BID testing. Disp 2 boxes of 100 each with 3 refills for 90 day supply. 2 Package 3    Azelastine (ASTEPRO) 0.15 % (205.5 mcg) nasal spray two (2) times a day.  Diabetic Supplies, [address] (LANCING DEVICE WITH LANCETS) misc DM2 uncontrolled - BG testing BID. 1 Each 0    olopatadine (PATANOL) 0.1 % ophthalmic solution Administer 2 Drops to both eyes two (2) times daily as needed for Allergies. 15 mL 3    triamcinolone (ARISTOCORT) 0.5 % topical cream Apply  to affected area two (2) times a day.  use thin layer to affected area 15 g 0    omega-3 fatty acids-vitamin e (FISH OIL) 1,000 mg cap Take 1 Cap by mouth two (2) times a day. 180 Cap 3    carboxymethylcellulose sodium 1 % DLGl Apply  to eye. Allergies   Allergen Reactions    Zocor [Simvastatin] Myalgia     Family History   Problem Relation Age of Onset    Cancer Mother         Rectal, metastasized late 76s    Heart Disease Father     Cancer Brother         Metastatic, not sure of primary    Cancer Maternal Uncle     Cancer Maternal Aunt      Social History     Tobacco Use    Smoking status: Former Smoker     Types: Cigarettes, Pipe    Smokeless tobacco: Former User    Tobacco comment: Quit 43 years ago (2015), pipe 10+ years ago   Substance Use Topics    Alcohol use: Yes     Alcohol/week: 0.0 standard drinks     Comment: Occasional beer, liquor or wine.   No heavy use     Patient Active Problem List   Diagnosis Code    Type 2 diabetes mellitus, without long-term current use of insulin ([facility]) E11.9    Hypercholesterolemia E78.00    Arthritis M19.90    Acid reflux K21.9    Restless leg syndrome G25.81    Multilevel degenerative disc disease M53.9    Hypogonadism male E29.1    NELSON (obstructive sleep apnea) G47.33    Essential hypertension I10    BMI 33.0-33.9,adult Z68.33    S/P AAA repair Z98.890, Z86.79    Decreased GFR R94.4    Chronic seasonal allergic rhinitis due to pollen J30.1    Tinnitus of both ears H93.13    Thoracic compression fracture ([facility]) S22.000A    Bone lesion left femur M89.9    Diabetic polyneuropathy associated with type 2 diabetes mellitus ([facility]) E11.42    Symptomatic bradycardia R00.1    Coronary artery disease involving native coronary artery of native heart without angina pectoris I25.10    Constipation K59.00    History of colonoscopy with polypectomy Z98.890, Z86.010       Living situation:   -- Lives  with family  -- Stairs in home yes    Diet, Lifestyle: nonsmoker    Exercise level: moderately active    Depression Risk Factor Screening:     3 most recent [facility] Screens 7/23/2021   [facility] Not Done Patient Decline   Little interest or pleasure in doing things Not at all   Feeling down, depressed, irritable, or hopeless Not at all   Total Score PHQ 2 0     Alcohol Risk Factor Screening: You do not drink alcohol or very rarely. Functional Ability and Level of Safety:     Hearing Loss   Hearing is good. Activities of Daily Living   Self-care. Requires assistance with: no ADLs    Fall Risk     Fall Risk Assessment, last 12 mths 7/23/2021   Able to walk? Yes   Fall in past 12 months? 1   Do you feel unsteady? 0   Are you worried about falling 0   Is the gait abnormal? 0   Number of falls in past 12 months 1   Fall with injury? 0     Abuse Screen   Patient is not abused    Review of Systems   Review of systems reported in the separate problem-oriented documentation. Physical Examination   Physical exam reported in the separate problem-oriented documentation. Evaluation of Cognitive Function:  Mood/affect:  happy  Appearance: age appropriate  Family member/caregiver input: No    Patient Care Team:  Frederic Garcia MD as PCP - General (Family Medicine)  Frederic Garcia MD as PCP - [facility] Empaneled Provider  Alhaji Bennett MD as Consulting Provider (Orthopedic Surgery)  Sylvain Wright MD (General Surgery)  Howard Warren MD (Vascular Surgery)  Julian Carrillo DO as Consulting Provider (Physical Medicine and Rehabilitation)  Ramya Dunn MD as Consulting Provider (Neurosurgery)  Bairon Mendieta MD as Consulting Provider (Cardiology)    Advice/Counseling   Education and counseling provided:  Advance directives counseling/discussion      Assessment/Plan       ICD-10-CM ICD-9-CM    1. Medicare annual wellness visit, subsequent  Z00.00 V70.0    2. Type 2 diabetes mellitus with diabetic polyneuropathy, without long-term current use of insulin (HCC)  E11.42 250.60  DIABETES FOOT EXAM     357.2 HEMOGLOBIN A1C WITH EAG   3.  Coronary artery disease involving native coronary artery of native heart without angina pectoris  I25.10 414.01    4. Essential hypertension  Z13 339.2 METABOLIC PANEL, BASIC   5. Hypercholesterolemia  E78.00 272.0    6. S/P AAA repair  Z98.890 V45.89     Z86.79     7. Primary osteoarthritis of right knee  M17.11 715.16    8. Obesity (BMI 30.0-34. 9)  E66.9 278.00    9. Elevated PSA, less than 10 ng/ml  R97.20 790.93 PSA, DIAGNOSTIC (PROSTATE SPECIFIC AG)     Health maintenance:      5-year personalized preventative services plan:  Complete and return advance directives form  Consider Tdap immunization to obtain tetanus prophylaxis and prophylaxis against whooping cough, available to pharmacy  Influenza immunization recommended fall 2021  Diabetic eye exam recommended every 2 years  Colonoscopy due May 2025  Medicare wellness evaluation due August 2022      Johanna Gardner was provided a written 5-year personalized preventive services plan, which was included in his AVS.    Sunni Colvin MD      PLEASE NOTE:   This document has been produced using voice recognition software. Unrecognized errors in transcription may be present.

## 2021-08-30 ENCOUNTER — OFFICE VISIT (OUTPATIENT)
Dept: FAMILY MEDICINE CLINIC | Age: 71
End: 2021-08-30
Payer: MEDICARE

## 2021-08-30 VITALS
WEIGHT: 245 LBS | DIASTOLIC BLOOD PRESSURE: 82 MMHG | OXYGEN SATURATION: 98 % | TEMPERATURE: 98 F | HEART RATE: 60 BPM | HEIGHT: 74 IN | RESPIRATION RATE: 16 BRPM | BODY MASS INDEX: 31.44 KG/M2 | SYSTOLIC BLOOD PRESSURE: 158 MMHG

## 2021-08-30 DIAGNOSIS — I10 ESSENTIAL HYPERTENSION: ICD-10-CM

## 2021-08-30 DIAGNOSIS — Z00.00 MEDICARE ANNUAL WELLNESS VISIT, SUBSEQUENT: Primary | ICD-10-CM

## 2021-08-30 DIAGNOSIS — Z86.79 S/P AAA REPAIR: ICD-10-CM

## 2021-08-30 DIAGNOSIS — E11.42 TYPE 2 DIABETES MELLITUS WITH DIABETIC POLYNEUROPATHY, WITHOUT LONG-TERM CURRENT USE OF INSULIN (HCC): ICD-10-CM

## 2021-08-30 DIAGNOSIS — I25.10 CORONARY ARTERY DISEASE INVOLVING NATIVE CORONARY ARTERY OF NATIVE HEART WITHOUT ANGINA PECTORIS: ICD-10-CM

## 2021-08-30 DIAGNOSIS — M17.11 PRIMARY OSTEOARTHRITIS OF RIGHT KNEE: ICD-10-CM

## 2021-08-30 DIAGNOSIS — E78.00 HYPERCHOLESTEROLEMIA: ICD-10-CM

## 2021-08-30 DIAGNOSIS — R97.20 ELEVATED PSA, LESS THAN 10 NG/ML: ICD-10-CM

## 2021-08-30 DIAGNOSIS — Z98.890 S/P AAA REPAIR: ICD-10-CM

## 2021-08-30 DIAGNOSIS — E66.9 OBESITY (BMI 30.0-34.9): ICD-10-CM

## 2021-08-30 PROCEDURE — 3017F COLORECTAL CA SCREEN DOC REV: CPT | Performed by: FAMILY MEDICINE

## 2021-08-30 PROCEDURE — G8753 SYS BP > OR = 140: HCPCS | Performed by: FAMILY MEDICINE

## 2021-08-30 PROCEDURE — 2022F DILAT RTA XM EVC RTNOPTHY: CPT | Performed by: FAMILY MEDICINE

## 2021-08-30 PROCEDURE — G8427 DOCREV CUR MEDS BY ELIG CLIN: HCPCS | Performed by: FAMILY MEDICINE

## 2021-08-30 PROCEDURE — G8417 CALC BMI ABV UP PARAM F/U: HCPCS | Performed by: FAMILY MEDICINE

## 2021-08-30 PROCEDURE — 3046F HEMOGLOBIN A1C LEVEL >9.0%: CPT | Performed by: FAMILY MEDICINE

## 2021-08-30 PROCEDURE — G0439 PPPS, SUBSEQ VISIT: HCPCS | Performed by: FAMILY MEDICINE

## 2021-08-30 PROCEDURE — 99213 OFFICE O/P EST LOW 20 MIN: CPT | Performed by: FAMILY MEDICINE

## 2021-08-30 PROCEDURE — 1101F PT FALLS ASSESS-DOCD LE1/YR: CPT | Performed by: FAMILY MEDICINE

## 2021-08-30 PROCEDURE — G8536 NO DOC ELDER MAL SCRN: HCPCS | Performed by: FAMILY MEDICINE

## 2021-08-30 PROCEDURE — G8510 SCR DEP NEG, NO PLAN REQD: HCPCS | Performed by: FAMILY MEDICINE

## 2021-08-30 PROCEDURE — G8754 DIAS BP LESS 90: HCPCS | Performed by: FAMILY MEDICINE

## 2021-08-30 NOTE — PROGRESS NOTES
Josephine Mendoza is a 65 y.o. male (: 1950) presenting to address:    Chief Complaint   Patient presents with    Annual Wellness Visit       Vitals:    21 0740 21 0751   BP: (!) 165/84 (!) 158/82   Pulse: 60    Resp: 16    Temp: 98 °F (36.7 °C)    TempSrc: Temporal    SpO2: 98%    Weight: 245 lb (111.1 kg)    Height: 6' 1.93\" (1.878 m)    PainSc:   0 - No pain        Hearing/Vision:      Hearing Screening    125Hz 250Hz 500Hz 1000Hz 2000Hz 3000Hz 4000Hz 6000Hz 8000Hz   Right ear:            Left ear:               Visual Acuity Screening    Right eye Left eye Both eyes   Without correction:      With correction: 20/25 20/40 20/25       Learning Assessment:     Learning Assessment 2020   PRIMARY LEARNER Patient   HIGHEST LEVEL OF EDUCATION - PRIMARY LEARNER  SOME COLLEGE   BARRIERS PRIMARY LEARNER NONE   CO-LEARNER CAREGIVER No   PRIMARY LANGUAGE ENGLISH    NEED No   LEARNER PREFERENCE PRIMARY DEMONSTRATION   ANSWERED BY patient   RELATIONSHIP SELF     Depression Screening:     3 most recent PHQ Screens 2021   PHQ Not Done -   Little interest or pleasure in doing things Not at all   Feeling down, depressed, irritable, or hopeless Not at all   Total Score PHQ 2 0     Fall Risk Assessment:     Fall Risk Assessment, last 12 mths 2021   Able to walk? Yes   Fall in past 12 months? 0   Do you feel unsteady? 0   Are you worried about falling 0   Is the gait abnormal? -   Number of falls in past 12 months -   Fall with injury? -     Abuse Screening:     Abuse Screening Questionnaire 2021   Do you ever feel afraid of your partner? N   Are you in a relationship with someone who physically or mentally threatens you? N   Is it safe for you to go home?  Y     ADL Assessment:     ADL Assessment 2021   Feeding yourself No Help Needed   Getting from bed to chair No Help Needed   Getting dressed No Help Needed   Bathing or showering No Help Needed   Walk across the room (includes cane/walker) No Help Needed   Using the telphone No Help Needed   Taking your medications No Help Needed   Preparing meals No Help Needed   Managing money (expenses/bills) No Help Needed   Moderately strenuous housework (laundry) No Help Needed   Shopping for personal items (toiletries/medicines) No Help Needed   Shopping for groceries No Help Needed   Driving No Help Needed   Climbing a flight of stairs No Help Needed   Getting to places beyond walking distances No Help Needed        Coordination of Care Questionaire:   1. Have you been to the ER, urgent care clinic since your last visit? Hospitalized since your last visit? NO    2. Have you seen or consulted any other health care providers outside of the [facility] [place] [address] [place] since your last visit? Include any pap smears or colon screening. YES Red Galo  Advanced Directive:   1. Do you have an Advanced Directive? YES    2. Would you like information on Advanced Directives?  NO

## 2021-08-30 NOTE — ACP (ADVANCE CARE PLANNING)
Advance Directives discussed. The patient verbalizes understanding of the concept and is provided with an Advanced Directives form to complete at home and return for entry in his medical record.   Primary decision-maker identified and noted in the patient's record

## 2021-08-30 NOTE — PATIENT INSTRUCTIONS
5-year personalized preventative services plan:  Complete and return advance directives form  Consider Tdap immunization to obtain tetanus prophylaxis and prophylaxis against whooping cough, available to pharmacy  Influenza immunization recommended fall 2021  Diabetic eye exam recommended every 2 years  Colonoscopy due May 2025  Medicare wellness evaluation due August 2022    Continue current medication and add Tradjenta 5 mg daily while increasing efforts at avoiding dietary starch and sugar and is much as possible following a program of regular aerobic exercise  Return for follow-up in 3 months preceded by lab appointment for hemoglobin A1c and repeat PSA level

## 2021-09-03 NOTE — TELEPHONE ENCOUNTER
Requested Prescriptions     Pending Prescriptions Disp Refills    linaGLIPtin (TRADJENTA) 5 mg PO tablet 90 Tablet 3     Sig: Take 1 Tablet by mouth daily. Pt's [facility] pharmacy does not carry this brand so he is requesting to have it sent to [facility]. Please advise.      Future Appointments   Date Time Provider Nora Blulard   11/26/2021  8:00 AM LAB_BS[facility] BS AMB   11/30/2021  8:00 AM Chelsea Mejia MD [facility] BS AMB

## 2021-09-07 RX ORDER — TELMISARTAN 40 MG/1
40 TABLET ORAL DAILY
Qty: 90 TABLET | Refills: 1 | Status: SHIPPED | OUTPATIENT
Start: 2021-09-07 | End: 2022-03-01 | Stop reason: SDUPTHER

## 2021-09-07 NOTE — TELEPHONE ENCOUNTER
Carla Nice called for their medication refill.     Last Office visit:  8/30/2021    Last Filled: 4/12/2021; Qty 90 w/ 0 refills     Follow up visit:    Future Appointments   Date Time Provider Nora Aleah   11/26/2021  8:00 AM LAB_NIR LOYOLA BS AMB   11/30/2021  8:00 AM Rosie Mejia MD [facility] BS AMB

## 2021-09-07 NOTE — TELEPHONE ENCOUNTER
Patient needs refill request!   He wants this one sent over to [facility]. Requested Prescriptions     Pending Prescriptions Disp Refills    telmisartan (Micardis) 40 mg tablet 90 Tablet 0     Sig: Take 1 Tablet by mouth daily.

## 2021-09-08 NOTE — TELEPHONE ENCOUNTER
Pt called back to see if Dr Brisa Busby would be able to send the script over to [address] [place] [address]. He stated that [facility] are asking several questions. But [facility] does have the generic form so he would not mind getting it from them. He stated that he did not want to override Dr Brisa Busby. He is completely out of medication. Pt would like a medication refill and a call to let him know if Dr Brisa Busby is able to send to [facility] for him.

## 2021-09-10 NOTE — TELEPHONE ENCOUNTER
Medication needed a prior Auth, completed by [employer]. Received, approval from [employer] that medication is covered until 12/31/2099.

## 2021-10-22 DIAGNOSIS — E11.42 TYPE 2 DIABETES MELLITUS WITH DIABETIC POLYNEUROPATHY, WITHOUT LONG-TERM CURRENT USE OF INSULIN (HCC): ICD-10-CM

## 2021-10-22 RX ORDER — METFORMIN HYDROCHLORIDE 1000 MG/1
TABLET ORAL
Qty: 180 TABLET | Refills: 3 | Status: SHIPPED | OUTPATIENT
Start: 2021-10-22

## 2021-11-23 ENCOUNTER — APPOINTMENT (OUTPATIENT)
Dept: FAMILY MEDICINE CLINIC | Age: 71
End: 2021-11-23

## 2021-11-23 ENCOUNTER — HOSPITAL ENCOUNTER (OUTPATIENT)
Dept: LAB | Age: 71
Discharge: HOME | End: 2021-11-23
Payer: MEDICARE

## 2021-11-23 DIAGNOSIS — E11.42 TYPE 2 DIABETES MELLITUS WITH DIABETIC POLYNEUROPATHY, WITHOUT LONG-TERM CURRENT USE OF INSULIN (HCC): ICD-10-CM

## 2021-11-23 DIAGNOSIS — I10 ESSENTIAL HYPERTENSION: ICD-10-CM

## 2021-11-23 DIAGNOSIS — R97.20 ELEVATED PSA, LESS THAN 10 NG/ML: ICD-10-CM

## 2021-11-23 LAB
ANION GAP SERPL CALC-SCNC: 7 MMOL/L (ref 3–18)
BUN SERPL-MCNC: 16 MG/DL (ref 7–18)
BUN/CREAT SERPL: 16 (ref 12–20)
CALCIUM SERPL-MCNC: 9.5 MG/DL (ref 8.5–10.1)
CHLORIDE SERPL-SCNC: 108 MMOL/L (ref 100–111)
CO2 SERPL-SCNC: 26 MMOL/L (ref 21–32)
CREAT SERPL-MCNC: 0.97 MG/DL (ref 0.6–1.3)
EST. AVERAGE GLUCOSE BLD GHB EST-MCNC: 151 MG/DL
GLUCOSE SERPL-MCNC: 159 MG/DL (ref 74–99)
HBA1C MFR BLD: 6.9 % (ref 4.2–5.6)
POTASSIUM SERPL-SCNC: 4.2 MMOL/L (ref 3.5–5.5)
PSA SERPL-MCNC: 8 NG/ML (ref 0–4)
SODIUM SERPL-SCNC: 141 MMOL/L (ref 136–145)

## 2021-11-23 PROCEDURE — 83036 HEMOGLOBIN GLYCOSYLATED A1C: CPT

## 2021-11-23 PROCEDURE — 36415 COLL VENOUS BLD VENIPUNCTURE: CPT

## 2021-11-23 PROCEDURE — 84153 ASSAY OF PSA TOTAL: CPT

## 2021-11-23 PROCEDURE — 80048 BASIC METABOLIC PNL TOTAL CA: CPT

## 2021-11-29 NOTE — PROGRESS NOTES
HISTORY OF PRESENT ILLNESS  Delroy Jenkins is a 66 y.o. male. He presents for follow-up after evaluation on 8/28/2021 revealed marked worsening in control of type 2 diabetes and unsatisfactory blood pressure control with a history of coronary artery disease and a PSA level of 4.1 with known prior to that since 2014. He was advised to continue current medication and add Tradjenta 5 mg daily while increasing efforts at avoiding dietary starch and sugar and as much as possible following a program of regular aerobic exercise  Monitor blood pressures, record readings a few times each week and bring these to the next appointment in 3 months  Return for follow-up in 3 months preceded by lab appointment for hemoglobin A1c and repeat PSA level    Mr#: [id number]      Past Medical History:   Diagnosis Date    Abrasion of skin 1/25/2016    Acid reflux     ACP (advance care planning)     has not yet filled out ACP, discussed 4/1/16    ACP (advance care planning)     has not yet filled out ACP, discussed 4/1/16     Aneurysm ([address]     Aortic root dilation ([address]     Borderline on echo June 8 2015 (done through [facility])    Arthritis     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 No femoral head collapse identified.     Back pain, thoracic     Bilateral arm pain 1/25/2016    Bilateral low back pain without sciatica 1/25/2016    BMI 33.0-33.9,adult     Bone lesion left femur     Asymptomatic, MRI planned for October    Brachial neuritis 3/24/2014    Carpal tunnel syndrome, bilateral     Cervical spondylosis     C4-C6    Chest pain 12/15/2017    Chronic pain     Compression fracture of thoracic vertebra ([address] 1/2016    Coronary artery disease involving native coronary artery of native heart without angina pectoris     DDD (degenerative disc disease), lumbosacral     DDD (degenerative disc disease), lumbosacral     Decreased libido 3/19/2014    3/19/2014:  Lack of interest recently -- Check testosterone levels, doubt this has anything to do with hx of aortic aneurysm surgery by Dr. Zakiya Dickens disorder     Diabetes ([facility]     Elevated transaminase level     AST and ALT     Essential hypertension     Fall through floor 1/25/2016    GERD (gastroesophageal reflux disease)     Herniated nucleus pulposus     Lumbar diskectomy 1980, C4/5 HNP, has gotten ESIs in the past    History of poliomyelitis     History of shingles 7/2013    Right flank/back, after having vaccine    Hypercholesterolemia     Hypoglycemia 5/3/2017    Hypogonadism male     Impingement syndrome of left shoulder 8/2013    Internal hemorrhoids     Long term (current) use of anticoagulants     baby asa    Low serum testosterone level 3/24/2014    3/21/14:  Serum testosterone 179 (L), free testosterone 5.1 (L) -- Referring to urology to discuss prostate screening and options for repletion     Normal cardiac stress test 9/18/2012 9/18/2012 -- Normal Lexiscan cardiac stress and rest perfusion study, EF 62% (read by Dr. Ashley Weaver, [facility]), Cony Novel 9/21/2012 -- Holter monitor -- Virgie Kasal 44%, tachy 0%, no arrhythmias detected, some premature atrial and ventricular beats 6/17/2010 -- Normal Lexiscan cardiac stress and rest perfusion study, EF 55% (read by Dr. Enrrique Hernandez) [place]     NELSON (obstructive sleep apnea)     Unable to tolerate CPAP    Osteoarthritis of right knee 11/13/2013    Xrays ([facility]) R knee - 11/12/13 -- Mild medial compartment DJD -- s/p steroid injection Nov 2013 [Dr. Roman Walker which resolved knee pain after about a week     Pulmonary nodule, left 11/21/2013    Single 0.5 cm posterior lateral aspect of left lower lobe pleural-based pulmonary nodule; Incidental finding, stable from 11/2008 CT to 6/1/2010 CT     Restless leg syndrome     Routine adult health maintenance 11/21/2013    -- Colon cancer:   Colonoscopy done 11/29/2005 at [facility] -- Vaccinations:   Herpes Zoster vaccine given 2012 at [facility]    S/P AAA repair     Dr. Paul Coleman following, q3 year duplex of aorta    Seasonal allergic reaction     Spinal stenosis in cervical region 3/24/2014    Type 2 diabetes mellitus, controlled ([address]        Past Surgical History:   Procedure Laterality Date    HX LUMBAR DISKECTOMY N/A 1980    HX ORTHOPAEDIC      left shoulder    VASCULAR SURGERY PROCEDURE UNLIST N/A     AAA repair with stent, US's at [facility]       Family History   Problem Relation Age of Onset    Cancer Mother         Rectal, metastasized late 76s    Heart Disease Father     Cancer Brother         Metastatic, not sure of primary    Cancer Maternal Uncle     Cancer Maternal Aunt        Allergies   Allergen Reactions    Zocor [Simvastatin] Myalgia       Social History     Tobacco Use   Smoking Status Former Smoker    Types: Cigarettes, Pipe   Smokeless Tobacco Former User   Tobacco Comment    Quit 43 years ago (2015), pipe 10+ years ago       Social History     Substance and Sexual Activity   Alcohol Use Yes    Alcohol/week: 0.0 standard drinks    Comment: Occasional beer, liquor or wine.   No heavy use           Patient Active Problem List   Diagnosis Code    Type 2 diabetes mellitus, without long-term current use of insulin (HCC) E11.9    Hypercholesterolemia E78.00    Arthritis M19.90    Acid reflux K21.9    Restless leg syndrome G25.81    Multilevel degenerative disc disease M53.9    Hypogonadism male E29.1    NELSON (obstructive sleep apnea) G47.33    Essential hypertension I10    BMI 33.0-33.9,adult Z68.33    S/P AAA repair Z98.890, Z86.79    Decreased GFR R94.4    Chronic seasonal allergic rhinitis due to pollen J30.1    Tinnitus of both ears H93.13    Thoracic compression fracture ([facility] S22.000A    Bone lesion left femur M89.9    Diabetic polyneuropathy associated with type 2 diabetes mellitus ([facility] E11.42    Symptomatic bradycardia R00.1    Coronary artery disease involving native coronary artery of native heart without angina pectoris I25.10    Constipation K59.00    History of colonoscopy with polypectomy Z98.890, Z86.010         Current Outpatient Medications:     atorvastatin (LIPITOR) 80 mg tablet, TAKE 1 TABLET BY MOUTH ONCE DAILY, Disp: 90 Tablet, Rfl: 4    TURMERIC PO, Take 500 mg by mouth two (2) times a day., Disp: , Rfl:     metFORMIN (GLUCOPHAGE) 1,000 mg tablet, TAKE 1 TABLET BY MOUTH TWICE DAILY WITH MEALS, Disp: 180 Tablet, Rfl: 3    linaGLIPtin (TRADJENTA) 5 mg tablet, Take 1 Tablet by mouth daily. , Disp: 90 Tablet, Rfl: 3    telmisartan (Micardis) 40 mg tablet, Take 1 Tablet by mouth daily. , Disp: 90 Tablet, Rfl: 1    glucose blood VI test strips (FreeStyle Lite Strips) strip, ICD10 E11.65 - DM2 daily BG testing., Disp: 100 Strip, Rfl: 3    aspirin delayed-release 81 mg tablet, Take  by mouth daily. , Disp: , Rfl:     Blood-Glucose Meter misc, Uncontrolled DM2 E11.65. BID testing. Disp 1 meter, Disp: 1 Each, Rfl: 0    Lancets misc, Uncontrolled DM2. BID testing. Disp 2 boxes of 100 each with 3 refills for 90 day supply. , Disp: 2 Package, Rfl: 3    Diabetic Supplies, [address] (LANCING DEVICE WITH LANCETS) misc, DM2 uncontrolled - BG testing BID., Disp: 1 Each, Rfl: 0    olopatadine (PATANOL) 0.1 % ophthalmic solution, Administer 2 Drops to both eyes two (2) times daily as needed for Allergies. (Patient taking differently: Administer 2 Drops to both eyes two (2) times a day.), Disp: 15 mL, Rfl: 3    omega-3 fatty acids-vitamin e (FISH OIL) 1,000 mg cap, Take 1 Cap by mouth two (2) times a day., Disp: 180 Cap, Rfl: 3         Review of Systems   Constitutional: Negative for fever and weight loss.    Cardiovascular: Negative for chest pain, palpitations and leg swelling. Genitourinary: Negative for dysuria, frequency, hematuria and urgency. Neurological: Negative for dizziness, tingling and headaches. Endo/Heme/Allergies: Negative for polydipsia. Visit Vitals  BP (!) 140/72   Pulse (!) 59   Temp 97.9 °F (36.6 °C) (Temporal)   Resp 16   Ht 6' 1.93\" (1.878 m)   Wt 249 lb (112.9 kg)   SpO2 97%   BMI 32.03 kg/m²       Physical Exam  Constitutional:       General: He is not in acute distress. Appearance: Normal appearance. He is obese. He is not ill-appearing. HENT:      Head: Normocephalic. Eyes:      Extraocular Movements: Extraocular movements intact. Cardiovascular:      Rate and Rhythm: Normal rate and regular rhythm. Pulmonary:      Effort: Pulmonary effort is normal.      Breath sounds: Normal breath sounds. Skin:     General: Skin is warm and dry. Neurological:      Mental Status: He is alert. Mental status is at baseline. Psychiatric:         Mood and Affect: Mood normal.         Behavior: Behavior normal.         Thought Content: Thought content normal.     Results for Wing Avers (MRN [id number]) as of 11/29/2021 17:00   Ref. Range 7/16/2018 07:49 3/20/2019 07:35 8/26/2020 08:33 8/27/2021 07:16 11/23/2021 08:00   Hemoglobin A1c, (calculated) Latest Ref Range: 4.2 - 5.6 % 7.3 (H) 7.7 (H) 7.2 (H) 9.1 (H) 6.9 (H)     Results for Wing Avers (MRN [id number]) as of 11/29/2021 17:00   Ref. Range 8/27/2021 07:16 11/23/2021 08:00   Prostate Specific Ag Latest Ref Range: 0.0 - 4.0 ng/mL 4.1 (H) 8.0 (H)   Results for Wing Avers (MRN [id number]) as of 11/29/2021 17:00   Ref.  Range 8/27/2021 07:16 11/23/2021 08:00   Sodium Latest Ref Range: 136 - 145 mmol/L 140 141   Potassium Latest Ref Range: 3.5 - 5.5 mmol/L 4.2 4.2   Chloride Latest Ref Range: 100 - 111 mmol/L 109 108   CO2 Latest Ref Range: 21 - 32 mmol/L 28 26   Anion gap Latest Ref Range: 3.0 - 18 mmol/L 3 7   Glucose Latest Ref Range: 74 - 99 mg/dL 173 (H) 159 (H) BUN Latest Ref Range: 7.0 - 18 MG/DL 13 16   Creatinine Latest Ref Range: 0.6 - 1.3 MG/DL 0.91 0.97   BUN/Creatinine ratio Latest Ref Range: 12 - 20   14 16   Calcium Latest Ref Range: 8.5 - 10.1 MG/DL 9.0 9.5   GFR est non-AA Latest Ref Range: >60 ml/min/1.73m2 >60 >60     ASSESSMENT and PLAN    ICD-10-CM ICD-9-CM    1. Type 2 diabetes mellitus with diabetic polyneuropathy, without long-term current use of insulin (HCC)  E11.42 250.60      357.2    2. Essential hypertension  I10 401.9    3. Hypercholesterolemia  E78.00 272.0    4. Coronary artery disease involving native coronary artery of native heart without angina pectoris  I25.10 414.01    5. Elevated PSA, less than 10 ng/ml  R97.20 790.93 REFERRAL TO UROLOGY   Assessment:  Type 2 diabetes again in good control  Hypertension mildly elevated today but home levels show good control  Lipid levels satisfactory 3 months ago  Significant increase in PSA    Plan:  Continue current medications  Urology referral  Return for follow-up with an office hemoglobin A1c in 3 months  Follow-up sooner with any problems    Austen Johnston MD      PLEASE NOTE:   This document has been produced using voice recognition software. Unrecognized errors in transcription may be present.

## 2021-11-30 ENCOUNTER — OFFICE VISIT (OUTPATIENT)
Dept: FAMILY MEDICINE CLINIC | Age: 71
End: 2021-11-30
Payer: MEDICARE

## 2021-11-30 VITALS
BODY MASS INDEX: 31.95 KG/M2 | RESPIRATION RATE: 16 BRPM | OXYGEN SATURATION: 97 % | SYSTOLIC BLOOD PRESSURE: 140 MMHG | HEIGHT: 74 IN | TEMPERATURE: 97.9 F | HEART RATE: 59 BPM | DIASTOLIC BLOOD PRESSURE: 72 MMHG | WEIGHT: 249 LBS

## 2021-11-30 DIAGNOSIS — I10 ESSENTIAL HYPERTENSION: ICD-10-CM

## 2021-11-30 DIAGNOSIS — E78.00 HYPERCHOLESTEROLEMIA: ICD-10-CM

## 2021-11-30 DIAGNOSIS — E11.42 TYPE 2 DIABETES MELLITUS WITH DIABETIC POLYNEUROPATHY, WITHOUT LONG-TERM CURRENT USE OF INSULIN (HCC): Primary | ICD-10-CM

## 2021-11-30 DIAGNOSIS — R97.20 ELEVATED PSA, LESS THAN 10 NG/ML: ICD-10-CM

## 2021-11-30 DIAGNOSIS — I25.10 CORONARY ARTERY DISEASE INVOLVING NATIVE CORONARY ARTERY OF NATIVE HEART WITHOUT ANGINA PECTORIS: ICD-10-CM

## 2021-11-30 PROCEDURE — G8753 SYS BP > OR = 140: HCPCS | Performed by: FAMILY MEDICINE

## 2021-11-30 PROCEDURE — G8510 SCR DEP NEG, NO PLAN REQD: HCPCS | Performed by: FAMILY MEDICINE

## 2021-11-30 PROCEDURE — 1101F PT FALLS ASSESS-DOCD LE1/YR: CPT | Performed by: FAMILY MEDICINE

## 2021-11-30 PROCEDURE — 99214 OFFICE O/P EST MOD 30 MIN: CPT | Performed by: FAMILY MEDICINE

## 2021-11-30 PROCEDURE — G8427 DOCREV CUR MEDS BY ELIG CLIN: HCPCS | Performed by: FAMILY MEDICINE

## 2021-11-30 PROCEDURE — 3044F HG A1C LEVEL LT 7.0%: CPT | Performed by: FAMILY MEDICINE

## 2021-11-30 PROCEDURE — G8536 NO DOC ELDER MAL SCRN: HCPCS | Performed by: FAMILY MEDICINE

## 2021-11-30 PROCEDURE — 3017F COLORECTAL CA SCREEN DOC REV: CPT | Performed by: FAMILY MEDICINE

## 2021-11-30 PROCEDURE — G8754 DIAS BP LESS 90: HCPCS | Performed by: FAMILY MEDICINE

## 2021-11-30 PROCEDURE — 2022F DILAT RTA XM EVC RTNOPTHY: CPT | Performed by: FAMILY MEDICINE

## 2021-11-30 PROCEDURE — G8417 CALC BMI ABV UP PARAM F/U: HCPCS | Performed by: FAMILY MEDICINE

## 2021-11-30 RX ORDER — ATORVASTATIN CALCIUM 80 MG/1
TABLET, FILM COATED ORAL
Qty: 90 TABLET | Refills: 4 | Status: SHIPPED | OUTPATIENT
Start: 2021-11-30

## 2021-11-30 NOTE — PROGRESS NOTES
Sasha Ramey is a 66 y.o. male (: 1950) presenting to address:    Chief Complaint   Patient presents with    Hypertension    Diabetes     mid to low 100's had 170 but that was after eating something . Vitals:    21 0753 21 0756   BP: (!) 140/80 (!) 140/72   Pulse: (!) 59    Resp: 16    Temp: 97.9 °F (36.6 °C)    TempSrc: Temporal    SpO2: 97%    Weight: 249 lb (112.9 kg)    Height: 6' 1.93\" (1.878 m)    PainSc:   0 - No pain        Hearing/Vision:   No exam data present    Learning Assessment:     Learning Assessment 2020   PRIMARY LEARNER Patient   HIGHEST LEVEL OF EDUCATION - PRIMARY LEARNER  SOME COLLEGE   BARRIERS PRIMARY LEARNER NONE   CO-LEARNER CAREGIVER No   PRIMARY LANGUAGE ENGLISH    NEED No   LEARNER PREFERENCE PRIMARY DEMONSTRATION   ANSWERED BY patient   RELATIONSHIP SELF     Depression Screening:     3 most recent PHQ Screens 2021   PHQ Not Done -   Little interest or pleasure in doing things Not at all   Feeling down, depressed, irritable, or hopeless Not at all   Total Score PHQ 2 0     Fall Risk Assessment:     Fall Risk Assessment, last 12 mths 2021   Able to walk? Yes   Fall in past 12 months? 0   Do you feel unsteady? 0   Are you worried about falling 0   Is the gait abnormal? -   Number of falls in past 12 months -   Fall with injury? -     Abuse Screening:     Abuse Screening Questionnaire 2021   Do you ever feel afraid of your partner? N   Are you in a relationship with someone who physically or mentally threatens you? N   Is it safe for you to go home?  Y     ADL Assessment:     ADL Assessment 2021   Feeding yourself No Help Needed   Getting from bed to chair No Help Needed   Getting dressed No Help Needed   Bathing or showering No Help Needed   Walk across the room (includes cane/walker) No Help Needed   Using the telphone No Help Needed   Taking your medications No Help Needed   Preparing meals No Help Needed   Managing money (expenses/bills) No Help Needed   Moderately strenuous housework (laundry) No Help Needed   Shopping for personal items (toiletries/medicines) No Help Needed   Shopping for groceries No Help Needed   Driving No Help Needed   Climbing a flight of stairs No Help Needed   Getting to places beyond walking distances No Help Needed        Coordination of Care Questionaire:   1. \"Have you been to the ER, urgent care clinic since your last visit? Hospitalized since your last visit? \" No    2. \"Have you seen or consulted any other health care providers outside of the [address] [place] [address] [place] since your last visit? \" No     3. For patients aged 39-70: Has the patient had a colonoscopy? Yes, HM satisfied with blue hyperlink         Advanced Directive:   1. Do you have an Advanced Directive? NO    2. Would you like information on Advanced Directives?  NO

## 2021-11-30 NOTE — PATIENT INSTRUCTIONS
Continue current medications  Urology referral  Return for follow-up with an office hemoglobin A1c in 3 months  Follow-up sooner with any problems

## 2022-02-28 NOTE — PROGRESS NOTES
HISTORY OF PRESENT ILLNESS  Jason Law is a 66 y.o. male. He returns for follow-up of type 2 diabetes with a history of hyperlipidemia, hypertension coronary artery disease and elevated PSA. He had a transient decrease in diabetic control which was improved 3 months ago. He is recently status post urology consultation with plans to schedule prostate biopsy      Mr#: [id number]      Past Medical History:   Diagnosis Date    Abrasion of skin 1/25/2016    Acid reflux     ACP (advance care planning)     has not yet filled out ACP, discussed 4/1/16    ACP (advance care planning)     has not yet filled out ACP, discussed 4/1/16     Aneurysm ([address]     Aortic root dilation ([address]     Borderline on echo June 8 2015 (done through [facility])    Arthritis     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 No femoral head collapse identified.     Back pain, thoracic     Bilateral arm pain 1/25/2016    Bilateral low back pain without sciatica 1/25/2016    BMI 33.0-33.9,adult     Bone lesion left femur     Asymptomatic, MRI planned for October    Brachial neuritis 3/24/2014    Carpal tunnel syndrome, bilateral     Cervical spondylosis     C4-C6    Chest pain 12/15/2017    Chronic pain     Compression fracture of thoracic vertebra ([address] 1/2016    Coronary artery disease involving native coronary artery of native heart without angina pectoris     DDD (degenerative disc disease), lumbosacral     DDD (degenerative disc disease), lumbosacral     Decreased libido 3/19/2014    3/19/2014:  Lack of interest recently -- Check testosterone levels, doubt this has anything to do with hx of aortic aneurysm surgery by Dr. Candace Cano disorder     Diabetes ([address]     Elevated transaminase level     AST and ALT     Essential hypertension     Fall through floor 1/25/2016    GERD (gastroesophageal reflux disease)     Herniated nucleus pulposus     Lumbar diskectomy 1980, C4/5 HNP, has gotten ESIs in the past    History of poliomyelitis     History of shingles 7/2013    Right flank/back, after having vaccine    Hypercholesterolemia     Hypertension     Hypoglycemia 5/3/2017    Hypogonadism male     Impingement syndrome of left shoulder 8/2013    Internal hemorrhoids     Long term (current) use of anticoagulants     baby asa    Low serum testosterone level 3/24/2014    3/21/14:  Serum testosterone 179 (L), free testosterone 5.1 (L) -- Referring to urology to discuss prostate screening and options for repletion     Normal cardiac stress test 9/18/2012 9/18/2012 -- Normal Lexiscan cardiac stress and rest perfusion study, EF 62% (read by Dr. Lisbet Fregoso, [facility]), Joyce Makenzie 9/21/2012 -- Holter monitor -- Pradip Scriver 44%, tachy 0%, no arrhythmias detected, some premature atrial and ventricular beats 6/17/2010 -- Normal Lexiscan cardiac stress and rest perfusion study, EF 55% (read by Dr. Kathy Arcos) [facility] [place]     NELSON (obstructive sleep apnea)     Unable to tolerate CPAP    Osteoarthritis of right knee 11/13/2013    Xrays ([facility]) R knee - 11/12/13 -- Mild medial compartment DJD -- s/p steroid injection Nov 2013 [Dr. Monteiro Gun which resolved knee pain after about a week     Pulmonary nodule, left 11/21/2013    Single 0.5 cm posterior lateral aspect of left lower lobe pleural-based pulmonary nodule;  Incidental finding, stable from 11/2008 CT to 6/1/2010 CT     Restless leg syndrome     Routine adult health maintenance 11/21/2013    -- Colon cancer:   Colonoscopy done 11/29/2005 at [facility] -- Vaccinations:   Herpes Zoster vaccine given 2012 at [facility]    S/P AAA repair     Dr. Duane Cifuentes following, q3 year duplex of aorta    Seasonal allergic reaction     Sleep apnea     Spinal stenosis in cervical region 3/24/2014    Type 2 diabetes mellitus, controlled ([facility]        Past Surgical History:   Procedure Laterality Date    HX LUMBAR DISKECTOMY N/A 1980    HX ORTHOPAEDIC      left shoulder    HX SHOULDER ARTHROSCOPY  01/19/2015    Arthroscopy shoulder (Left) Procedure: ARTHROSCOPY SHOULDER ROTATOR CUFF REPAIR;  Surgeon: Nasir Langford MD;  Location: [facility] OR LOC; Service: Orthopedics; Laterality: Left;  Left Shoulder Arthroscopy, Subacromial Decompression, bicep tenotomy, debridment, yvette     VASCULAR SURGERY PROCEDURE UNLIST N/A     AAA repair with stent, US's at [facility]       Family History   Problem Relation Age of Onset   Sony Bark Cancer Mother         Rectal, metastasized late 76s    Heart Disease Father     Cancer Brother         Metastatic, not sure of primary    Cancer Maternal Uncle     Cancer Maternal Aunt        Allergies   Allergen Reactions    Zocor [Simvastatin] Myalgia       Social History     Tobacco Use   Smoking Status Former Smoker    Types: Cigarettes, Pipe   Smokeless Tobacco Former User   Tobacco Comment    Quit 43 years ago (2015), pipe 10+ years ago       Social History     Substance and Sexual Activity   Alcohol Use Yes    Alcohol/week: 0.0 standard drinks    Comment: Occasional beer, liquor or wine.   No heavy use           Patient Active Problem List   Diagnosis Code    Type 2 diabetes mellitus, without long-term current use of insulin ([facility]) E11.9    Hypercholesterolemia E78.00    Arthritis M19.90    Acid reflux K21.9    Restless leg syndrome G25.81    Multilevel degenerative disc disease M53.9    Hypogonadism male E29.1    NASIR (obstructive sleep apnea) G47.33    Essential hypertension I10    BMI 33.0-33.9,adult Z68.33    S/P AAA repair Z98.890, Z86.79    Decreased GFR R94.4    Chronic seasonal allergic rhinitis due to pollen J30.1    Tinnitus of both ears H93.13    Thoracic compression fracture ([facility]) S22.000A    Bone lesion left femur M89.9    Diabetic polyneuropathy associated with type 2 diabetes mellitus ([facility] E11.42    Symptomatic bradycardia R00.1    Coronary artery disease involving native coronary artery of native heart without angina pectoris I25.10    Constipation K59.00    History of colonoscopy with polypectomy Z98.890, Z86.010         Current Outpatient Medications:     glipiZIDE (GlucotroL) 5 mg tablet, Take  by mouth daily. , Disp: , Rfl:     atorvastatin (LIPITOR) 80 mg tablet, TAKE 1 TABLET BY MOUTH ONCE DAILY, Disp: 90 Tablet, Rfl: 4    TURMERIC PO, Take 500 mg by mouth two (2) times a day., Disp: , Rfl:     metFORMIN (GLUCOPHAGE) 1,000 mg tablet, TAKE 1 TABLET BY MOUTH TWICE DAILY WITH MEALS, Disp: 180 Tablet, Rfl: 3    linaGLIPtin (TRADJENTA) 5 mg tablet, Take 1 Tablet by mouth daily. , Disp: 90 Tablet, Rfl: 3    telmisartan (Micardis) 40 mg tablet, Take 1 Tablet by mouth daily. , Disp: 90 Tablet, Rfl: 1    glucose blood VI test strips (FreeStyle Lite Strips) strip, ICD10 E11.65 - DM2 daily BG testing., Disp: 100 Strip, Rfl: 3    aspirin delayed-release 81 mg tablet, Take  by mouth daily. , Disp: , Rfl:     Blood-Glucose Meter misc, Uncontrolled DM2 E11.65. BID testing. Disp 1 meter, Disp: 1 Each, Rfl: 0    Lancets misc, Uncontrolled DM2. BID testing. Disp 2 boxes of 100 each with 3 refills for 90 day supply. , Disp: 2 Package, Rfl: 3    Diabetic Supplies, [address] (LANCING DEVICE WITH LANCETS) misc, DM2 uncontrolled - BG testing BID., Disp: 1 Each, Rfl: 0    olopatadine (PATANOL) 0.1 % ophthalmic solution, Administer 2 Drops to both eyes two (2) times daily as needed for Allergies. (Patient taking differently: Administer 2 Drops to both eyes two (2) times a day.), Disp: 15 mL, Rfl: 3    omega-3 fatty acids-vitamin e (FISH OIL) 1,000 mg cap, Take 1 Cap by mouth two (2) times a day., Disp: 180 Cap, Rfl: 3           Review of Systems   Constitutional: Negative for fever and weight loss.    Eyes: Negative for blurred vision. Respiratory: Negative for shortness of breath. Cardiovascular: Negative for chest pain, palpitations and leg swelling. Genitourinary: Negative for frequency. Neurological: Negative for tingling. Endo/Heme/Allergies: Negative for polydipsia. Visit Vitals  BP (!) 140/80   Pulse (!) 52   Temp (!) 96.7 °F (35.9 °C) (Temporal)   Resp 16   Ht 6' (1.829 m)   Wt 245 lb (111.1 kg)   SpO2 98%   BMI 33.23 kg/m²       Physical Exam  Vitals and nursing note reviewed. Constitutional:       General: He is not in acute distress. Appearance: Normal appearance. He is well-developed. He is obese. He is not ill-appearing. HENT:      Head: Normocephalic. Cardiovascular:      Rate and Rhythm: Normal rate. Pulmonary:      Effort: Pulmonary effort is normal.   Neurological:      Mental Status: He is alert and oriented to person, place, and time. Psychiatric:         Mood and Affect: Mood normal.         Behavior: Behavior normal.         Thought Content: Thought content normal.         ASSESSMENT and PLAN    ICD-10-CM ICD-9-CM    1. Type 2 diabetes mellitus with diabetic polyneuropathy, without long-term current use of insulin (HCC)  E11.42 250.60 AMB POC HEMOGLOBIN A1C     357.2 HEMOGLOBIN A1C WITH EAG      MICROALBUMIN, UR, RAND W/ MICROALB/CREAT RATIO   2. Hypercholesterolemia  E78.00 272.0 TSH 3RD GENERATION      LIPID PANEL   3. Essential hypertension  I10 401.9 CBC WITH AUTOMATED DIFF      URINALYSIS W/ RFLX MICROSCOPIC      METABOLIC PANEL, COMPREHENSIVE   4. Coronary artery disease involving native coronary artery of native heart without angina pectoris  I25.10 414.01    5.  Elevated PSA, less than 10 ng/ml  R97.20 790.93    Assessment:  Type 2 diabetes still well controlled  Hypertension, probably adequately controlled, has not taken medication today  Lipid levels satisfactory 8/2021 to be rechecked 8/2022  No symptoms of cardiac ischemia  Elevated PSA followed by urology with plans for prostate biopsy    Plan:  Continue current medications  Avoid dietary salt, starch and sugar and follow program of regular aerobic exercise  Return for lab appointment followed by Medicare wellness evaluation in August or sooner with any problems    Mart Mota MD      PLEASE NOTE:   This document has been produced using voice recognition software. Unrecognized errors in transcription may be present.

## 2022-03-01 ENCOUNTER — OFFICE VISIT (OUTPATIENT)
Dept: FAMILY MEDICINE CLINIC | Age: 72
End: 2022-03-01
Payer: MEDICARE

## 2022-03-01 VITALS
BODY MASS INDEX: 33.18 KG/M2 | OXYGEN SATURATION: 98 % | DIASTOLIC BLOOD PRESSURE: 80 MMHG | TEMPERATURE: 96.7 F | SYSTOLIC BLOOD PRESSURE: 140 MMHG | WEIGHT: 245 LBS | HEIGHT: 72 IN | HEART RATE: 52 BPM | RESPIRATION RATE: 16 BRPM

## 2022-03-01 DIAGNOSIS — E11.42 TYPE 2 DIABETES MELLITUS WITH DIABETIC POLYNEUROPATHY, WITHOUT LONG-TERM CURRENT USE OF INSULIN (HCC): Primary | ICD-10-CM

## 2022-03-01 DIAGNOSIS — E78.00 HYPERCHOLESTEROLEMIA: ICD-10-CM

## 2022-03-01 DIAGNOSIS — I10 ESSENTIAL HYPERTENSION: ICD-10-CM

## 2022-03-01 DIAGNOSIS — I25.10 CORONARY ARTERY DISEASE INVOLVING NATIVE CORONARY ARTERY OF NATIVE HEART WITHOUT ANGINA PECTORIS: ICD-10-CM

## 2022-03-01 DIAGNOSIS — R97.20 ELEVATED PSA, LESS THAN 10 NG/ML: ICD-10-CM

## 2022-03-01 LAB — HBA1C MFR BLD HPLC: 7 %

## 2022-03-01 PROCEDURE — 3051F HG A1C>EQUAL 7.0%<8.0%: CPT | Performed by: FAMILY MEDICINE

## 2022-03-01 PROCEDURE — G8417 CALC BMI ABV UP PARAM F/U: HCPCS | Performed by: FAMILY MEDICINE

## 2022-03-01 PROCEDURE — G8510 SCR DEP NEG, NO PLAN REQD: HCPCS | Performed by: FAMILY MEDICINE

## 2022-03-01 PROCEDURE — 83036 HEMOGLOBIN GLYCOSYLATED A1C: CPT | Performed by: FAMILY MEDICINE

## 2022-03-01 PROCEDURE — G8427 DOCREV CUR MEDS BY ELIG CLIN: HCPCS | Performed by: FAMILY MEDICINE

## 2022-03-01 PROCEDURE — 3017F COLORECTAL CA SCREEN DOC REV: CPT | Performed by: FAMILY MEDICINE

## 2022-03-01 PROCEDURE — G8536 NO DOC ELDER MAL SCRN: HCPCS | Performed by: FAMILY MEDICINE

## 2022-03-01 PROCEDURE — 99213 OFFICE O/P EST LOW 20 MIN: CPT | Performed by: FAMILY MEDICINE

## 2022-03-01 PROCEDURE — 2022F DILAT RTA XM EVC RTNOPTHY: CPT | Performed by: FAMILY MEDICINE

## 2022-03-01 PROCEDURE — 1101F PT FALLS ASSESS-DOCD LE1/YR: CPT | Performed by: FAMILY MEDICINE

## 2022-03-01 PROCEDURE — G8754 DIAS BP LESS 90: HCPCS | Performed by: FAMILY MEDICINE

## 2022-03-01 PROCEDURE — G8753 SYS BP > OR = 140: HCPCS | Performed by: FAMILY MEDICINE

## 2022-03-01 RX ORDER — GLIPIZIDE 5 MG/1
TABLET ORAL DAILY
COMMUNITY
End: 2022-05-04

## 2022-03-01 RX ORDER — TELMISARTAN 40 MG/1
40 TABLET ORAL DAILY
Qty: 90 TABLET | Refills: 4 | Status: SHIPPED
Start: 2022-03-01 | End: 2022-05-04 | Stop reason: DRUGHIGH

## 2022-03-01 RX ORDER — BLOOD-GLUCOSE METER
KIT MISCELLANEOUS
Qty: 100 STRIP | Refills: 3 | Status: SHIPPED | OUTPATIENT
Start: 2022-03-01

## 2022-03-01 NOTE — PATIENT INSTRUCTIONS
Continue current medications  Avoid dietary salt, starch and sugar and follow program of regular aerobic exercise  Return for lab appointment followed by Medicare wellness evaluation in August or sooner with any problems

## 2022-03-01 NOTE — PROGRESS NOTES
Sandra King is a 66 y.o. male (: 1950) presenting to address:    Chief Complaint   Patient presents with    Diabetes       Vitals:    22 0810   BP: (!) 140/80   Pulse: (!) 52   Resp: 16   Temp: (!) 96.7 °F (35.9 °C)   TempSrc: Temporal   SpO2: 98%   Weight: 245 lb (111.1 kg)   Height: 6' (1.829 m)   PainSc:   0 - No pain       Hearing/Vision:   No exam data present    Learning Assessment:     Learning Assessment 2020   PRIMARY LEARNER Patient   HIGHEST LEVEL OF EDUCATION - PRIMARY LEARNER  SOME COLLEGE   BARRIERS PRIMARY LEARNER NONE   CO-LEARNER CAREGIVER No   PRIMARY LANGUAGE ENGLISH    NEED No   LEARNER PREFERENCE PRIMARY DEMONSTRATION   ANSWERED BY patient   RELATIONSHIP SELF     Depression Screening:     3 most recent PHQ Screens 3/1/2022   PHQ Not Done -   Little interest or pleasure in doing things Not at all   Feeling down, depressed, irritable, or hopeless Not at all   Total Score PHQ 2 0     Fall Risk Assessment:     Fall Risk Assessment, last 12 mths 2021   Able to walk? Yes   Fall in past 12 months? 0   Do you feel unsteady? 0   Are you worried about falling 0   Is the gait abnormal? -   Number of falls in past 12 months -   Fall with injury? -     Abuse Screening:     Abuse Screening Questionnaire 2021   Do you ever feel afraid of your partner? N   Are you in a relationship with someone who physically or mentally threatens you? N   Is it safe for you to go home?  Y     ADL Assessment:     ADL Assessment 2021   Feeding yourself No Help Needed   Getting from bed to chair No Help Needed   Getting dressed No Help Needed   Bathing or showering No Help Needed   Walk across the room (includes cane/walker) No Help Needed   Using the telphone No Help Needed   Taking your medications No Help Needed   Preparing meals No Help Needed   Managing money (expenses/bills) No Help Needed   Moderately strenuous housework (laundry) No Help Needed   Shopping for personal items (toiletries/medicines) No Help Needed   Shopping for groceries No Help Needed   Driving No Help Needed   Climbing a flight of stairs No Help Needed   Getting to places beyond walking distances No Help Needed        Coordination of Care Questionaire:   1. \"Have you been to the ER, urgent care clinic since your last visit? Hospitalized since your last visit? \" No    2. \"Have you seen or consulted any other health care providers outside of the [address] [place] [address] [place] since your last visit? \" yes urology   3. For patients aged 39-70: Has the patient had a colonoscopy? Yes - no Care Gap present         Advanced Directive:   1. Do you have an Advanced Directive? NO    2. Would you like information on Advanced Directives?  NO

## 2022-03-19 PROBLEM — Z86.010 HISTORY OF COLONOSCOPY WITH POLYPECTOMY: Status: ACTIVE | Noted: 2019-08-27

## 2022-03-19 PROBLEM — E11.42 DIABETIC POLYNEUROPATHY ASSOCIATED WITH TYPE 2 DIABETES MELLITUS (HCC): Status: ACTIVE | Noted: 2017-01-16

## 2022-03-19 PROBLEM — K59.00 CONSTIPATION: Status: ACTIVE | Noted: 2019-03-18

## 2022-03-19 PROBLEM — Z86.0100 HISTORY OF COLONOSCOPY WITH POLYPECTOMY: Status: ACTIVE | Noted: 2019-08-27

## 2022-03-19 PROBLEM — Z98.890 HISTORY OF COLONOSCOPY WITH POLYPECTOMY: Status: ACTIVE | Noted: 2019-08-27

## 2022-03-19 PROBLEM — I25.10 CORONARY ARTERY DISEASE INVOLVING NATIVE CORONARY ARTERY OF NATIVE HEART WITHOUT ANGINA PECTORIS: Status: ACTIVE | Noted: 2018-03-05

## 2022-03-19 PROBLEM — R00.1 SYMPTOMATIC BRADYCARDIA: Status: ACTIVE | Noted: 2017-12-15

## 2022-05-03 NOTE — PROGRESS NOTES
HISTORY OF PRESENT ILLNESS  Damian Valdez is a 66 y.o. male. He reports persistent right-sided lumbar/buttock area pain previously radiating to the right thigh and inguinal area now with increased radiation down the right leg. He previously self referred for orthopedic surgery evaluation and x-rays of the hip showed only mild degenerative changes. Chart review indicates that lumbar spine x-ray views from 2016 showed a mild T11 compression fracture and severe disc space narrowing at L5-S1. Today he reports that the back pain is becoming progressively more severe and has reached a point where he is unable to sleep. He has been recently diagnosed with carcinoma the prostate and is currently waiting to begin radiation treatment. Mr#: [id number]      Past Medical History:   Diagnosis Date    Abrasion of skin 1/25/2016    Acid reflux     ACP (advance care planning)     has not yet filled out ACP, discussed 4/1/16    ACP (advance care planning)     has not yet filled out ACP, discussed 4/1/16     Aneurysm ([address]     Aortic root dilation ([address]     Borderline on echo June 8 2015 (done through [facility])    Arthritis     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 No femoral head collapse identified.     Back pain, thoracic     Bilateral arm pain 1/25/2016    Bilateral low back pain without sciatica 1/25/2016    BMI 33.0-33.9,adult     Bone lesion left femur     Asymptomatic, MRI planned for October    Brachial neuritis 3/24/2014    Carpal tunnel syndrome, bilateral     Cervical spondylosis     C4-C6    Chest pain 12/15/2017    Chronic pain     Compression fracture of thoracic vertebra (HCC) 1/2016    Coronary artery disease involving native coronary artery of native heart without angina pectoris     DDD (degenerative disc disease), lumbosacral     DDD (degenerative disc disease), lumbosacral     Decreased libido 3/19/2014    3/19/2014:  Lack of interest recently -- Check testosterone levels, doubt this has anything to do with hx of aortic aneurysm surgery by Dr. David Hill disorder     Diabetes ([facility]     Elevated transaminase level     AST and ALT     Essential hypertension     Fall through floor 1/25/2016    GERD (gastroesophageal reflux disease)     Herniated nucleus pulposus     Lumbar diskectomy 1980, C4/5 HNP, has gotten ESIs in the past    History of poliomyelitis     History of shingles 7/2013    Right flank/back, after having vaccine    Hypercholesterolemia     Hypertension     Hypoglycemia 5/3/2017    Hypogonadism male     Impingement syndrome of left shoulder 8/2013    Internal hemorrhoids     Long term (current) use of anticoagulants     baby asa    Low serum testosterone level 3/24/2014    3/21/14:  Serum testosterone 179 (L), free testosterone 5.1 (L) -- Referring to urology to discuss prostate screening and options for repletion     Normal cardiac stress test 9/18/2012 9/18/2012 -- Normal Lexiscan cardiac stress and rest perfusion study, EF 62% (read by Dr. Saria Samuel, [facility]), Raymundo Berg 9/21/2012 -- Holter monitor -- Pascual Brown 44%, tachy 0%, no arrhythmias detected, some premature atrial and ventricular beats 6/17/2010 -- Normal Lexiscan cardiac stress and rest perfusion study, EF 55% (read by Dr. Fe Ospina) MONICA Stringer     NELSON (obstructive sleep apnea)     Unable to tolerate CPAP    Osteoarthritis of right knee 11/13/2013    Xrays ([facility]) R knee - 11/12/13 -- Mild medial compartment DJD -- s/p steroid injection Nov 2013 [Dr. Frias Mater which resolved knee pain after about a week     Pulmonary nodule, left 11/21/2013    Single 0.5 cm posterior lateral aspect of left lower lobe pleural-based pulmonary nodule; Incidental finding, stable from 11/2008 CT to 6/1/2010 CT     Restless leg syndrome     Routine adult health maintenance 11/21/2013    -- Colon cancer:   Colonoscopy done 11/29/2005 at [facility] -- Vaccinations:   Herpes Zoster vaccine given 2012 at [facility]    S/P AAA repair     Dr. Charisma Bernardo following, q3 year duplex of aorta    Seasonal allergic reaction     Sleep apnea     Spinal stenosis in cervical region 3/24/2014    Type 2 diabetes mellitus, controlled ([address]        Past Surgical History:   Procedure Laterality Date    HX LUMBAR DISKECTOMY N/A 1980    HX ORTHOPAEDIC      left shoulder    HX SHOULDER ARTHROSCOPY  01/19/2015    Arthroscopy shoulder (Left) Procedure: ARTHROSCOPY SHOULDER ROTATOR CUFF REPAIR;  Surgeon: Stevenson Sauer MD;  Location: [address] [place] [address] OR [facility]; Service: Orthopedics; Laterality: Left;  Left Shoulder Arthroscopy, Subacromial Decompression, bicep tenotomy, debridment, [place]     VASCULAR SURGERY PROCEDURE UNLIST N/A     AAA repair with stent, US's at [facility]       Family History   Problem Relation Age of Onset   [facility] Cancer Mother         Rectal, metastasized late 76s    Heart Disease Father     Cancer Brother         Metastatic, not sure of primary    Cancer Maternal Uncle     Cancer Maternal Aunt        Allergies   Allergen Reactions    Zocor [Simvastatin] Myalgia       Social History     Tobacco Use   Smoking Status Former Smoker    Types: Cigarettes, Pipe   Smokeless Tobacco Former User   Tobacco Comment    Quit 43 years ago (2015), pipe 10+ years ago       Social History     Substance and Sexual Activity   Alcohol Use Yes    Alcohol/week: 0.0 standard drinks    Comment: Occasional beer, liquor or wine.   No heavy use           Patient Active Problem List   Diagnosis Code    Type 2 diabetes mellitus, without long-term current use of insulin ([facility]) E11.9    Hypercholesterolemia E78.00    Arthritis M19.90    Acid reflux K21.9    Restless leg syndrome G25.81    Multilevel degenerative disc disease M53.9    Hypogonadism male E29.1    NELSON (obstructive sleep apnea) G47.33    Essential hypertension I10    BMI 33.0-33.9,adult Z68.33    S/P AAA repair Z98.890, Z86.79    Decreased GFR R94.4    Chronic seasonal allergic rhinitis due to pollen J30.1    Tinnitus of both ears H93.13    Thoracic compression fracture (HCC) S22.000A    Bone lesion left femur M89.9    Diabetic polyneuropathy associated with type 2 diabetes mellitus (HCC) E11.42    Symptomatic bradycardia R00.1    Coronary artery disease involving native coronary artery of native heart without angina pectoris I25.10    Constipation K59.00    History of colonoscopy with polypectomy Z98.890, Z86.010         Current Outpatient Medications:     glucose blood VI test strips (FreeStyle Lite Strips) strip, ICD10 E11.65 - DM2 daily BG testing., Disp: 100 Strip, Rfl: 3    telmisartan (Micardis) 40 mg tablet, Take 1 Tablet by mouth daily. , Disp: 90 Tablet, Rfl: 4    atorvastatin (LIPITOR) 80 mg tablet, TAKE 1 TABLET BY MOUTH ONCE DAILY, Disp: 90 Tablet, Rfl: 4    TURMERIC PO, Take 500 mg by mouth two (2) times a day., Disp: , Rfl:     metFORMIN (GLUCOPHAGE) 1,000 mg tablet, TAKE 1 TABLET BY MOUTH TWICE DAILY WITH MEALS, Disp: 180 Tablet, Rfl: 3    linaGLIPtin (TRADJENTA) 5 mg tablet, Take 1 Tablet by mouth daily. , Disp: 90 Tablet, Rfl: 3    aspirin delayed-release 81 mg tablet, Take  by mouth daily. , Disp: , Rfl:     Blood-Glucose Meter misc, Uncontrolled DM2 E11.65. BID testing. Disp 1 meter, Disp: 1 Each, Rfl: 0    Lancets misc, Uncontrolled DM2. BID testing. Disp 2 boxes of 100 each with 3 refills for 90 day supply. , Disp: 2 Package, Rfl: 3    Diabetic Supplies, [address] (LANCING DEVICE WITH LANCETS) misc, DM2 uncontrolled - BG testing BID., Disp: 1 Each, Rfl: 0    olopatadine (PATANOL) 0.1 % ophthalmic solution, Administer 2 Drops to both eyes two (2) times daily as needed for Allergies.  (Patient taking differently: Administer 2 Drops to both eyes two (2) times a day.), Disp: 15 mL, Rfl: 3    omega-3 fatty acids-vitamin e (FISH OIL) 1,000 mg cap, Take 1 Cap by mouth two (2) times a day., Disp: 180 Cap, Rfl: 3         Review of Systems   Constitutional: Negative for fever. Respiratory: Negative for shortness of breath. Cardiovascular: Negative for chest pain and palpitations. Musculoskeletal: Positive for back pain ( See HPI). Neurological: Negative for tingling and focal weakness. Visit Vitals  BP (!) 142/84   Pulse (!) 52   Temp 98 °F (36.7 °C) (Temporal)   Resp 16   Ht 6' (1.829 m)   Wt 247 lb (112 kg)   SpO2 98%   BMI 33.50 kg/m²       Physical Exam  Vitals and nursing note reviewed. Constitutional:       General: He is not in acute distress. Appearance: Normal appearance. He is well-developed. He is not ill-appearing. HENT:      Head: Normocephalic. Eyes:      Extraocular Movements: Extraocular movements intact. Cardiovascular:      Rate and Rhythm: Normal rate. Comments: Persistent elevation of blood pressure above appropriate range for diabetic patient  Pulmonary:      Effort: Pulmonary effort is normal.   Musculoskeletal:      Comments: Back exam reveals no tenderness to palpation, negative straight leg raise and AGNES bilaterally but marked episodic pain with change in position. LE muscle strength grossly intact and symmetrical     Skin:     General: Skin is warm and dry. Neurological:      Mental Status: He is alert and oriented to person, place, and time. Psychiatric:         Mood and Affect: Mood normal.         Behavior: Behavior normal.         Thought Content: Thought content normal.         ASSESSMENT and PLAN    ICD-10-CM ICD-9-CM    1. Lumbar back pain with radiculopathy affecting right lower extremity  M54.16 724.4 MRI LUMB SPINE WO CONT      traMADoL (ULTRAM) 50 mg tablet   2. Essential hypertension  I10 401.9 telmisartan (MICARDIS) 80 mg tablet   3.  Prostate cancer ([address] C61 185    Assessment:  Symptoms suggestive of progressively worsening right lumbar radiculopathy  Hypertension in suboptimal control    Plan:  Radiation oncology treatment to begin soon  Lumbar spine MRI scan with further disposition pending results (requires open scanner because of severe claustrophobia)  Tramadol 50 mg up to 4 times daily, as needed for pain, preferably just at bedtime, not to be taken with other sedating medications  Increase telmisartan (Micardis) to 80 mg daily, use up leftover 40 mg tablets by taking 2 at once  Return for lab and Medicare wellness appointments as scheduled in August or sooner with any problems. Addendum 5/13/2022  MR scan from 5/12/2022 shows multilevel degenerative disc disease, mild multilevel canal stenosis and up to moderate multilevel neuroforaminal stenosis  Will refer for interventional pain management evaluation to see if any intervention options are available    Che Mckenzie MD      PLEASE NOTE:   This document has been produced using voice recognition software. Unrecognized errors in transcription may be present.

## 2022-05-04 ENCOUNTER — OFFICE VISIT (OUTPATIENT)
Dept: FAMILY MEDICINE CLINIC | Age: 72
End: 2022-05-04
Payer: MEDICARE

## 2022-05-04 VITALS
TEMPERATURE: 98 F | OXYGEN SATURATION: 98 % | BODY MASS INDEX: 33.46 KG/M2 | HEIGHT: 72 IN | SYSTOLIC BLOOD PRESSURE: 142 MMHG | DIASTOLIC BLOOD PRESSURE: 84 MMHG | RESPIRATION RATE: 16 BRPM | WEIGHT: 247 LBS | HEART RATE: 52 BPM

## 2022-05-04 DIAGNOSIS — M54.16 LUMBAR BACK PAIN WITH RADICULOPATHY AFFECTING RIGHT LOWER EXTREMITY: Primary | ICD-10-CM

## 2022-05-04 DIAGNOSIS — I10 ESSENTIAL HYPERTENSION: ICD-10-CM

## 2022-05-04 DIAGNOSIS — C61 PROSTATE CANCER (HCC): ICD-10-CM

## 2022-05-04 PROCEDURE — 1101F PT FALLS ASSESS-DOCD LE1/YR: CPT | Performed by: FAMILY MEDICINE

## 2022-05-04 PROCEDURE — G8536 NO DOC ELDER MAL SCRN: HCPCS | Performed by: FAMILY MEDICINE

## 2022-05-04 PROCEDURE — G8753 SYS BP > OR = 140: HCPCS | Performed by: FAMILY MEDICINE

## 2022-05-04 PROCEDURE — G8510 SCR DEP NEG, NO PLAN REQD: HCPCS | Performed by: FAMILY MEDICINE

## 2022-05-04 PROCEDURE — 99214 OFFICE O/P EST MOD 30 MIN: CPT | Performed by: FAMILY MEDICINE

## 2022-05-04 PROCEDURE — G8427 DOCREV CUR MEDS BY ELIG CLIN: HCPCS | Performed by: FAMILY MEDICINE

## 2022-05-04 PROCEDURE — G8754 DIAS BP LESS 90: HCPCS | Performed by: FAMILY MEDICINE

## 2022-05-04 PROCEDURE — G8417 CALC BMI ABV UP PARAM F/U: HCPCS | Performed by: FAMILY MEDICINE

## 2022-05-04 PROCEDURE — 3017F COLORECTAL CA SCREEN DOC REV: CPT | Performed by: FAMILY MEDICINE

## 2022-05-04 RX ORDER — TELMISARTAN 80 MG/1
80 TABLET ORAL DAILY
Qty: 90 TABLET | Refills: 3 | Status: SHIPPED | OUTPATIENT
Start: 2022-05-04 | End: 2022-09-30 | Stop reason: SDUPTHER

## 2022-05-04 RX ORDER — TRAMADOL HYDROCHLORIDE 50 MG/1
50 TABLET ORAL
Qty: 28 TABLET | Refills: 0 | Status: SHIPPED | OUTPATIENT
Start: 2022-05-04 | End: 2022-05-11

## 2022-05-04 NOTE — PROGRESS NOTES
Jennifer Hernandez is a 66 y.o. male (: 1950) presenting to address:    Chief Complaint   Patient presents with    Leg Pain     right buttock into leg        Vitals:    22 0917 22 0924   BP: (!) 160/80 (!) 142/84   Pulse: (!) 52    Resp: 16    Temp: 98 °F (36.7 °C)    TempSrc: Temporal    SpO2: 98%    Weight: 247 lb (112 kg)    Height: 6' (1.829 m)    PainSc:   5    PainLoc: Buttocks        Hearing/Vision:   No exam data present    Learning Assessment:     Learning Assessment 2020   PRIMARY LEARNER Patient   HIGHEST LEVEL OF EDUCATION - PRIMARY LEARNER  SOME COLLEGE   BARRIERS PRIMARY LEARNER NONE   CO-LEARNER CAREGIVER No   PRIMARY LANGUAGE ENGLISH    NEED No   LEARNER PREFERENCE PRIMARY DEMONSTRATION   ANSWERED BY patient   RELATIONSHIP SELF     Depression Screening:     3 most recent PHQ Screens 2022   PHQ Not Done -   Little interest or pleasure in doing things Not at all   Feeling down, depressed, irritable, or hopeless Not at all   Total Score PHQ 2 0     Fall Risk Assessment:     Fall Risk Assessment, last 12 mths 2021   Able to walk? Yes   Fall in past 12 months? 0   Do you feel unsteady? 0   Are you worried about falling 0   Is the gait abnormal? -   Number of falls in past 12 months -   Fall with injury? -     Abuse Screening:     Abuse Screening Questionnaire 2021   Do you ever feel afraid of your partner? N   Are you in a relationship with someone who physically or mentally threatens you? N   Is it safe for you to go home?  Y     ADL Assessment:     ADL Assessment 2021   Feeding yourself No Help Needed   Getting from bed to chair No Help Needed   Getting dressed No Help Needed   Bathing or showering No Help Needed   Walk across the room (includes cane/walker) No Help Needed   Using the telphone No Help Needed   Taking your medications No Help Needed   Preparing meals No Help Needed   Managing money (expenses/bills) No Help Needed   Moderately strenuous housework (laundry) No Help Needed   Shopping for personal items (toiletries/medicines) No Help Needed   Shopping for groceries No Help Needed   Driving No Help Needed   Climbing a flight of stairs No Help Needed   Getting to places beyond walking distances No Help Needed        Coordination of Care Questionaire:   1. \"Have you been to the ER, urgent care clinic since your last visit? Hospitalized since your last visit? \" No    2. \"Have you seen or consulted any other health care providers outside of the [address] [place] [address] [place] since your last visit? yes urology     3. For patients aged 39-70: Has the patient had a colonoscopy? Yes - no Care Gap present       Advanced Directive:   1. Do you have an Advanced Directive? NO    2. Would you like information on Advanced Directives?  NO

## 2022-05-04 NOTE — PATIENT INSTRUCTIONS
Assessment:  Symptoms suggestive of progressively worsening right lumbar radiculopathy  Hypertension in suboptimal control    Plan:  Lumbar spine MRI scan with further disposition pending results  Tramadol 50 mg up to 4 times daily, as needed for pain, preferably just at bedtime, not to be taken with other sedating medications  Increase telmisartan (Micardis) to 80 mg daily, use up leftover 40 mg tablets by taking 2 at once  Return for lab and Medicare wellness appointments as scheduled in August or sooner with any problems.

## 2022-05-05 ENCOUNTER — TELEPHONE (OUTPATIENT)
Dept: FAMILY MEDICINE CLINIC | Age: 72
End: 2022-05-05

## 2022-05-05 NOTE — TELEPHONE ENCOUNTER
Anne Wolff called from [facility] in regards to the MRI order that they received. In the notes it states he needs an open MRI and they di not do that there. She wants to know if they should proceed with scheduling the pt or if they should cancel the order. Please advise.

## 2022-05-06 NOTE — TELEPHONE ENCOUNTER
I called and confirmed with [facility] on [address] they do have a open Mri order has been faxed and patient is aware .

## 2022-05-06 NOTE — TELEPHONE ENCOUNTER
I called patient to notify him of below . He would like a facility that has a open MRI . I told him that according to [facility] website which is located on [address] in [place] they have a open Mri . Patient would like a call to confirm with the facility .

## 2022-05-09 ENCOUNTER — TELEPHONE (OUTPATIENT)
Dept: FAMILY MEDICINE CLINIC | Age: 72
End: 2022-05-09

## 2022-05-09 DIAGNOSIS — F40.240 CLAUSTROPHOBIA: Primary | ICD-10-CM

## 2022-05-09 RX ORDER — ALPRAZOLAM 0.5 MG/1
TABLET ORAL
Qty: 5 TABLET | Refills: 0 | Status: SHIPPED | OUTPATIENT
Start: 2022-05-09 | End: 2022-11-03 | Stop reason: ALTCHOICE

## 2022-05-09 NOTE — TELEPHONE ENCOUNTER
Pt called to request the medication to help with his claustrophobia. He originally did not want it however he is wanting to have it just in case. His MRI is scheduled for tomorrow at 9am. Please advise.

## 2022-05-09 NOTE — TELEPHONE ENCOUNTER
A prescription for alprazolam 0.5 mg to be taken approximately 45 minutes prior to the radiology procedure has been sent to the patient's pharmacy

## 2022-05-13 ENCOUNTER — TELEPHONE (OUTPATIENT)
Dept: FAMILY MEDICINE CLINIC | Age: 72
End: 2022-05-13

## 2022-05-13 NOTE — TELEPHONE ENCOUNTER
Please advise Mr. Suzanne Quick that his results which I just received this morning seem to show that most of his pain may be coming from arthritic crowding of nerve vessels where they exit the spinal column. This does not look like a problem that needs a surgical intervention but he might benefit from seeing an interventional pain management specialist who can do epidural steroid injections and facet blocks. Please let me know if he would like to be referred to.

## 2022-05-13 NOTE — TELEPHONE ENCOUNTER
Patient requesting a call back from Doctor or nurse. He is looking for information about an MRI that was done recently. Please call back when convenient.

## 2022-07-11 DIAGNOSIS — M54.16 LUMBAR BACK PAIN WITH RADICULOPATHY AFFECTING RIGHT LOWER EXTREMITY: ICD-10-CM

## 2022-08-08 ENCOUNTER — APPOINTMENT (OUTPATIENT)
Dept: FAMILY MEDICINE CLINIC | Age: 72
End: 2022-08-08

## 2022-08-08 ENCOUNTER — HOSPITAL ENCOUNTER (OUTPATIENT)
Dept: LAB | Age: 72
Discharge: HOME | End: 2022-08-08
Payer: MEDICARE

## 2022-08-08 DIAGNOSIS — E78.00 HYPERCHOLESTEROLEMIA: ICD-10-CM

## 2022-08-08 DIAGNOSIS — E11.42 TYPE 2 DIABETES MELLITUS WITH DIABETIC POLYNEUROPATHY, WITHOUT LONG-TERM CURRENT USE OF INSULIN (HCC): ICD-10-CM

## 2022-08-08 DIAGNOSIS — I10 ESSENTIAL HYPERTENSION: ICD-10-CM

## 2022-08-08 LAB
ALBUMIN SERPL-MCNC: 4 G/DL (ref 3.4–5)
ALBUMIN/GLOB SERPL: 1.3 {RATIO} (ref 0.8–1.7)
ALP SERPL-CCNC: 63 U/L (ref 45–117)
ALT SERPL-CCNC: 18 U/L (ref 16–61)
ANION GAP SERPL CALC-SCNC: 8 MMOL/L (ref 3–18)
APPEARANCE UR: CLEAR
AST SERPL-CCNC: 14 U/L (ref 10–38)
BASOPHILS # BLD: 0.1 K/UL (ref 0–0.1)
BASOPHILS NFR BLD: 1 % (ref 0–2)
BILIRUB SERPL-MCNC: 0.5 MG/DL (ref 0.2–1)
BILIRUB UR QL: NEGATIVE
BUN SERPL-MCNC: 17 MG/DL (ref 7–18)
BUN/CREAT SERPL: 17 (ref 12–20)
CALCIUM SERPL-MCNC: 9 MG/DL (ref 8.5–10.1)
CHLORIDE SERPL-SCNC: 107 MMOL/L (ref 100–111)
CHOLEST SERPL-MCNC: 123 MG/DL
CO2 SERPL-SCNC: 25 MMOL/L (ref 21–32)
COLOR UR: YELLOW
CREAT SERPL-MCNC: 1.01 MG/DL (ref 0.6–1.3)
CREAT UR-MCNC: 130 MG/DL (ref 30–125)
DIFFERENTIAL METHOD BLD: ABNORMAL
EOSINOPHIL # BLD: 0.2 K/UL (ref 0–0.4)
EOSINOPHIL NFR BLD: 2 % (ref 0–5)
ERYTHROCYTE [DISTWIDTH] IN BLOOD BY AUTOMATED COUNT: 13.2 % (ref 11.6–14.5)
EST. AVERAGE GLUCOSE BLD GHB EST-MCNC: 166 MG/DL
GLOBULIN SER CALC-MCNC: 3 G/DL (ref 2–4)
GLUCOSE SERPL-MCNC: 145 MG/DL (ref 74–99)
GLUCOSE UR STRIP.AUTO-MCNC: 500 MG/DL
HBA1C MFR BLD: 7.4 % (ref 4.2–5.6)
HCT VFR BLD AUTO: 38.5 % (ref 36–48)
HDLC SERPL-MCNC: 38 MG/DL (ref 40–60)
HDLC SERPL: 3.2 {RATIO} (ref 0–5)
HGB BLD-MCNC: 12.6 G/DL (ref 13–16)
HGB UR QL STRIP: NEGATIVE
IMM GRANULOCYTES # BLD AUTO: 0.1 K/UL (ref 0–0.04)
IMM GRANULOCYTES NFR BLD AUTO: 1 % (ref 0–0.5)
KETONES UR QL STRIP.AUTO: NEGATIVE MG/DL
LDLC SERPL CALC-MCNC: 56.2 MG/DL (ref 0–100)
LEUKOCYTE ESTERASE UR QL STRIP.AUTO: NEGATIVE
LIPID PROFILE,FLP: ABNORMAL
LYMPHOCYTES # BLD: 2.3 K/UL (ref 0.9–3.6)
LYMPHOCYTES NFR BLD: 24 % (ref 21–52)
MCH RBC QN AUTO: 29.9 PG (ref 24–34)
MCHC RBC AUTO-ENTMCNC: 32.7 G/DL (ref 31–37)
MCV RBC AUTO: 91.4 FL (ref 78–100)
MICROALBUMIN UR-MCNC: 1.34 MG/DL (ref 0–3)
MICROALBUMIN/CREAT UR-RTO: 10 MG/G (ref 0–30)
MONOCYTES # BLD: 0.8 K/UL (ref 0.05–1.2)
MONOCYTES NFR BLD: 8 % (ref 3–10)
NEUTS SEG # BLD: 6 K/UL (ref 1.8–8)
NEUTS SEG NFR BLD: 64 % (ref 40–73)
NITRITE UR QL STRIP.AUTO: NEGATIVE
NRBC # BLD: 0 K/UL (ref 0–0.01)
NRBC BLD-RTO: 0 PER 100 WBC
PH UR STRIP: 5 [PH] (ref 5–8)
PLATELET # BLD AUTO: 236 K/UL (ref 135–420)
PMV BLD AUTO: 9.8 FL (ref 9.2–11.8)
POTASSIUM SERPL-SCNC: 4.1 MMOL/L (ref 3.5–5.5)
PROT SERPL-MCNC: 7 G/DL (ref 6.4–8.2)
PROT UR STRIP-MCNC: NEGATIVE MG/DL
RBC # BLD AUTO: 4.21 M/UL (ref 4.35–5.65)
SODIUM SERPL-SCNC: 140 MMOL/L (ref 136–145)
SP GR UR REFRACTOMETRY: 1.02 (ref 1–1.03)
TRIGL SERPL-MCNC: 144 MG/DL (ref ?–150)
TSH SERPL DL<=0.05 MIU/L-ACNC: 3.59 UIU/ML (ref 0.36–3.74)
UROBILINOGEN UR QL STRIP.AUTO: 0.2 EU/DL (ref 0.2–1)
VLDLC SERPL CALC-MCNC: 28.8 MG/DL
WBC # BLD AUTO: 9.4 K/UL (ref 4.6–13.2)

## 2022-08-08 PROCEDURE — 36415 COLL VENOUS BLD VENIPUNCTURE: CPT

## 2022-08-08 PROCEDURE — 80061 LIPID PANEL: CPT

## 2022-08-08 PROCEDURE — 80053 COMPREHEN METABOLIC PANEL: CPT

## 2022-08-08 PROCEDURE — 85025 COMPLETE CBC W/AUTO DIFF WBC: CPT

## 2022-08-08 PROCEDURE — 82043 UR ALBUMIN QUANTITATIVE: CPT

## 2022-08-08 PROCEDURE — 83036 HEMOGLOBIN GLYCOSYLATED A1C: CPT

## 2022-08-08 PROCEDURE — 81003 URINALYSIS AUTO W/O SCOPE: CPT

## 2022-08-08 PROCEDURE — 84443 ASSAY THYROID STIM HORMONE: CPT

## 2022-08-08 NOTE — TELEPHONE ENCOUNTER
Patient came in to office requesting rx for generic tradjenta be sent to [facility] . Last Ov 5/4/22 . Last refilled 9/8/21 for 90 with 3 refills .    Future Appointments   Date Time Provider Nora Bullard   8/11/2022  8:00 AM Kristy Rick MD [facility]

## 2022-08-11 ENCOUNTER — OFFICE VISIT (OUTPATIENT)
Dept: FAMILY MEDICINE CLINIC | Age: 72
End: 2022-08-11
Payer: MEDICARE

## 2022-08-11 VITALS
TEMPERATURE: 97.6 F | RESPIRATION RATE: 16 BRPM | WEIGHT: 253 LBS | HEART RATE: 45 BPM | HEIGHT: 72 IN | DIASTOLIC BLOOD PRESSURE: 76 MMHG | SYSTOLIC BLOOD PRESSURE: 130 MMHG | OXYGEN SATURATION: 98 % | BODY MASS INDEX: 34.27 KG/M2

## 2022-08-11 DIAGNOSIS — C61 PROSTATE CANCER (HCC): ICD-10-CM

## 2022-08-11 DIAGNOSIS — Z00.00 MEDICARE ANNUAL WELLNESS VISIT, SUBSEQUENT: Primary | ICD-10-CM

## 2022-08-11 DIAGNOSIS — E11.42 TYPE 2 DIABETES MELLITUS WITH DIABETIC POLYNEUROPATHY, WITHOUT LONG-TERM CURRENT USE OF INSULIN (HCC): ICD-10-CM

## 2022-08-11 DIAGNOSIS — I25.10 CORONARY ARTERY DISEASE INVOLVING NATIVE CORONARY ARTERY OF NATIVE HEART WITHOUT ANGINA PECTORIS: ICD-10-CM

## 2022-08-11 DIAGNOSIS — E78.00 HYPERCHOLESTEROLEMIA: ICD-10-CM

## 2022-08-11 DIAGNOSIS — I10 ESSENTIAL HYPERTENSION: ICD-10-CM

## 2022-08-11 DIAGNOSIS — M53.9 MULTILEVEL DEGENERATIVE DISC DISEASE: ICD-10-CM

## 2022-08-11 PROCEDURE — 3017F COLORECTAL CA SCREEN DOC REV: CPT | Performed by: FAMILY MEDICINE

## 2022-08-11 PROCEDURE — G0439 PPPS, SUBSEQ VISIT: HCPCS | Performed by: FAMILY MEDICINE

## 2022-08-11 PROCEDURE — G8510 SCR DEP NEG, NO PLAN REQD: HCPCS | Performed by: FAMILY MEDICINE

## 2022-08-11 PROCEDURE — G8754 DIAS BP LESS 90: HCPCS | Performed by: FAMILY MEDICINE

## 2022-08-11 PROCEDURE — G8536 NO DOC ELDER MAL SCRN: HCPCS | Performed by: FAMILY MEDICINE

## 2022-08-11 PROCEDURE — 2022F DILAT RTA XM EVC RTNOPTHY: CPT | Performed by: FAMILY MEDICINE

## 2022-08-11 PROCEDURE — 1101F PT FALLS ASSESS-DOCD LE1/YR: CPT | Performed by: FAMILY MEDICINE

## 2022-08-11 PROCEDURE — G8752 SYS BP LESS 140: HCPCS | Performed by: FAMILY MEDICINE

## 2022-08-11 PROCEDURE — G8417 CALC BMI ABV UP PARAM F/U: HCPCS | Performed by: FAMILY MEDICINE

## 2022-08-11 PROCEDURE — G8427 DOCREV CUR MEDS BY ELIG CLIN: HCPCS | Performed by: FAMILY MEDICINE

## 2022-08-11 PROCEDURE — 3051F HG A1C>EQUAL 7.0%<8.0%: CPT | Performed by: FAMILY MEDICINE

## 2022-08-11 PROCEDURE — 99214 OFFICE O/P EST MOD 30 MIN: CPT | Performed by: FAMILY MEDICINE

## 2022-08-11 PROCEDURE — 1123F ACP DISCUSS/DSCN MKR DOCD: CPT | Performed by: FAMILY MEDICINE

## 2022-08-11 NOTE — PATIENT INSTRUCTIONS
5-year personalized preventative services plan:  Complete and return advance directives form  Influenza immunization recommended later this fall  Diabetic eye exam due January 2023  Colorectal cancer screening (colonoscopy)-family history of mother with rectal cancer, overdue for colonoscopy but will defer this until after prostate radiation treatment is completed  Medicare wellness evaluation due August 2023    Assessment:  Type 2 diabetes less well controlled  Hypertension in satisfactory control  Satisfactory lipid levels  No symptoms of cardiac ischemia  Right lumbar radiculopathy symptoms markedly improved subsequent to epidural steroid injection  Prostate cancer currently under treatment and followed by urology    Plan:  Options for management of diabetes discussed including dietary modification versus addition of additional medication, will address with lifestyle modification  Return for follow-up with in office hemoglobin A1c in 3 months  Continue current medications  Continue with urology follow-up as recommended by the consultant

## 2022-08-11 NOTE — PROGRESS NOTES
Caridad Pedraza is a 66 y.o. male (: 1950) presenting to address:    Chief Complaint   Patient presents with    Annual Wellness Visit       Vitals:    22 0759   BP: 130/76   Pulse: (!) 45   Resp: 16   Temp: 97.6 °F (36.4 °C)   TempSrc: Temporal   SpO2: 98%   Weight: 253 lb (114.8 kg)   Height: 6' (1.829 m)   PainSc:   0 - No pain       Hearing/Vision:     Vision Screening    Right eye Left eye Both eyes   Without correction      With correction 20/30 20/30 20/25       Learning Assessment:     Learning Assessment 2020   PRIMARY LEARNER Patient   HIGHEST LEVEL OF EDUCATION - PRIMARY LEARNER  SOME COLLEGE   BARRIERS PRIMARY LEARNER NONE   CO-LEARNER CAREGIVER No   PRIMARY LANGUAGE ENGLISH    NEED No   LEARNER PREFERENCE PRIMARY DEMONSTRATION   ANSWERED BY patient   RELATIONSHIP SELF     Depression Screening:     3 most recent PHQ Screens 2022   PHQ Not Done -   Little interest or pleasure in doing things Not at all   Feeling down, depressed, irritable, or hopeless Not at all   Total Score PHQ 2 0     Fall Risk Assessment:     Fall Risk Assessment, last 12 mths 2022   Able to walk? Yes   Fall in past 12 months? 0   Do you feel unsteady? 0   Are you worried about falling 0   Is the gait abnormal? -   Number of falls in past 12 months -   Fall with injury? -     Abuse Screening:     Abuse Screening Questionnaire 2021   Do you ever feel afraid of your partner? N   Are you in a relationship with someone who physically or mentally threatens you? N   Is it safe for you to go home?  Y     ADL Assessment:     ADL Assessment 2022   Feeding yourself No Help Needed   Getting from bed to chair No Help Needed   Getting dressed No Help Needed   Bathing or showering No Help Needed   Walk across the room (includes cane/walker) No Help Needed   Using the telphone No Help Needed   Taking your medications No Help Needed   Preparing meals No Help Needed   Managing money (expenses/bills) No Help Needed   Moderately strenuous housework (laundry) No Help Needed   Shopping for personal items (toiletries/medicines) No Help Needed   Shopping for groceries No Help Needed   Driving No Help Needed   Climbing a flight of stairs No Help Needed   Getting to places beyond walking distances No Help Needed        Coordination of Care Questionaire:   1. \"Have you been to the ER, urgent care clinic since your last visit? Hospitalized since your last visit? \" No    2. \"Have you seen or consulted any other health care providers outside of the [facility] [place] [address] [place] since your last visit? \" Yes Urology , ortho see [facility]    3. For patients aged 39-70: Has the patient had a colonoscopy? Yes - no Care Gap present     I  Advanced Directive:   1. Do you have an Advanced Directive? NO    2. Would you like information on Advanced Directives?  NO

## 2022-08-11 NOTE — PROGRESS NOTES
HISTORY OF PRESENT ILLNESS  Dorian Scruggs is a 66 y.o. male. He presents for follow-up with a history of type 2 diabetes with diabetic neuropathy, coronary artery disease, hypertension, hyperlipidemia, previous abdominal aortic aneurysm repair, diagnosis with prostate cancer approximately 6 months ago followed by urology and previously referred for radiation oncology evaluation, lumbar degenerative disc disease recently status post epidural steroid injection with marked improvement in his right lumbar radiculopathy symptoms as well as for Medicare wellness evaluation    Mr#: [id number]      Past Medical History:   Diagnosis Date    Abrasion of skin 1/25/2016    Acid reflux     ACP (advance care planning)     has not yet filled out ACP, discussed 4/1/16    ACP (advance care planning)     has not yet filled out ACP, discussed 4/1/16     Aneurysm ([address]     Aortic root dilation ([address]     Borderline on echo June 8 2015 (done through [facility])    Arthritis     Avascular necrosis of bones of both hips ([address] 4/19/2016    MRI L-spine revealed bialteral AVN femoral heads (MRI/CT dx, report submitted for scannin)     Avascular necrosis of femoral head ([address] 2/2015    BILATERAL, noted on CT 2/24/15 ([facility]) - Avascular necrosis involving both femoral heads. No femoral head collapse identified.     Back pain, thoracic     Bilateral arm pain 1/25/2016    Bilateral low back pain without sciatica 1/25/2016    BMI 33.0-33.9,adult     Bone lesion left femur     Asymptomatic, MRI planned for October    Brachial neuritis 3/24/2014    Carpal tunnel syndrome, bilateral     Cervical spondylosis     C4-C6    Chest pain 12/15/2017    Chronic pain     Compression fracture of thoracic vertebra (HCC) 1/2016    Coronary artery disease involving native coronary artery of native heart without angina pectoris     DDD (degenerative disc disease), lumbosacral     DDD (degenerative disc disease), lumbosacral     Decreased libido 3/19/2014    3/19/2014: Lack of interest recently -- Check testosterone levels, doubt this has anything to do with hx of aortic aneurysm surgery by Dr. Lisseth Tillman disorder     Diabetes [facility])     Elevated transaminase level     AST and ALT     Essential hypertension     Fall through floor 1/25/2016    GERD (gastroesophageal reflux disease)     Herniated nucleus pulposus     Lumbar diskectomy 1980, C4/5 HNP, has gotten ESIs in the past    History of poliomyelitis     History of shingles 7/2013    Right flank/back, after having vaccine    Hypercholesterolemia     Hypertension     Hypoglycemia 5/3/2017    Hypogonadism male     Impingement syndrome of left shoulder 8/2013    Internal hemorrhoids     Long term (current) use of anticoagulants     baby asa    Low serum testosterone level 3/24/2014    3/21/14:  Serum testosterone 179 (L), free testosterone 5.1 (L) -- Referring to urology to discuss prostate screening and options for repletion     Normal cardiac stress test 9/18/2012 9/18/2012 -- Normal Lexiscan cardiac stress and rest perfusion study, EF 62% (read by Dr. Stephanie Godwin, [facility]), Klyie Oms 9/21/2012 -- Holter monitor -- Darl Hench 44%, tachy 0%, no arrhythmias detected, some premature atrial and ventricular beats 6/17/2010 -- Normal Lexiscan cardiac stress and rest perfusion study, EF 55% (read by Dr. Alycia House) MONICA [place]     NELSON (obstructive sleep apnea)     Unable to tolerate CPAP    Osteoarthritis of right knee 11/13/2013    Xrays ([facility]) R knee - 11/12/13 -- Mild medial compartment DJD -- s/p steroid injection Nov 2013 [Dr. Sai Rodriguez which resolved knee pain after about a week     Pulmonary nodule, left 11/21/2013    Single 0.5 cm posterior lateral aspect of left lower lobe pleural-based pulmonary nodule;  Incidental finding, stable from 11/2008 CT to 6/1/2010 CT     Restless leg syndrome     Routine adult health maintenance 11/21/2013    -- Colon cancer:   Colonoscopy done 11/29/2005 at [facility] -- Vaccinations:   Herpes Zoster vaccine given 2012 at [facility]    S/P AAA repair     Dr. Kaylie Huffman following, q3 year duplex of aorta    Seasonal allergic reaction     Sleep apnea     Spinal stenosis in cervical region 3/24/2014    Type 2 diabetes mellitus, controlled ([facility]        Past Surgical History:   Procedure Laterality Date    HX COLONOSCOPY  06/10/2015    Negative study, 5-year follow-up recommended because of family history, Dr. Rola Aguilar      left shoulder    HX SHOULDER ARTHROSCOPY  01/19/2015    Arthroscopy shoulder (Left) Procedure: ARTHROSCOPY SHOULDER ROTATOR CUFF REPAIR;  Surgeon: Yolie Burden MD;  Location: [facility] [place] OR LOC; Service: Orthopedics; Laterality: Left;  Left Shoulder Arthroscopy, Subacromial Decompression, bicep tenotomy, debridment, [place]     VASCULAR SURGERY PROCEDURE UNLIST N/A     AAA repair with stent, US's at [facility]       Family History   Problem Relation Age of Onset    Cancer Mother         Rectal, metastasized late 76s    Heart Disease Father     Cancer Brother         Metastatic, not sure of primary    Cancer Maternal Uncle     Cancer Maternal Aunt        Allergies   Allergen Reactions    Zocor [Simvastatin] Myalgia       Social History     Tobacco Use   Smoking Status Former    Types: Cigarettes, Pipe   Smokeless Tobacco Former   Tobacco Comments    Quit 43 years ago (2015), pipe 10+ years ago       Social History     Substance and Sexual Activity   Alcohol Use Yes    Alcohol/week: 0.0 standard drinks    Comment: Occasional beer, liquor or wine.   No heavy use           Patient Active Problem List   Diagnosis Code    Type 2 diabetes mellitus, without long-term current use of insulin ([facility] E11.9    Hypercholesterolemia E78.00    Arthritis M19.90    Acid reflux K21.9    Restless leg syndrome G25.81    Multilevel degenerative disc disease M53.9    Hypogonadism male E29.1    NELSON (obstructive sleep apnea) G47.33    Essential hypertension I10    BMI 33.0-33.9,adult W13.76    S/P AAA repair Z98.890, Z86.79    Decreased GFR R94.4    Chronic seasonal allergic rhinitis due to pollen J30.1    Tinnitus of both ears H93.13    Thoracic compression fracture (HCC) S22.000A    Bone lesion left femur M89.9    Diabetic polyneuropathy associated with type 2 diabetes mellitus (HCC) E11.42    Symptomatic bradycardia R00.1    Coronary artery disease involving native coronary artery of native heart without angina pectoris I25.10    Constipation K59.00    History of colonoscopy with polypectomy Z98.890, Z86.010         Current Outpatient Medications:     linaGLIPtin (TRADJENTA) 5 mg tablet, Take 1 Tablet by mouth in the morning., Disp: 90 Tablet, Rfl: 3    ALPRAZolam (XANAX) 0.5 mg tablet, Take 1 tablet 45 minutes prior to radiology procedure, Disp: 5 Tablet, Rfl: 0    telmisartan (MICARDIS) 80 mg tablet, Take 1 Tablet by mouth daily. , Disp: 90 Tablet, Rfl: 3    glucose blood VI test strips (FreeStyle Lite Strips) strip, ICD10 E11.65 - DM2 daily BG testing., Disp: 100 Strip, Rfl: 3    atorvastatin (LIPITOR) 80 mg tablet, TAKE 1 TABLET BY MOUTH ONCE DAILY, Disp: 90 Tablet, Rfl: 4    TURMERIC PO, Take 500 mg by mouth two (2) times a day., Disp: , Rfl:     metFORMIN (GLUCOPHAGE) 1,000 mg tablet, TAKE 1 TABLET BY MOUTH TWICE DAILY WITH MEALS, Disp: 180 Tablet, Rfl: 3    aspirin delayed-release 81 mg tablet, Take  by mouth daily. , Disp: , Rfl:     Blood-Glucose Meter misc, Uncontrolled DM2 E11.65. BID testing. Disp 1 meter, Disp: 1 Each, Rfl: 0    Lancets misc, Uncontrolled DM2. BID testing. Disp 2 boxes of 100 each with 3 refills for 90 day supply. , Disp: 2 Package, Rfl: 3    Diabetic Supplies, [address] (LANCING DEVICE WITH LANCETS) misc, DM2 uncontrolled - BG testing BID., Disp: 1 Each, Rfl: 0    olopatadine (PATANOL) 0.1 % ophthalmic solution, Administer 2 Drops to both eyes two (2) times daily as needed for Allergies.  (Patient taking differently: Administer 2 Drops to both eyes two (2) times a day.), Disp: 15 mL, Rfl: 3    omega-3 fatty acids-vitamin e (FISH OIL) 1,000 mg cap, Take 1 Cap by mouth two (2) times a day., Disp: 180 Cap, Rfl: 3         Review of Systems   Constitutional:  Negative for chills, fever and weight loss. HENT:  Negative for congestion, ear pain, hearing loss and sore throat. Eyes:  Negative for blurred vision and double vision. Respiratory:  Negative for cough, shortness of breath and wheezing. Cardiovascular:  Negative for chest pain, palpitations and leg swelling. Gastrointestinal:  Negative for abdominal pain, blood in stool, constipation, diarrhea, heartburn, melena, nausea and vomiting. Genitourinary:  Negative for dysuria and urgency. Musculoskeletal:  Negative for joint pain and myalgias. Skin:  Negative for itching and rash. Neurological:  Negative for dizziness, tingling, sensory change, focal weakness and headaches. Endo/Heme/Allergies:  Negative for environmental allergies. Psychiatric/Behavioral:  Negative for depression. The patient is not nervous/anxious and does not have insomnia. Visit Vitals  /76   Pulse (!) 45   Temp 97.6 °F (36.4 °C) (Temporal)   Resp 16   Ht 6' (1.829 m)   Wt 253 lb (114.8 kg)   SpO2 98%   BMI 34.31 kg/m²       Physical Exam  Vitals and nursing note reviewed. Constitutional:       General: He is not in acute distress. Appearance: Normal appearance. He is not ill-appearing. HENT:      Head: Normocephalic. Right Ear: Tympanic membrane, ear canal and external ear normal.      Left Ear: Tympanic membrane, ear canal and external ear normal.   Eyes:      Extraocular Movements: Extraocular movements intact. Conjunctiva/sclera: Conjunctivae normal.      Pupils: Pupils are equal, round, and reactive to light. Neck:      Vascular: No carotid bruit. Cardiovascular:      Rate and Rhythm: Normal rate and regular rhythm. Heart sounds: Normal heart sounds.    Pulmonary:      Effort: Pulmonary effort is normal.      Breath sounds: Normal breath sounds. Abdominal:      Palpations: Abdomen is soft. Tenderness: There is no abdominal tenderness. Musculoskeletal:         General: No deformity. Cervical back: Neck supple. Right lower leg: No edema. Left lower leg: No edema. Skin:     General: Skin is warm and dry. Neurological:      General: No focal deficit present. Mental Status: He is alert and oriented to person, place, and time. Psychiatric:         Mood and Affect: Mood normal.         Behavior: Behavior normal.                            ASSESSMENT and PLAN    ICD-10-CM ICD-9-CM    1. Medicare annual wellness visit, subsequent  Z00.00 V70.0       2. Type 2 diabetes mellitus with diabetic polyneuropathy, without long-term current use of insulin (HCC)  E11.42 250.60      357.2       3. Essential hypertension  I10 401.9       4. Hypercholesterolemia  E78.00 272.0       5. Coronary artery disease involving native coronary artery of native heart without angina pectoris  I25.10 414.01       6. Multilevel degenerative disc disease  M53.9 722.6       7.  Prostate cancer ([facility]  C61 185       Assessment:  Type 2 diabetes less well controlled  Hypertension in satisfactory control  Satisfactory lipid levels  No symptoms of cardiac ischemia  Right lumbar radiculopathy symptoms markedly improved subsequent to epidural steroid injection  Prostate cancer currently under treatment and followed by urology    Plan:  Options for management of diabetes discussed including dietary modification versus addition of additional medication, will address with lifestyle modification  Return for follow-up with in office hemoglobin A1c in 3 months  Continue current medications  Continue with urology follow-up as recommended by the consultant          Date of Service:  2022   Patient Name:  Yeimi Grimaldo   Patient :  1950     This is a Subsequent Medicare Annual Wellness Visit providing Personalized Prevention Plan Services (PPPS) (Performed 12 months after initial AWV and PPPS )     I have reviewed the patient's medical history in detail and updated the computerized patient record. History     Past Medical History:   Diagnosis Date    Abrasion of skin 1/25/2016    Acid reflux     ACP (advance care planning)     has not yet filled out ACP, discussed 4/1/16    ACP (advance care planning)     has not yet filled out ACP, discussed 4/1/16     Aneurysm ([address]     Aortic root dilation ([address]     Borderline on echo June 8 2015 (done through [facility])    Arthritis     Avascular necrosis of bones of both hips ([address] 4/19/2016    MRI L-spine revealed bialteral AVN femoral heads (MRI/CT dx, report submitted for scannin)     Avascular necrosis of femoral head ([address] 2/2015    BILATERAL, noted on CT 2/24/15 ([facility]) - Avascular necrosis involving both femoral heads. No femoral head collapse identified.     Back pain, thoracic     Bilateral arm pain 1/25/2016    Bilateral low back pain without sciatica 1/25/2016    BMI 33.0-33.9,adult     Bone lesion left femur     Asymptomatic, MRI planned for October    Brachial neuritis 3/24/2014    Carpal tunnel syndrome, bilateral     Cervical spondylosis     C4-C6    Chest pain 12/15/2017    Chronic pain     Compression fracture of thoracic vertebra (HCC) 1/2016    Coronary artery disease involving native coronary artery of native heart without angina pectoris     DDD (degenerative disc disease), lumbosacral     DDD (degenerative disc disease), lumbosacral     Decreased libido 3/19/2014    3/19/2014:  Lack of interest recently -- Check testosterone levels, doubt this has anything to do with hx of aortic aneurysm surgery by Dr. Lisseth Tillman disorder     Diabetes ([address]     Elevated transaminase level     AST and ALT     Essential hypertension     Fall through floor 1/25/2016    GERD (gastroesophageal reflux disease)     Herniated nucleus pulposus     Lumbar diskectomy 1980, C4/5 HNP, has gotten ESIs in the past    History of poliomyelitis     History of shingles 7/2013    Right flank/back, after having vaccine    Hypercholesterolemia     Hypertension     Hypoglycemia 5/3/2017    Hypogonadism male     Impingement syndrome of left shoulder 8/2013    Internal hemorrhoids     Long term (current) use of anticoagulants     baby asa    Low serum testosterone level 3/24/2014    3/21/14:  Serum testosterone 179 (L), free testosterone 5.1 (L) -- Referring to urology to discuss prostate screening and options for repletion     Normal cardiac stress test 9/18/2012 9/18/2012 -- Normal Lexiscan cardiac stress and rest perfusion study, EF 62% (read by Dr. Tyrese Rodgers, [facility]), David July 9/21/2012 -- Holter monitor -- Arash Bail 44%, tachy 0%, no arrhythmias detected, some premature atrial and ventricular beats 6/17/2010 -- Normal Lexiscan cardiac stress and rest perfusion study, EF 55% (read by Dr. Aliyah Yao) MONICA [place]     NELSON (obstructive sleep apnea)     Unable to tolerate CPAP    Osteoarthritis of right knee 11/13/2013    Xrays ([facility]) R knee - 11/12/13 -- Mild medial compartment DJD -- s/p steroid injection Nov 2013 [Dr. Freedom Mayberryrs which resolved knee pain after about a week     Pulmonary nodule, left 11/21/2013    Single 0.5 cm posterior lateral aspect of left lower lobe pleural-based pulmonary nodule;  Incidental finding, stable from 11/2008 CT to 6/1/2010 CT     Restless leg syndrome     Routine adult health maintenance 11/21/2013    -- Colon cancer:   Colonoscopy done 11/29/2005 at [facility] -- Vaccinations:   Herpes Zoster vaccine given 2012 at [facility]    S/P AAA repair     Dr. Misty Hyde following, q3 year duplex of aorta    Seasonal allergic reaction     Sleep apnea     Spinal stenosis in cervical region 3/24/2014    Type 2 diabetes mellitus, controlled ([address]       Past Surgical History:   Procedure Laterality Date    HX LUMBAR DISKECTOMY N/A 1980    HX ORTHOPAEDIC      left shoulder    HX SHOULDER ARTHROSCOPY  01/19/2015    Arthroscopy shoulder (Left) Procedure: ARTHROSCOPY SHOULDER ROTATOR CUFF REPAIR;  Surgeon: Damian Jean MD;  Location: [address] [place] [address] OR [facility]; Service: Orthopedics; Laterality: Left;  Left Shoulder Arthroscopy, Subacromial Decompression, bicep tenotomy, debridment, [place]     VASCULAR SURGERY PROCEDURE UNLIST N/A     AAA repair with stent, US's at [facility]     Current Outpatient Medications   Medication Sig Dispense Refill    linaGLIPtin (TRADJENTA) 5 mg tablet Take 1 Tablet by mouth in the morning. 90 Tablet 3    ALPRAZolam (XANAX) 0.5 mg tablet Take 1 tablet 45 minutes prior to radiology procedure 5 Tablet 0    telmisartan (MICARDIS) 80 mg tablet Take 1 Tablet by mouth daily. 90 Tablet 3    glucose blood VI test strips (FreeStyle Lite Strips) strip ICD10 E11.65 - DM2 daily BG testing. 100 Strip 3    atorvastatin (LIPITOR) 80 mg tablet TAKE 1 TABLET BY MOUTH ONCE DAILY 90 Tablet 4    TURMERIC PO Take 500 mg by mouth two (2) times a day. metFORMIN (GLUCOPHAGE) 1,000 mg tablet TAKE 1 TABLET BY MOUTH TWICE DAILY WITH MEALS 180 Tablet 3    aspirin delayed-release 81 mg tablet Take  by mouth daily. Blood-Glucose Meter misc Uncontrolled DM2 E11.65. BID testing. Disp 1 meter 1 Each 0    Lancets misc Uncontrolled DM2. BID testing. Disp 2 boxes of 100 each with 3 refills for 90 day supply. 2 Package 3    Diabetic Supplies, [place]. (LANCING DEVICE WITH LANCETS) misc DM2 uncontrolled - BG testing BID. 1 Each 0    olopatadine (PATANOL) 0.1 % ophthalmic solution Administer 2 Drops to both eyes two (2) times daily as needed for Allergies. (Patient taking differently: Administer 2 Drops to both eyes two (2) times a day.) 15 mL 3    omega-3 fatty acids-vitamin e (FISH OIL) 1,000 mg cap Take 1 Cap by mouth two (2) times a day.  180 Cap 3     Allergies   Allergen Reactions    Zocor [Simvastatin] Myalgia     Family History   Problem Relation Age of Onset    Cancer Mother Rectal, metastasized late 76s    Heart Disease Father     Cancer Brother         Metastatic, not sure of primary    Cancer Maternal Uncle     Cancer Maternal Aunt      Social History     Tobacco Use    Smoking status: Former     Types: Cigarettes, Pipe    Smokeless tobacco: Former    Tobacco comments:     Quit 43 years ago (2015), pipe 10+ years ago   Substance Use Topics    Alcohol use: Yes     Alcohol/week: 0.0 standard drinks     Comment: Occasional beer, liquor or wine. No heavy use     Patient Active Problem List   Diagnosis Code    Type 2 diabetes mellitus, without long-term current use of insulin ([facility]) E11.9    Hypercholesterolemia E78.00    Arthritis M19.90    Acid reflux K21.9    Restless leg syndrome G25.81    Multilevel degenerative disc disease M53.9    Hypogonadism male E29.1    NELSON (obstructive sleep apnea) G47.33    Essential hypertension I10    BMI 33.0-33.9,adult Z68.33    S/P AAA repair Z98.890, Z86.79    Decreased GFR R94.4    Chronic seasonal allergic rhinitis due to pollen J30.1    Tinnitus of both ears H93.13    Thoracic compression fracture ([facility]) S22.000A    Bone lesion left femur M89.9    Diabetic polyneuropathy associated with type 2 diabetes mellitus ([facility]) E11.42    Symptomatic bradycardia R00.1    Coronary artery disease involving native coronary artery of native heart without angina pectoris I25.10    Constipation K59.00    History of colonoscopy with polypectomy Z98.890, Z86.010       Living situation:   -- Lives with family  -- Stairs in home yes    Diet, Lifestyle: nonsmoker    Exercise level: moderately active    Depression Risk Factor Screening:     3 most recent PHQ Screens 5/4/2022   PHQ Not Done -   Little interest or pleasure in doing things Not at all   Feeling down, depressed, irritable, or hopeless Not at all   Total Score PHQ 2 0     Alcohol Risk Factor Screening: You do not drink alcohol or very rarely.     Functional Ability and Level of Safety:     Hearing Loss Hearing is good. Activities of Daily Living   Self-care. Requires assistance with: no ADLs    Fall Risk     Fall Risk Assessment, last 12 mths 8/30/2021   Able to walk? Yes   Fall in past 12 months? 0   Do you feel unsteady? 0   Are you worried about falling 0   Is the gait abnormal? -   Number of falls in past 12 months -   Fall with injury? -     Abuse Screen   Patient is not abused    Review of Systems   Review of systems reported in the separate problem-oriented documentation. Physical Examination   Physical exam reported in the separate problem-oriented documentation. Evaluation of Cognitive Function:  Mood/affect:  happy  Appearance: age appropriate  Family member/caregiver input: None    Patient Care Team:  Wilner Miller MD as PCP - General (Family Medicine)  Wilner Miller MD as PCP - REHABILITATION [facility] Empaneled Provider  Didier Barraza MD as Consulting Provider (Orthopedic Surgery)  Dago Allen MD (General Surgery)  Braden Smallwood MD (Vascular Surgery)  Alana Nance DO as Consulting Provider (Physical Medicine and Rehabilitation Physician)  Dc Harper MD as Consulting Provider (Neurosurgery)  Carmen Gama MD as Consulting Provider (Cardiovascular Disease Physician)    Advice/Counseling   Education and counseling provided:  Advance directives counseling/discussion      Assessment/Plan       ICD-10-CM ICD-9-CM    1. Medicare annual wellness visit, subsequent  Z00.00 V70.0       2. Type 2 diabetes mellitus with diabetic polyneuropathy, without long-term current use of insulin (HCC)  E11.42 250.60  DIABETES FOOT EXAM     357.2       3. Essential hypertension  I10 401.9       4. Hypercholesterolemia  E78.00 272.0       5. Coronary artery disease involving native coronary artery of native heart without angina pectoris  I25.10 414.01       6. Multilevel degenerative disc disease  M53.9 722.6       7.  Prostate cancer ([facility]  C61 185         Health maintenance:      5-year personalized preventative services plan:  Complete and return advance directives form  Influenza immunization recommended later this fall  Diabetic eye exam due January 2023  Colorectal cancer screening (colonoscopy)-family history of mother with rectal cancer, overdue for colonoscopy but will defer this until after prostate radiation treatment is completed  Medicare wellness evaluation due August 2023          Scot Shetty was provided a written 5-year personalized preventive services plan, which was included in his AVS.    Danelle Viveros MD      PLEASE NOTE:   This document has been produced using voice recognition software. Unrecognized errors in transcription may be present.

## 2022-08-19 NOTE — TELEPHONE ENCOUNTER
Patient requesting refill to be sent to mail order [facility] .  Last OV 8/11/22   Future Appointments   Date Time Provider Nora Bullard   11/3/2022  8:00 AM MD NIR Ashley BS [facility]

## 2022-09-30 DIAGNOSIS — I10 ESSENTIAL HYPERTENSION: ICD-10-CM

## 2022-09-30 RX ORDER — TELMISARTAN 80 MG/1
80 TABLET ORAL DAILY
Qty: 90 TABLET | Refills: 3 | Status: SHIPPED | OUTPATIENT
Start: 2022-09-30

## 2022-09-30 NOTE — TELEPHONE ENCOUNTER
Patient called and would like the rx for the 80 mg telmisartan sent to [facility] . He dosen't want to take the printed rx out in this mess. last OV 8/11/22

## 2022-11-02 NOTE — PROGRESS NOTES
HISTORY OF PRESENT ILLNESS  Julisa Laboy is a 74 y.o. male. He returns for follow-up after evaluation 3 months ago revealed a suboptimal hemoglobin A1c resulting in the following assessment and plan:  Assessment:  Type 2 diabetes less well controlled  Hypertension in satisfactory control  Satisfactory lipid levels  No symptoms of cardiac ischemia  Right lumbar radiculopathy symptoms markedly improved subsequent to epidural steroid injection  Prostate cancer currently under treatment and followed by urology    Plan:  Options for management of diabetes discussed including dietary modification versus addition of additional medication, will address with lifestyle modification  Return for follow-up with in office hemoglobin A1c in 3 months  Continue current medications  Continue with urology follow-up as recommended by the consultant    Mr#: [id number]      Past Medical History:   Diagnosis Date    Abrasion of skin 1/25/2016    Acid reflux     ACP (advance care planning)     has not yet filled out ACP, discussed 4/1/16    ACP (advance care planning)     has not yet filled out ACP, discussed 4/1/16     Aneurysm ([address]     Aortic root dilation ([address]     Borderline on echo June 8 2015 (done through [facility])    Arthritis     Avascular necrosis of bones of both hips ([address] 4/19/2016    MRI L-spine revealed bialteral AVN femoral heads (MRI/CT dx, report submitted for scannin)     Avascular necrosis of femoral head ([address] 2/2015    BILATERAL, noted on CT 2/24/15 ([place]) - Avascular necrosis involving both femoral heads. No femoral head collapse identified.     Back pain, thoracic     Bilateral arm pain 1/25/2016    Bilateral low back pain without sciatica 1/25/2016    BMI 33.0-33.9,adult     Bone lesion left femur     Asymptomatic, MRI planned for October    Brachial neuritis 3/24/2014    Carpal tunnel syndrome, bilateral     Cervical spondylosis     C4-C6    Chest pain 12/15/2017    Chronic pain     Compression fracture of thoracic vertebra ([facility] 1/2016    Coronary artery disease involving native coronary artery of native heart without angina pectoris     DDD (degenerative disc disease), lumbosacral     DDD (degenerative disc disease), lumbosacral     Decreased libido 3/19/2014    3/19/2014:  Lack of interest recently -- Check testosterone levels, doubt this has anything to do with hx of aortic aneurysm surgery by Dr. Erica Monaco disorder     Diabetes ([facility]     Elevated transaminase level     AST and ALT     Essential hypertension     Fall through floor 1/25/2016    GERD (gastroesophageal reflux disease)     Herniated nucleus pulposus     Lumbar diskectomy 1980, C4/5 HNP, has gotten ESIs in the past    History of poliomyelitis     History of shingles 7/2013    Right flank/back, after having vaccine    Hypercholesterolemia     Hypertension     Hypoglycemia 5/3/2017    Hypogonadism male     Impingement syndrome of left shoulder 8/2013    Internal hemorrhoids     Long term (current) use of anticoagulants     baby asa    Low serum testosterone level 3/24/2014    3/21/14:  Serum testosterone 179 (L), free testosterone 5.1 (L) -- Referring to urology to discuss prostate screening and options for repletion     Normal cardiac stress test 9/18/2012 9/18/2012 -- Normal Lexiscan cardiac stress and rest perfusion study, EF 62% (read by Dr. Hanh Mike, [facility]), Clotletty Quiroz 9/21/2012 -- Holter monitor -- [place] Room 44%, tachy 0%, no arrhythmias detected, some premature atrial and ventricular beats 6/17/2010 -- Normal Lexiscan cardiac stress and rest perfusion study, EF 55% (read by Dr. Nicola Garvey) [place]     NELSON (obstructive sleep apnea)     Unable to tolerate CPAP    Osteoarthritis of right knee 11/13/2013    Xrays ([facility]) R knee - 11/12/13 -- Mild medial compartment DJD -- s/p steroid injection Nov 2013 [Dr. Karolina Daley which resolved knee pain after about a week     Pulmonary nodule, left 11/21/2013    Single 0.5 cm posterior lateral aspect of left lower lobe pleural-based pulmonary nodule; Incidental finding, stable from 11/2008 CT to 6/1/2010 CT     Restless leg syndrome     Routine adult health maintenance 11/21/2013    -- Colon cancer:   Colonoscopy done 11/29/2005 at [facility] -- Vaccinations:   Herpes Zoster vaccine given 2012 at [facility]    S/P AAA repair     Dr. Monica Apple following, q3 year duplex of aorta    Seasonal allergic reaction     Sleep apnea     Spinal stenosis in cervical region 3/24/2014    Type 2 diabetes mellitus, controlled ([address]        Past Surgical History:   Procedure Laterality Date    HX COLONOSCOPY  06/10/2015    Negative study, 5-year follow-up recommended because of family history, Dr. Omega Rader      left shoulder    HX SHOULDER ARTHROSCOPY  01/19/2015    Arthroscopy shoulder (Left) Procedure: ARTHROSCOPY SHOULDER ROTATOR CUFF REPAIR;  Surgeon: Wendi Marino MD;  Location: [facility] OR LOC; Service: Orthopedics; Laterality: Left;  Left Shoulder Arthroscopy, Subacromial Decompression, bicep tenotomy, debridment, [place]     VASCULAR SURGERY PROCEDURE UNLIST N/A     AAA repair with stent, US's at [facility]       Family History   Problem Relation Age of Onset    Cancer Mother         Rectal, metastasized late 76s    Heart Disease Father     Cancer Brother         Metastatic, not sure of primary    Cancer Maternal Aunt     Cancer Maternal Uncle        Allergies   Allergen Reactions    Zocor [Simvastatin] Myalgia       Social History     Tobacco Use   Smoking Status Former    Types: Cigarettes, Pipe   Smokeless Tobacco Former   Tobacco Comments    Quit 43 years ago (2015), pipe 10+ years ago       Social History     Substance and Sexual Activity   Alcohol Use Yes    Alcohol/week: 0.0 standard drinks    Comment: Occasional beer, liquor or wine.   No heavy use           Patient Active Problem List   Diagnosis Code    Type 2 diabetes mellitus, without long-term current use of insulin ([facility] E11.9    Hypercholesterolemia E78.00    Arthritis M19.90    Acid reflux K21.9    Restless leg syndrome G25.81    Multilevel degenerative disc disease M53.9    Hypogonadism male E29.1    NELSON (obstructive sleep apnea) G47.33    Essential hypertension I10    BMI 33.0-33.9,adult Z68.33    S/P AAA repair Z98.890, Z86.79    Decreased GFR R94.4    Chronic seasonal allergic rhinitis due to pollen J30.1    Tinnitus of both ears H93.13    Thoracic compression fracture (HCC) S22.000A    Bone lesion left femur M89.9    Diabetic polyneuropathy associated with type 2 diabetes mellitus (HCC) E11.42    Symptomatic bradycardia R00.1    Coronary artery disease involving native coronary artery of native heart without angina pectoris I25.10    Constipation K59.00    History of colonoscopy with polypectomy Z98.890, Z86.010         Current Outpatient Medications:     telmisartan (MICARDIS) 80 mg tablet, Take 1 Tablet by mouth daily. , Disp: 90 Tablet, Rfl: 3    linaGLIPtin (TRADJENTA) 5 mg tablet, Take 1 Tablet by mouth daily. , Disp: 90 Tablet, Rfl: 3    glucose blood VI test strips (FreeStyle Lite Strips) strip, ICD10 E11.65 - DM2 daily BG testing., Disp: 100 Strip, Rfl: 3    atorvastatin (LIPITOR) 80 mg tablet, TAKE 1 TABLET BY MOUTH ONCE DAILY, Disp: 90 Tablet, Rfl: 4    TURMERIC PO, Take 500 mg by mouth two (2) times a day., Disp: , Rfl:     metFORMIN (GLUCOPHAGE) 1,000 mg tablet, TAKE 1 TABLET BY MOUTH TWICE DAILY WITH MEALS, Disp: 180 Tablet, Rfl: 3    aspirin delayed-release 81 mg tablet, Take  by mouth daily. , Disp: , Rfl:     Blood-Glucose Meter misc, Uncontrolled DM2 E11.65. BID testing. Disp 1 meter, Disp: 1 Each, Rfl: 0    Lancets misc, Uncontrolled DM2. BID testing. Disp 2 boxes of 100 each with 3 refills for 90 day supply. , Disp: 2 Package, Rfl: 3    Diabetic Supplies, [address]  (LANCING DEVICE WITH LANCETS) misc, DM2 uncontrolled - BG testing BID., Disp: 1 Each, Rfl: 0    omega-3 fatty acids-vitamin e (FISH OIL) 1,000 mg cap, Take 1 Cap by mouth two (2) times a day., Disp: 180 Cap, Rfl: 3         Review of Systems   Constitutional:  Negative for fever and weight loss. Cardiovascular:  Negative for chest pain and palpitations. Genitourinary:  Negative for frequency. Neurological:  Negative for tingling. Endo/Heme/Allergies:  Negative for polydipsia. Visit Vitals  /82   Pulse (!) 43   Temp 97.7 °F (36.5 °C) (Temporal)   Resp 16   Ht 6' (1.829 m)   Wt 252 lb (114.3 kg)   SpO2 97%   BMI 34.18 kg/m²      Physical Exam  Vitals and nursing note reviewed. Constitutional:       General: He is not in acute distress. Appearance: Normal appearance. He is well-developed. He is obese. He is not ill-appearing. HENT:      Head: Normocephalic. Eyes:      Extraocular Movements: Extraocular movements intact. Cardiovascular:      Rate and Rhythm: Normal rate. Pulmonary:      Effort: Pulmonary effort is normal.   Neurological:      Mental Status: He is alert and oriented to person, place, and time. Psychiatric:         Mood and Affect: Mood normal.         Behavior: Behavior normal.      Latest Reference Range & Units 3/1/22 08:12 8/8/22 08:08 11/3/22 07:56   Triglyceride <150 MG/DL  144    Cholesterol, total <200 MG/DL  123    HDL Cholesterol 40 - 60 MG/DL  38 (L)    CHOL/HDL Ratio 0 - 5.0    3.2    LDL, calculated 0 - 100 MG/DL  56.2    VLDL, calculated MG/DL  28.8    Hemoglobin A1c, (calculated) 4.2 - 5.6 %  7.4 (H)    Hemoglobin A1c (POC) % 7.0  7.9   ASSESSMENT and PLAN    ICD-10-CM ICD-9-CM    1. Type 2 diabetes mellitus with diabetic polyneuropathy, without long-term current use of insulin (HCC)  E11.42 250.60 AMB POC HEMOGLOBIN A1C     357.2 empagliflozin (JARDIANCE) 10 mg tablet      2. Essential hypertension  I10 401.9       3.  Family history of colon cancer in mother  Z80.0 V16.0 REFERRAL TO GASTROENTEROLOGY      Assessment:  Type 2 diabetes less well controlled  Hypertension in satisfactory control  Family history colon cancer    Health maintenance recommendations:  COVID-19 immunization with bivalent vaccine completed  Influenza immunization-completed  Colorectal cancer screening-colonoscopy was due at a 5-year interval in 2020 because of family history, mother with rectal carcinoma    Plan:  Continue current medications and begin Jardiance 10 mg daily  Continue to avoid dietary salt, starch and sugar and follow program of regular aerobic exercise  Return for follow-up with in office hemoglobin A1c in 4 months or sooner with any problems  Please always arrive at least 15 minutes before your scheduled appointment time  Vasu Lind MD      PLEASE NOTE:   This document has been produced using voice recognition software. Unrecognized errors in transcription may be present.

## 2022-11-03 ENCOUNTER — OFFICE VISIT (OUTPATIENT)
Dept: FAMILY MEDICINE CLINIC | Age: 72
End: 2022-11-03
Payer: MEDICARE

## 2022-11-03 VITALS
SYSTOLIC BLOOD PRESSURE: 128 MMHG | DIASTOLIC BLOOD PRESSURE: 82 MMHG | TEMPERATURE: 97.7 F | BODY MASS INDEX: 34.13 KG/M2 | OXYGEN SATURATION: 97 % | RESPIRATION RATE: 16 BRPM | HEART RATE: 43 BPM | HEIGHT: 72 IN | WEIGHT: 252 LBS

## 2022-11-03 DIAGNOSIS — I10 ESSENTIAL HYPERTENSION: ICD-10-CM

## 2022-11-03 DIAGNOSIS — E11.42 TYPE 2 DIABETES MELLITUS WITH DIABETIC POLYNEUROPATHY, WITHOUT LONG-TERM CURRENT USE OF INSULIN (HCC): Primary | ICD-10-CM

## 2022-11-03 DIAGNOSIS — Z80.0 FAMILY HISTORY OF COLON CANCER IN MOTHER: ICD-10-CM

## 2022-11-03 LAB — HBA1C MFR BLD HPLC: 7.9 %

## 2022-11-03 PROCEDURE — 83036 HEMOGLOBIN GLYCOSYLATED A1C: CPT | Performed by: FAMILY MEDICINE

## 2022-11-03 PROCEDURE — G8427 DOCREV CUR MEDS BY ELIG CLIN: HCPCS | Performed by: FAMILY MEDICINE

## 2022-11-03 PROCEDURE — 3017F COLORECTAL CA SCREEN DOC REV: CPT | Performed by: FAMILY MEDICINE

## 2022-11-03 PROCEDURE — 1101F PT FALLS ASSESS-DOCD LE1/YR: CPT | Performed by: FAMILY MEDICINE

## 2022-11-03 PROCEDURE — 1123F ACP DISCUSS/DSCN MKR DOCD: CPT | Performed by: FAMILY MEDICINE

## 2022-11-03 PROCEDURE — 3051F HG A1C>EQUAL 7.0%<8.0%: CPT | Performed by: FAMILY MEDICINE

## 2022-11-03 PROCEDURE — 99214 OFFICE O/P EST MOD 30 MIN: CPT | Performed by: FAMILY MEDICINE

## 2022-11-03 PROCEDURE — G8432 DEP SCR NOT DOC, RNG: HCPCS | Performed by: FAMILY MEDICINE

## 2022-11-03 PROCEDURE — 3078F DIAST BP <80 MM HG: CPT | Performed by: FAMILY MEDICINE

## 2022-11-03 PROCEDURE — 2022F DILAT RTA XM EVC RTNOPTHY: CPT | Performed by: FAMILY MEDICINE

## 2022-11-03 PROCEDURE — 3074F SYST BP LT 130 MM HG: CPT | Performed by: FAMILY MEDICINE

## 2022-11-03 PROCEDURE — G8754 DIAS BP LESS 90: HCPCS | Performed by: FAMILY MEDICINE

## 2022-11-03 PROCEDURE — G8536 NO DOC ELDER MAL SCRN: HCPCS | Performed by: FAMILY MEDICINE

## 2022-11-03 PROCEDURE — G8752 SYS BP LESS 140: HCPCS | Performed by: FAMILY MEDICINE

## 2022-11-03 PROCEDURE — G8417 CALC BMI ABV UP PARAM F/U: HCPCS | Performed by: FAMILY MEDICINE

## 2022-11-03 NOTE — PROGRESS NOTES
Basilio Mayo is a 74 y.o. male (: 1950) presenting to address:    Chief Complaint   Patient presents with    Diabetes       Vitals:    22 0749   BP: 128/82   Pulse: (!) 43   Resp: 16   Temp: 97.7 °F (36.5 °C)   TempSrc: Temporal   SpO2: 97%   Weight: 252 lb (114.3 kg)   Height: 6' (1.829 m)   PainSc:   0 - No pain       Hearing/Vision:   No results found. Learning Assessment:     Learning Assessment 2020   PRIMARY LEARNER Patient   HIGHEST LEVEL OF EDUCATION - PRIMARY LEARNER  SOME COLLEGE   BARRIERS PRIMARY LEARNER NONE   CO-LEARNER CAREGIVER No   PRIMARY LANGUAGE ENGLISH    NEED No   LEARNER PREFERENCE PRIMARY DEMONSTRATION   ANSWERED BY patient   RELATIONSHIP SELF     Depression Screening:     3 most recent PHQ Screens 2022   PHQ Not Done -   Little interest or pleasure in doing things Not at all   Feeling down, depressed, irritable, or hopeless Not at all   Total Score PHQ 2 0     Fall Risk Assessment:     Fall Risk Assessment, last 12 mths 2022   Able to walk? Yes   Fall in past 12 months? 0   Do you feel unsteady? 0   Are you worried about falling 0   Is the gait abnormal? -   Number of falls in past 12 months -   Fall with injury? -     Abuse Screening:     Abuse Screening Questionnaire 2021   Do you ever feel afraid of your partner? N   Are you in a relationship with someone who physically or mentally threatens you? N   Is it safe for you to go home?  Y     ADL Assessment:     ADL Assessment 2022   Feeding yourself No Help Needed   Getting from bed to chair No Help Needed   Getting dressed No Help Needed   Bathing or showering No Help Needed   Walk across the room (includes cane/walker) No Help Needed   Using the telphone No Help Needed   Taking your medications No Help Needed   Preparing meals No Help Needed   Managing money (expenses/bills) No Help Needed   Moderately strenuous housework (laundry) No Help Needed   Shopping for personal items (toiletries/medicines) No Help Needed   Shopping for groceries No Help Needed   Driving No Help Needed   Climbing a flight of stairs No Help Needed   Getting to places beyond walking distances No Help Needed        Coordination of Care Questionaire:   1. \"Have you been to the ER, urgent care clinic since your last visit? Hospitalized since your last visit? \" No    2. \"Have you seen or consulted any other health care providers outside of the [address] [place] [address] [place] since your last visit? \" No     3. For patients aged 39-70: Has the patient had a colonoscopy? Yes care gap present       Advanced Directive:   1. Do you have an Advanced Directive? NO    2. Would you like information on Advanced Directives?  NO

## 2022-11-03 NOTE — PATIENT INSTRUCTIONS
Assessment:  Type 2 diabetes less well controlled  Hypertension in satisfactory control  Family history colon cancer    Health maintenance recommendations:  COVID-19 immunization with bivalent vaccine completed  Influenza immunization-completed  Colorectal cancer screening-colonoscopy was due at a 5-year interval in 2020 because of family history, mother with rectal carcinoma    Plan:  Continue current medications and begin Jardiance 10 mg daily  Continue to avoid dietary salt, starch and sugar and follow program of regular aerobic exercise  Return for follow-up with in office hemoglobin A1c in 4 months or sooner with any problems  Please always arrive at least 15 minutes before your scheduled appointment time

## 2023-02-17 ENCOUNTER — TELEPHONE (OUTPATIENT)
Dept: FAMILY MEDICINE CLINIC | Facility: CLINIC | Age: 73
End: 2023-02-17

## 2023-02-17 NOTE — TELEPHONE ENCOUNTER
I called patient and notified him of the name of the gastro office he was referred to . He was driving at the time and asked that I call back and leave the info on his voicemail .which I did .

## 2023-02-17 NOTE — TELEPHONE ENCOUNTER
----- Message from Myles Fountain sent at 2/17/2023  2:19 PM EST -----  Subject: Message to Provider    QUESTIONS  Information for Provider? Patient received a call for a coloscopy. Wants   to know who called him  ---------------------------------------------------------------------------  --------------  Sandy Becker INFO  [phone]; OK to leave message on voicemail  ---------------------------------------------------------------------------  --------------  SCRIPT ANSWERS  Relationship to Patient?  Self

## 2023-02-20 RX ORDER — ATORVASTATIN CALCIUM 80 MG/1
TABLET, FILM COATED ORAL
Qty: 90 TABLET | Refills: 3 | Status: SHIPPED | OUTPATIENT
Start: 2023-02-20

## 2023-02-20 NOTE — TELEPHONE ENCOUNTER
Last refilled 11/30/21 for 90 with 4 refills .  Last OV 11/3/22   Future Appointments   Date Time Provider Aruna López   2/22/2023  8:45 AM Lauryn Mcnair MD [facility]

## 2023-03-02 ENCOUNTER — TELEPHONE (OUTPATIENT)
Dept: FAMILY MEDICINE CLINIC | Facility: CLINIC | Age: 73
End: 2023-03-02

## 2023-03-02 NOTE — TELEPHONE ENCOUNTER
Please advise Mr. Walker Nieto that we are able to see a note from Dr. Kwesi Coleman in his record which states \"PSA trending down, please forward to patient, follow-up as scheduled\"  Additional details would be available from the urology office

## 2023-03-02 NOTE — TELEPHONE ENCOUNTER
Pt called in regards to PSA Test reults on 2/22/2023 with Dr Tray Hess. Pt has been waiting for results. Please advise.

## 2023-06-06 NOTE — TELEPHONE ENCOUNTER
Last refilled 11/3/22 for 90 with 1 refill.  Last OV 11/3/22   Future Appointments   Date Time Provider [address]   6/9/2023 11:50 AM [facility] NURSE [facility] Jennifer   6/23/2023  8:40 AM Wanda Carson PA-C [facility]

## 2023-07-10 NOTE — TELEPHONE ENCOUNTER
Pt called with Rx Refill Request. States that in the past he has been getting 90 days but this most recent refill he only got 30 days. Please advise. Pt states that he has 7 pills left.   Requested Prescriptions     Pending Prescriptions Disp Refills    empagliflozin (JARDIANCE) 10 MG tablet 30 tablet 0     Sig: Take 1 tablet by mouth daily

## 2023-07-11 NOTE — TELEPHONE ENCOUNTER
See previous message . Last refilled 6/6/23 for 30 days with 0 refills.  Last ov 11/30/2022   Future Appointments   Date Time Provider [address]   8/8/2023  1:30 PM Soto Hanna MD [facility] AMB   12/11/2023 11:10 AM [facility] [place] NURSE [facility] Sched   12/20/2023  8:40 AM Albert Gamez PA-C [facility]

## 2023-07-12 ENCOUNTER — TELEPHONE (OUTPATIENT)
Dept: FAMILY MEDICINE CLINIC | Facility: CLINIC | Age: 73
End: 2023-07-12

## 2023-07-12 DIAGNOSIS — E11.42 TYPE 2 DIABETES MELLITUS WITH DIABETIC POLYNEUROPATHY, WITHOUT LONG-TERM CURRENT USE OF INSULIN (HCC): Primary | ICD-10-CM

## 2023-07-12 NOTE — TELEPHONE ENCOUNTER
[facility] is only able to give the patient a 30 day supply of jardiance. He is requesting the remaining 60 be sent to [facility] since the 30 day supply has already been picked up.     Pharmacy updated     empagliflozin (JARDIANCE) 10 MG tablet  Dis 60

## 2023-07-21 ENCOUNTER — TELEPHONE (OUTPATIENT)
Dept: FAMILY MEDICINE CLINIC | Facility: CLINIC | Age: 73
End: 2023-07-21

## 2023-07-21 DIAGNOSIS — E11.42 TYPE 2 DIABETES MELLITUS WITH DIABETIC POLYNEUROPATHY, WITHOUT LONG-TERM CURRENT USE OF INSULIN (HCC): ICD-10-CM

## 2023-07-21 NOTE — TELEPHONE ENCOUNTER
Patient called requesting a call back from Sharmila in regards to his jardiance. Patient was informed that sharmila is out of the office. He understood.     Future Appointments   Date Time Provider [address] [place] [address]   8/8/2023  1:30 PM Florencia Guzman MD [facility] AMB   12/11/2023 11:10 AM [facility] NURSE [facility] [place] Sched   12/20/2023  8:40 AM Javad Robbins PA-C [facility]

## 2023-08-07 DIAGNOSIS — E78.00 HYPERCHOLESTEROLEMIA: ICD-10-CM

## 2023-08-07 DIAGNOSIS — I10 ESSENTIAL HYPERTENSION: ICD-10-CM

## 2023-08-07 DIAGNOSIS — E11.42 TYPE 2 DIABETES MELLITUS WITH DIABETIC POLYNEUROPATHY, WITHOUT LONG-TERM CURRENT USE OF INSULIN (HCC): Primary | ICD-10-CM

## 2023-08-07 ASSESSMENT — ENCOUNTER SYMPTOMS
ABDOMINAL PAIN: 0
BLOOD IN STOOL: 0
CONSTIPATION: 0
NAUSEA: 0
VOMITING: 0
SORE THROAT: 0
CHEST TIGHTNESS: 0
COUGH: 0
ANAL BLEEDING: 0
WHEEZING: 0
SHORTNESS OF BREATH: 0
DIARRHEA: 0

## 2023-08-08 ENCOUNTER — OFFICE VISIT (OUTPATIENT)
Dept: FAMILY MEDICINE CLINIC | Facility: CLINIC | Age: 73
End: 2023-08-08
Payer: MEDICARE

## 2023-08-08 VITALS
OXYGEN SATURATION: 96 % | HEART RATE: 57 BPM | RESPIRATION RATE: 16 BRPM | TEMPERATURE: 97.3 F | SYSTOLIC BLOOD PRESSURE: 140 MMHG | WEIGHT: 242 LBS | DIASTOLIC BLOOD PRESSURE: 70 MMHG | BODY MASS INDEX: 32.78 KG/M2 | HEIGHT: 72 IN

## 2023-08-08 DIAGNOSIS — I25.10 CORONARY ARTERY DISEASE INVOLVING NATIVE CORONARY ARTERY OF NATIVE HEART WITHOUT ANGINA PECTORIS: ICD-10-CM

## 2023-08-08 DIAGNOSIS — C61 MALIGNANT NEOPLASM OF PROSTATE (HCC): ICD-10-CM

## 2023-08-08 DIAGNOSIS — I10 ESSENTIAL HYPERTENSION: ICD-10-CM

## 2023-08-08 DIAGNOSIS — Z86.79 S/P AAA REPAIR: ICD-10-CM

## 2023-08-08 DIAGNOSIS — Z01.818 PREOP EXAMINATION: Primary | ICD-10-CM

## 2023-08-08 DIAGNOSIS — Z98.890 S/P AAA REPAIR: ICD-10-CM

## 2023-08-08 DIAGNOSIS — E11.42 TYPE 2 DIABETES MELLITUS WITH DIABETIC POLYNEUROPATHY, WITHOUT LONG-TERM CURRENT USE OF INSULIN (HCC): ICD-10-CM

## 2023-08-08 DIAGNOSIS — E78.00 HYPERCHOLESTEROLEMIA: ICD-10-CM

## 2023-08-08 DIAGNOSIS — Z00.00 MEDICARE ANNUAL WELLNESS VISIT, SUBSEQUENT: ICD-10-CM

## 2023-08-08 LAB — HBA1C MFR BLD: 9.5 %

## 2023-08-08 PROCEDURE — 99214 OFFICE O/P EST MOD 30 MIN: CPT | Performed by: FAMILY MEDICINE

## 2023-08-08 PROCEDURE — G0439 PPPS, SUBSEQ VISIT: HCPCS | Performed by: FAMILY MEDICINE

## 2023-08-08 PROCEDURE — 3017F COLORECTAL CA SCREEN DOC REV: CPT | Performed by: FAMILY MEDICINE

## 2023-08-08 PROCEDURE — 3077F SYST BP >= 140 MM HG: CPT | Performed by: FAMILY MEDICINE

## 2023-08-08 PROCEDURE — G8417 CALC BMI ABV UP PARAM F/U: HCPCS | Performed by: FAMILY MEDICINE

## 2023-08-08 PROCEDURE — 1036F TOBACCO NON-USER: CPT | Performed by: FAMILY MEDICINE

## 2023-08-08 PROCEDURE — G8427 DOCREV CUR MEDS BY ELIG CLIN: HCPCS | Performed by: FAMILY MEDICINE

## 2023-08-08 PROCEDURE — 83036 HEMOGLOBIN GLYCOSYLATED A1C: CPT | Performed by: FAMILY MEDICINE

## 2023-08-08 PROCEDURE — 3046F HEMOGLOBIN A1C LEVEL >9.0%: CPT | Performed by: FAMILY MEDICINE

## 2023-08-08 PROCEDURE — 3078F DIAST BP <80 MM HG: CPT | Performed by: FAMILY MEDICINE

## 2023-08-08 PROCEDURE — 1123F ACP DISCUSS/DSCN MKR DOCD: CPT | Performed by: FAMILY MEDICINE

## 2023-08-08 PROCEDURE — 2022F DILAT RTA XM EVC RTNOPTHY: CPT | Performed by: FAMILY MEDICINE

## 2023-08-08 RX ORDER — POLYETHYLENE GLYCOL 3350 17 G/17G
17 POWDER, FOR SOLUTION ORAL DAILY PRN
COMMUNITY

## 2023-08-08 RX ORDER — AMMONIUM LACTATE 12 G/100G
CREAM TOPICAL 2 TIMES DAILY
COMMUNITY
Start: 2023-07-21

## 2023-08-08 RX ORDER — TELMISARTAN 80 MG/1
80 TABLET ORAL DAILY
Qty: 90 TABLET | Refills: 3 | Status: SHIPPED | OUTPATIENT
Start: 2023-08-08

## 2023-08-08 RX ORDER — BLOOD-GLUCOSE METER
KIT MISCELLANEOUS
Qty: 100 EACH | Refills: 3 | Status: SHIPPED | OUTPATIENT
Start: 2023-08-08

## 2023-08-08 RX ORDER — PSYLLIUM SEED (WITH DEXTROSE)
3.4 POWDER (GRAM) ORAL DAILY PRN
COMMUNITY

## 2023-08-08 RX ORDER — DOCUSATE SODIUM, 50 MG SENNOSIDES, 8.6 MG 8.6; 5 1/1; 1/1
CAPSULE, GELATIN COATED ORAL DAILY PRN
COMMUNITY

## 2023-08-08 SDOH — ECONOMIC STABILITY: FOOD INSECURITY: WITHIN THE PAST 12 MONTHS, YOU WORRIED THAT YOUR FOOD WOULD RUN OUT BEFORE YOU GOT MONEY TO BUY MORE.: NEVER TRUE

## 2023-08-08 SDOH — ECONOMIC STABILITY: HOUSING INSECURITY
IN THE LAST 12 MONTHS, WAS THERE A TIME WHEN YOU DID NOT HAVE A STEADY PLACE TO SLEEP OR SLEPT IN A SHELTER (INCLUDING NOW)?: NO

## 2023-08-08 SDOH — ECONOMIC STABILITY: INCOME INSECURITY: HOW HARD IS IT FOR YOU TO PAY FOR THE VERY BASICS LIKE FOOD, HOUSING, MEDICAL CARE, AND HEATING?: NOT HARD AT ALL

## 2023-08-08 SDOH — ECONOMIC STABILITY: FOOD INSECURITY: WITHIN THE PAST 12 MONTHS, THE FOOD YOU BOUGHT JUST DIDN'T LAST AND YOU DIDN'T HAVE MONEY TO GET MORE.: NEVER TRUE

## 2023-08-08 ASSESSMENT — PATIENT HEALTH QUESTIONNAIRE - PHQ9
SUM OF ALL RESPONSES TO PHQ9 QUESTIONS 1 & 2: 0
SUM OF ALL RESPONSES TO PHQ QUESTIONS 1-9: 0
2. FEELING DOWN, DEPRESSED OR HOPELESS: 0
SUM OF ALL RESPONSES TO PHQ QUESTIONS 1-9: 0
1. LITTLE INTEREST OR PLEASURE IN DOING THINGS: 0

## 2023-08-08 ASSESSMENT — LIFESTYLE VARIABLES
HOW OFTEN DO YOU HAVE A DRINK CONTAINING ALCOHOL: 2-4 TIMES A MONTH
HOW MANY STANDARD DRINKS CONTAINING ALCOHOL DO YOU HAVE ON A TYPICAL DAY: 1 OR 2

## 2023-08-08 NOTE — PROGRESS NOTES
Benjamín Aguayo is a 74 y.o. male (: 1950) presenting to address:    Chief Complaint   Patient presents with    Pre-op Exam     Right hip total replacement . Constipation    Medicare AWV       Vitals:    23 1346   BP: (!) 140/70   Pulse:    Resp:    Temp:    SpO2:        Coordination of Care Questionaire:   1. \"Have you been to the ER, urgent care clinic since your last visit? Hospitalized since your last visit?\"    2. \"Have you seen or consulted any other health care providers outside of the [address] [place] [address] since your last visit? \" Yes ortho      3. For patients aged 43-73: Has the patient had a colonoscopy / FIT/ Cologuard? Yes - no Care Gap present      If the patient is female:    4. For patients aged 43-66: Has the patient had a mammogram within the past 2 years? NA - based on age or sex      11. For patients aged 21-65: Has the patient had a pap smear? NA - based on age or sex    Advanced Directive:   1. Do you have an Advanced Directive? No    2. Would you like information on Advanced Directives?  No
Routine adult health maintenance 11/21/2013    -- Colon cancer:   Colonoscopy done 11/29/2005 at [facility] -- Vaccinations:   Herpes Zoster vaccine given 2012 at [facility]    S/P AAA repair      [facility] following, q3 year duplex of aorta    Seasonal allergic reaction     Sleep apnea     Spinal stenosis in cervical region 3/24/2014    Type 2 diabetes mellitus, controlled ([address])        Past Surgical History:   Procedure Laterality Date    COLONOSCOPY  06/10/2015    Negative study, 5-year follow-up recommended because of family history, Dr. Jacqualyn Babinski    COLONOSCOPY  03/16/2023    Inadequate prep, no specimens-reported history of colon polyps, 3-year follow-up, Dr. Atif Green      left shoulder    SHOULDER ARTHROSCOPY  01/19/2015    Arthroscopy shoulder (Left) Procedure: ARTHROSCOPY SHOULDER ROTATOR CUFF REPAIR;  Surgeon: Ana Ramirez MD;  Location: [facility] OR LOC; Service: Orthopedics;   Laterality: Left;  Left Shoulder Arthroscopy, Subacromial Decompression, bicep tenotomy, debridment, [place]     VASCULAR SURGERY N/A     AAA repair with stent, US's at [facility]       Family History   Problem Relation Age of Onset    Heart Disease Father     Cancer Mother         Rectal, metastasized late 76s    Cancer Maternal Aunt     Cancer Brother         Metastatic, not sure of primary    Cancer Maternal Uncle        Allergies   Allergen Reactions    Simvastatin Myalgia       Social History     Tobacco Use   Smoking Status Former   Smokeless Tobacco Former       Social History     Substance and Sexual Activity   Alcohol Use Yes    Alcohol/week: 0.0 standard drinks       Immunization History   Administered Date(s) Administered    COVID-19, PFIZER PURPLE top, DILUTE for use, (age 15 y+), 30mcg/0.3mL 02/25/2021, 03/18/2021, 10/11/2021    Influenza Trivalent 09/02/2015, 11/15/2017    Influenza Virus Vaccine 09/24/2012, 10/02/2013, 10/08/2014, 10/03/2016    Influenza, FLUAD, (age 72 y+), Adjuvanted,

## 2023-08-21 ENCOUNTER — TELEPHONE (OUTPATIENT)
Dept: FAMILY MEDICINE CLINIC | Facility: CLINIC | Age: 73
End: 2023-08-21

## 2023-08-21 NOTE — TELEPHONE ENCOUNTER
Pt came into the office  in regards to [place] not receiving paperwork in regards to his A1C. Pt stated that he needs the information faxed over so that his surgery can be cancelled. I did reach out to the facility and left vm for them someone to call back.  Please advise

## 2023-08-23 NOTE — TELEPHONE ENCOUNTER
I called and notified patient I have another copy of his note and a1c results to office yesterday . He had someone call from the hospital he believes and he told them Dr Joni Hashimoto didn't recommend surgery due to his uncontrolled diabetes and she was suppose to get back with him but so far has not . I told patient that our office left a message when he first called and to my knowledge they have not called us back , but that I would try and call as well but I cant guarantee that I would get anyone on the phone . Called office left message for them to please call patient to confirm they are aware to cancel his surgery for Friday .

## 2023-08-23 NOTE — TELEPHONE ENCOUNTER
I called patient back he is at the ortho office now and is speaking with Greer Lefort who says they did receive the fax from our office .

## 2023-09-18 ASSESSMENT — ENCOUNTER SYMPTOMS
WHEEZING: 0
ANAL BLEEDING: 0
VOMITING: 0
DIARRHEA: 0
ABDOMINAL PAIN: 0
NAUSEA: 0
BLOOD IN STOOL: 0
CONSTIPATION: 0
CHEST TIGHTNESS: 0
COUGH: 0
SORE THROAT: 0
SHORTNESS OF BREATH: 0

## 2023-09-19 ENCOUNTER — HOSPITAL ENCOUNTER (OUTPATIENT)
Facility: HOSPITAL | Age: 73
Setting detail: SPECIMEN
Discharge: HOME | End: 2023-09-22
Payer: MEDICARE

## 2023-09-19 ENCOUNTER — OFFICE VISIT (OUTPATIENT)
Dept: FAMILY MEDICINE CLINIC | Facility: CLINIC | Age: 73
End: 2023-09-19
Payer: MEDICARE

## 2023-09-19 VITALS
OXYGEN SATURATION: 97 % | WEIGHT: 240 LBS | BODY MASS INDEX: 32.51 KG/M2 | SYSTOLIC BLOOD PRESSURE: 150 MMHG | TEMPERATURE: 97.7 F | HEIGHT: 72 IN | RESPIRATION RATE: 16 BRPM | HEART RATE: 56 BPM | DIASTOLIC BLOOD PRESSURE: 74 MMHG

## 2023-09-19 DIAGNOSIS — Z86.79 S/P AAA REPAIR: ICD-10-CM

## 2023-09-19 DIAGNOSIS — Z01.818 PREOPERATIVE EXAMINATION: Primary | ICD-10-CM

## 2023-09-19 DIAGNOSIS — E11.42 TYPE 2 DIABETES MELLITUS WITH DIABETIC POLYNEUROPATHY, WITHOUT LONG-TERM CURRENT USE OF INSULIN (HCC): ICD-10-CM

## 2023-09-19 DIAGNOSIS — Z98.890 S/P AAA REPAIR: ICD-10-CM

## 2023-09-19 DIAGNOSIS — M87.051 AVASCULAR NECROSIS OF BONE OF RIGHT HIP (HCC): ICD-10-CM

## 2023-09-19 DIAGNOSIS — E78.00 HYPERCHOLESTEROLEMIA: ICD-10-CM

## 2023-09-19 DIAGNOSIS — I10 ESSENTIAL HYPERTENSION: ICD-10-CM

## 2023-09-19 DIAGNOSIS — Z23 NEED FOR PROPHYLACTIC VACCINATION AGAINST STREPTOCOCCUS PNEUMONIAE (PNEUMOCOCCUS) AND INFLUENZA: ICD-10-CM

## 2023-09-19 LAB
ALBUMIN SERPL-MCNC: 3.9 G/DL (ref 3.4–5)
ALBUMIN/GLOB SERPL: 1.3 (ref 0.8–1.7)
ALP SERPL-CCNC: 57 U/L (ref 45–117)
ALT SERPL-CCNC: 21 U/L (ref 16–61)
ANION GAP SERPL CALC-SCNC: 6 MMOL/L (ref 3–18)
APPEARANCE UR: CLEAR
AST SERPL-CCNC: 11 U/L (ref 10–38)
BILIRUB SERPL-MCNC: 0.8 MG/DL (ref 0.2–1)
BILIRUB UR QL: NEGATIVE
BUN SERPL-MCNC: 22 MG/DL (ref 7–18)
BUN/CREAT SERPL: 20 (ref 12–20)
CALCIUM SERPL-MCNC: 9.1 MG/DL (ref 8.5–10.1)
CHLORIDE SERPL-SCNC: 109 MMOL/L (ref 100–111)
CHOLEST SERPL-MCNC: 146 MG/DL
CO2 SERPL-SCNC: 24 MMOL/L (ref 21–32)
COLOR UR: YELLOW
CREAT SERPL-MCNC: 1.1 MG/DL (ref 0.6–1.3)
CREAT UR-MCNC: 83 MG/DL (ref 30–125)
GLOBULIN SER CALC-MCNC: 3.1 G/DL (ref 2–4)
GLUCOSE SERPL-MCNC: 183 MG/DL (ref 74–99)
GLUCOSE UR STRIP.AUTO-MCNC: >1000 MG/DL
HBA1C MFR BLD: 7.5 %
HDLC SERPL-MCNC: 39 MG/DL (ref 40–60)
HDLC SERPL: 3.7 (ref 0–5)
HGB UR QL STRIP: NEGATIVE
KETONES UR QL STRIP.AUTO: NEGATIVE MG/DL
LDLC SERPL CALC-MCNC: 70.4 MG/DL (ref 0–100)
LEUKOCYTE ESTERASE UR QL STRIP.AUTO: NEGATIVE
LIPID PANEL: ABNORMAL
MICROALBUMIN UR-MCNC: 0.97 MG/DL (ref 0–3)
MICROALBUMIN/CREAT UR-RTO: 12 MG/G (ref 0–30)
NITRITE UR QL STRIP.AUTO: NEGATIVE
PH UR STRIP: 5 (ref 5–8)
POTASSIUM SERPL-SCNC: 4.3 MMOL/L (ref 3.5–5.5)
PROT SERPL-MCNC: 7 G/DL (ref 6.4–8.2)
PROT UR STRIP-MCNC: NEGATIVE MG/DL
SODIUM SERPL-SCNC: 139 MMOL/L (ref 136–145)
SP GR UR REFRACTOMETRY: >1.03 (ref 1–1.03)
TRIGL SERPL-MCNC: 183 MG/DL
TSH SERPL DL<=0.05 MIU/L-ACNC: 1.67 UIU/ML (ref 0.36–3.74)
UROBILINOGEN UR QL STRIP.AUTO: 0.2 EU/DL (ref 0.2–1)
VLDLC SERPL CALC-MCNC: 36.6 MG/DL

## 2023-09-19 PROCEDURE — 81003 URINALYSIS AUTO W/O SCOPE: CPT

## 2023-09-19 PROCEDURE — 83036 HEMOGLOBIN GLYCOSYLATED A1C: CPT | Performed by: FAMILY MEDICINE

## 2023-09-19 PROCEDURE — 99213 OFFICE O/P EST LOW 20 MIN: CPT | Performed by: FAMILY MEDICINE

## 2023-09-19 PROCEDURE — 1036F TOBACCO NON-USER: CPT | Performed by: FAMILY MEDICINE

## 2023-09-19 PROCEDURE — 36415 COLL VENOUS BLD VENIPUNCTURE: CPT

## 2023-09-19 PROCEDURE — G0008 ADMIN INFLUENZA VIRUS VAC: HCPCS | Performed by: FAMILY MEDICINE

## 2023-09-19 PROCEDURE — 82043 UR ALBUMIN QUANTITATIVE: CPT

## 2023-09-19 PROCEDURE — 80061 LIPID PANEL: CPT

## 2023-09-19 PROCEDURE — 90694 VACC AIIV4 NO PRSRV 0.5ML IM: CPT | Performed by: FAMILY MEDICINE

## 2023-09-19 PROCEDURE — 3017F COLORECTAL CA SCREEN DOC REV: CPT | Performed by: FAMILY MEDICINE

## 2023-09-19 PROCEDURE — 1123F ACP DISCUSS/DSCN MKR DOCD: CPT | Performed by: FAMILY MEDICINE

## 2023-09-19 PROCEDURE — G8427 DOCREV CUR MEDS BY ELIG CLIN: HCPCS | Performed by: FAMILY MEDICINE

## 2023-09-19 PROCEDURE — 82570 ASSAY OF URINE CREATININE: CPT

## 2023-09-19 PROCEDURE — 80053 COMPREHEN METABOLIC PANEL: CPT

## 2023-09-19 PROCEDURE — 3077F SYST BP >= 140 MM HG: CPT | Performed by: FAMILY MEDICINE

## 2023-09-19 PROCEDURE — 84443 ASSAY THYROID STIM HORMONE: CPT

## 2023-09-19 PROCEDURE — 2022F DILAT RTA XM EVC RTNOPTHY: CPT | Performed by: FAMILY MEDICINE

## 2023-09-19 PROCEDURE — G8417 CALC BMI ABV UP PARAM F/U: HCPCS | Performed by: FAMILY MEDICINE

## 2023-09-19 PROCEDURE — 3046F HEMOGLOBIN A1C LEVEL >9.0%: CPT | Performed by: FAMILY MEDICINE

## 2023-09-19 PROCEDURE — 3078F DIAST BP <80 MM HG: CPT | Performed by: FAMILY MEDICINE

## 2023-09-19 RX ORDER — AMLODIPINE BESYLATE 5 MG/1
5 TABLET ORAL DAILY
Qty: 90 TABLET | Refills: 1 | Status: SHIPPED | OUTPATIENT
Start: 2023-09-19

## 2023-09-19 NOTE — PATIENT INSTRUCTIONS
Current Status:  No medical contraindications to the planned procedure, marked improvement in type 2 diabetes noted with the hemoglobin A1c down from 9.5% 6 weeks ago to 7.5%  Hypertension -not adequately controlled  Status of hyperlipidemia to be determined pending lab results  Denies symptoms of cardiac ischemia, cleared for surgery by cardiology  Continues with urology follow-up following radiation treatment of carcinoma the prostate    Health maintenance recommendations:  Complete and return advance directives form  Influenza immunization today  Diabetic eye exam-every 2 years  Colonoscopy due March 2026  Medicare wellness evaluation due August 2024    Plan:  Medically cleared for surgery  Begin amlodipine 5 mg daily  Lab studies ordered, further disposition pending lab results if indicated  Continue current medications pending lab results increase Jardiance to 25 mg daily  Avoid dietary salt, starch and sugar and as much as possible follow program of regular aerobic exercise.   Return for follow-up in about 1 month, this can be rescheduled if it conflicts with surgery

## 2023-09-22 NOTE — TELEPHONE ENCOUNTER
Patient is requesting a refill on the Shravan Hotemo says when he was in he thought he was suppose to stop this medication but Dr Ale Garcia told him that he should still be taking it .

## 2023-10-09 ENCOUNTER — TELEPHONE (OUTPATIENT)
Dept: FAMILY MEDICINE CLINIC | Facility: CLINIC | Age: 73
End: 2023-10-09

## 2023-10-09 NOTE — TELEPHONE ENCOUNTER
Patient called in regards to his follow up on 10/19 wanting to cancel because [facility] has been checking his BP readings have been 130/70, 125/75 and 130/66 and the new medication is working great . He hasn't been to see his surgeon yet and still using walker/ cane to get around . I told him I would send a message to see if Dr Sharin Dancer would accepting these readings for a follow up on his bp or if still needs to come in .

## 2023-12-22 ENCOUNTER — TELEPHONE (OUTPATIENT)
Dept: FAMILY MEDICINE CLINIC | Facility: CLINIC | Age: 73
End: 2023-12-22

## 2023-12-22 NOTE — TELEPHONE ENCOUNTER
The [facility] pharmacy called advising pt would need a prior auth for linaCLOtide so they are requesting an alt. RX be sent in to get the pt through the weekend.  Message sent to the clinical staff to further assist.

## 2023-12-26 NOTE — TELEPHONE ENCOUNTER
Pt called back to check the status of his request for an alt. RX. Advised I would send a message to the clinical staff to further assist and see if another provider can sign off.

## 2023-12-26 NOTE — TELEPHONE ENCOUNTER
After consulting w/Dr. Lety Pavon; advised pt to increase Miralax and Senna and increase fiber intake; pt inquired about magnesium citrate and was given the clear to take; pt voiced understanding.

## 2023-12-26 NOTE — TELEPHONE ENCOUNTER
Initiated prior auth for Firmafon with [facility]; prior auth denied; inquired about Trulance, denied; informed pt with this information; pt is currently taking OTC Senna, prune juice along with an ABX for a UTI; will discuss w/covering provider as to what else pt can try.

## 2024-02-23 RX ORDER — ATORVASTATIN CALCIUM 80 MG/1
TABLET, FILM COATED ORAL
Qty: 90 TABLET | Refills: 2 | Status: SHIPPED | OUTPATIENT
Start: 2024-02-23

## 2024-04-17 DIAGNOSIS — I10 ESSENTIAL HYPERTENSION: ICD-10-CM

## 2024-04-17 RX ORDER — LINAGLIPTIN 5 MG/1
5 TABLET, FILM COATED ORAL DAILY
Qty: 90 TABLET | Refills: 3 | Status: SHIPPED | OUTPATIENT
Start: 2024-04-17

## 2024-04-17 RX ORDER — AMLODIPINE BESYLATE 5 MG/1
5 TABLET ORAL DAILY
Qty: 90 TABLET | Refills: 3 | Status: SHIPPED | OUTPATIENT
Start: 2024-04-17

## 2024-04-17 RX ORDER — AMLODIPINE BESYLATE 5 MG/1
5 TABLET ORAL DAILY
Qty: 90 TABLET | Refills: 1 | Status: CANCELLED | OUTPATIENT
Start: 2024-04-17

## 2024-04-17 NOTE — TELEPHONE ENCOUNTER
Amlodipine 9/19/23 for 90 with 1 refill. Tradjenta 9/22/23 for 100 with 1 . Last ov 12/22/23 . No future appointment .       
Detail Level: Zone
Plan: Sun protection using hats and clothing should be considered daily. Broad-spectrum mineral sunscreen should be applied to sun-exposed areas daily and reapplied every 80 minutes.

## 2024-04-17 NOTE — TELEPHONE ENCOUNTER
[facility]ed [facility] I have updated the pharmacy to DOD as requested .

## 2024-04-17 NOTE — TELEPHONE ENCOUNTER
Pt called requesting to have refills sent to the [facility] on file; pt advised he is completely out.    Requested Prescriptions     Pending Prescriptions Disp Refills    amLODIPine (NORVASC) 5 MG tablet 90 tablet 1     Sig: Take 1 tablet by mouth daily    linagliptin (TRADJENTA) 5 MG tablet 100 tablet 1     Sig: Take 1 tablet by mouth daily

## 2024-04-23 DIAGNOSIS — I10 ESSENTIAL HYPERTENSION: ICD-10-CM

## 2024-04-23 RX ORDER — AMLODIPINE BESYLATE 5 MG/1
5 TABLET ORAL DAILY
Qty: 90 TABLET | Refills: 3 | Status: SHIPPED | OUTPATIENT
Start: 2024-04-23

## 2024-04-23 NOTE — TELEPHONE ENCOUNTER
I spoke with patient he [facility] to [facility] , but they can't find the prescriptions . He is [facility] .

## 2024-07-09 DIAGNOSIS — I10 ESSENTIAL HYPERTENSION: ICD-10-CM

## 2024-07-09 DIAGNOSIS — E11.42 TYPE 2 DIABETES MELLITUS WITH DIABETIC POLYNEUROPATHY, WITHOUT LONG-TERM CURRENT USE OF INSULIN (HCC): Primary | ICD-10-CM

## 2024-07-09 DIAGNOSIS — E78.00 HYPERCHOLESTEROLEMIA: ICD-10-CM

## 2024-07-09 NOTE — TELEPHONE ENCOUNTER
Requested Prescriptions     Pending Prescriptions Disp Refills    empagliflozin (JARDIANCE) 25 MG tablet [Pharmacy Med Name: JARDIANCE TABS 25MG] 90 tablet 0     Sig: Take 1 tablet by mouth daily     Last seen: 12/22/23  Next seen: None    Last filled: 8/8/23 Qty 90 w/3 refills

## 2024-07-09 NOTE — TELEPHONE ENCOUNTER
Jardiance has been refilled for 90 days however the patient is significantly overdue for follow-up with diabetes and is now due for fasting lab and Medicare wellness evaluation in August.  Please assist him with scheduling the necessary appointments.  Will not be able to continue to refill medications after this without appropriate prior evaluation

## 2024-08-02 ENCOUNTER — TELEPHONE (OUTPATIENT)
Dept: FAMILY MEDICINE CLINIC | Facility: CLINIC | Age: 74
End: 2024-08-02

## 2024-08-02 DIAGNOSIS — E11.42 TYPE 2 DIABETES MELLITUS WITH DIABETIC POLYNEUROPATHY, WITHOUT LONG-TERM CURRENT USE OF INSULIN (HCC): Primary | ICD-10-CM

## 2024-08-02 LAB
ALBUMIN SERPL-MCNC: 4.3 G/DL (ref 3.8–4.8)
ALBUMIN/CREAT UR: 21 MG/G CREAT (ref 0–29)
ALP SERPL-CCNC: 73 IU/L (ref 44–121)
ALT SERPL-CCNC: 11 IU/L (ref 0–44)
APPEARANCE UR: CLEAR
AST SERPL-CCNC: 12 IU/L (ref 0–40)
BACTERIA #/AREA URNS HPF: ABNORMAL /[HPF]
BASOPHILS # BLD AUTO: 0 X10E3/UL (ref 0–0.2)
BASOPHILS NFR BLD AUTO: 1 %
BILIRUB SERPL-MCNC: 0.5 MG/DL (ref 0–1.2)
BILIRUB UR QL STRIP: NEGATIVE
BUN SERPL-MCNC: 19 MG/DL (ref 8–27)
BUN/CREAT SERPL: 19 (ref 10–24)
CALCIUM SERPL-MCNC: 9.3 MG/DL (ref 8.6–10.2)
CASTS URNS QL MICRO: ABNORMAL /LPF
CHLORIDE SERPL-SCNC: 105 MMOL/L (ref 96–106)
CHOLEST SERPL-MCNC: 217 MG/DL (ref 100–199)
CO2 SERPL-SCNC: 21 MMOL/L (ref 20–29)
COLOR UR: YELLOW
CREAT SERPL-MCNC: 1.01 MG/DL (ref 0.76–1.27)
CREAT UR-MCNC: 101.4 MG/DL
EGFRCR SERPLBLD CKD-EPI 2021: 78 ML/MIN/1.73
EOSINOPHIL # BLD AUTO: 0.2 X10E3/UL (ref 0–0.4)
EOSINOPHIL NFR BLD AUTO: 2 %
EPI CELLS #/AREA URNS HPF: ABNORMAL /HPF (ref 0–10)
ERYTHROCYTE [DISTWIDTH] IN BLOOD BY AUTOMATED COUNT: 13.4 % (ref 11.6–15.4)
GLOBULIN SER CALC-MCNC: 2.6 G/DL (ref 1.5–4.5)
GLUCOSE SERPL-MCNC: 161 MG/DL (ref 70–99)
GLUCOSE UR QL STRIP: ABNORMAL
HCT VFR BLD AUTO: 42.4 % (ref 37.5–51)
HDLC SERPL-MCNC: 36 MG/DL
HGB BLD-MCNC: 14.2 G/DL (ref 13–17.7)
HGB UR QL STRIP: NEGATIVE
IMM GRANULOCYTES # BLD AUTO: 0 X10E3/UL (ref 0–0.1)
IMM GRANULOCYTES NFR BLD AUTO: 0 %
KETONES UR QL STRIP: NEGATIVE
LDLC SERPL CALC-MCNC: 137 MG/DL (ref 0–99)
LEUKOCYTE ESTERASE UR QL STRIP: ABNORMAL
LYMPHOCYTES # BLD AUTO: 1.5 X10E3/UL (ref 0.7–3.1)
LYMPHOCYTES NFR BLD AUTO: 22 %
MCH RBC QN AUTO: 30.4 PG (ref 26.6–33)
MCHC RBC AUTO-ENTMCNC: 33.5 G/DL (ref 31.5–35.7)
MCV RBC AUTO: 91 FL (ref 79–97)
MICRO URNS: ABNORMAL
MICROALBUMIN UR-MCNC: 20.8 UG/ML
MONOCYTES # BLD AUTO: 0.6 X10E3/UL (ref 0.1–0.9)
MONOCYTES NFR BLD AUTO: 9 %
NEUTROPHILS # BLD AUTO: 4.4 X10E3/UL (ref 1.4–7)
NEUTROPHILS NFR BLD AUTO: 66 %
NITRITE UR QL STRIP: POSITIVE
PH UR STRIP: 5 [PH] (ref 5–7.5)
PLATELET # BLD AUTO: 214 X10E3/UL (ref 150–450)
POTASSIUM SERPL-SCNC: 4.5 MMOL/L (ref 3.5–5.2)
PROT SERPL-MCNC: 6.9 G/DL (ref 6–8.5)
PROT UR QL STRIP: NEGATIVE
RBC # BLD AUTO: 4.67 X10E6/UL (ref 4.14–5.8)
RBC #/AREA URNS HPF: ABNORMAL /HPF (ref 0–2)
SODIUM SERPL-SCNC: 141 MMOL/L (ref 134–144)
SP GR UR STRIP: >=1.03 (ref 1–1.03)
SPECIMEN STATUS REPORT: NORMAL
TRIGL SERPL-MCNC: 246 MG/DL (ref 0–149)
TSH SERPL DL<=0.005 MIU/L-ACNC: 2.69 UIU/ML (ref 0.45–4.5)
UROBILINOGEN UR STRIP-MCNC: 0.2 MG/DL (ref 0.2–1)
VLDLC SERPL CALC-MCNC: 44 MG/DL (ref 5–40)
WBC # BLD AUTO: 6.7 X10E3/UL (ref 3.4–10.8)
WBC #/AREA URNS HPF: >30 /HPF (ref 0–5)

## 2024-08-03 LAB — HBA1C MFR BLD: 8.8 % (ref 4.8–5.6)

## 2024-08-05 ENCOUNTER — HOSPITAL ENCOUNTER (OUTPATIENT)
Facility: HOSPITAL | Age: 74
Setting detail: SPECIMEN
Discharge: HOME | End: 2024-08-08
Payer: MEDICARE

## 2024-08-05 ENCOUNTER — OFFICE VISIT (OUTPATIENT)
Dept: FAMILY MEDICINE CLINIC | Facility: CLINIC | Age: 74
End: 2024-08-05

## 2024-08-05 VITALS
DIASTOLIC BLOOD PRESSURE: 70 MMHG | OXYGEN SATURATION: 96 % | HEIGHT: 72 IN | RESPIRATION RATE: 16 BRPM | TEMPERATURE: 97.4 F | BODY MASS INDEX: 32.78 KG/M2 | WEIGHT: 242 LBS | SYSTOLIC BLOOD PRESSURE: 136 MMHG | HEART RATE: 47 BPM

## 2024-08-05 DIAGNOSIS — R82.90 ABNORMAL URINALYSIS: ICD-10-CM

## 2024-08-05 DIAGNOSIS — Z85.46 HISTORY OF PROSTATE CANCER: ICD-10-CM

## 2024-08-05 DIAGNOSIS — I25.10 CORONARY ARTERY DISEASE INVOLVING NATIVE CORONARY ARTERY OF NATIVE HEART WITHOUT ANGINA PECTORIS: ICD-10-CM

## 2024-08-05 DIAGNOSIS — Z00.00 MEDICARE ANNUAL WELLNESS VISIT, SUBSEQUENT: ICD-10-CM

## 2024-08-05 DIAGNOSIS — E11.42 TYPE 2 DIABETES MELLITUS WITH DIABETIC POLYNEUROPATHY, WITHOUT LONG-TERM CURRENT USE OF INSULIN (HCC): Primary | ICD-10-CM

## 2024-08-05 DIAGNOSIS — E78.00 HYPERCHOLESTEROLEMIA: ICD-10-CM

## 2024-08-05 DIAGNOSIS — Z86.79 S/P AAA REPAIR: ICD-10-CM

## 2024-08-05 DIAGNOSIS — Z98.890 S/P AAA REPAIR: ICD-10-CM

## 2024-08-05 DIAGNOSIS — I10 ESSENTIAL HYPERTENSION: ICD-10-CM

## 2024-08-05 PROCEDURE — 87086 URINE CULTURE/COLONY COUNT: CPT

## 2024-08-05 PROCEDURE — 87088 URINE BACTERIA CULTURE: CPT

## 2024-08-05 PROCEDURE — 87186 SC STD MICRODIL/AGAR DIL: CPT

## 2024-08-05 RX ORDER — AMOXICILLIN 500 MG
CAPSULE ORAL
COMMUNITY

## 2024-08-05 ASSESSMENT — ENCOUNTER SYMPTOMS
SINUS PAIN: 1
BACK PAIN: 1
CONSTIPATION: 1

## 2024-08-05 ASSESSMENT — PATIENT HEALTH QUESTIONNAIRE - PHQ9
SUM OF ALL RESPONSES TO PHQ QUESTIONS 1-9: 0
SUM OF ALL RESPONSES TO PHQ9 QUESTIONS 1 & 2: 0
SUM OF ALL RESPONSES TO PHQ QUESTIONS 1-9: 0
SUM OF ALL RESPONSES TO PHQ QUESTIONS 1-9: 0
2. FEELING DOWN, DEPRESSED OR HOPELESS: NOT AT ALL
SUM OF ALL RESPONSES TO PHQ QUESTIONS 1-9: 0
1. LITTLE INTEREST OR PLEASURE IN DOING THINGS: NOT AT ALL

## 2024-08-05 ASSESSMENT — LIFESTYLE VARIABLES
HOW OFTEN DO YOU HAVE A DRINK CONTAINING ALCOHOL: MONTHLY OR LESS
HOW MANY STANDARD DRINKS CONTAINING ALCOHOL DO YOU HAVE ON A TYPICAL DAY: 1 OR 2

## 2024-08-05 NOTE — PATIENT INSTRUCTIONS
your overall health will be.  What kinds of activity can help you stay healthy?  Being more active will make your daily activities easier. Physical activity includes planned exercise and things you do in daily life. There are four types of activity:  Aerobic.  Doing aerobic activity makes your heart and lungs strong.  Includes walking, dancing, and gardening.  Aim for at least 2½ hours spread throughout the week.  It improves your energy and can help you sleep better.  Muscle-strengthening.  This type of activity can help maintain muscle and strengthen bones.  Includes climbing stairs, using resistance bands, and lifting or carrying heavy loads.  Aim for at least twice a week.  It can help protect the knees and other joints.  Stretching.  Stretching gives you better range of motion in joints and muscles.  Includes upper arm stretches, calf stretches, and gentle yoga.  Aim for at least twice a week, preferably after your muscles are warmed up from other activities.  It can help you function better in daily life.  Balancing.  This helps you stay coordinated and have good posture.  Includes heel-to-toe walking, deborah chi, and certain types of yoga.  Aim for at least 3 days a week.  It can reduce your risk of falling.  Even if you have a hard time meeting the recommendations, it's better to be more active than less active. All activity done in each category counts toward your weekly total. You'd be surprised how daily things like carrying groceries, keeping up with grandchildren, and taking the stairs can add up.  What keeps you from being active?  If you've had a hard time being more active, you're not alone. Maybe you remember being able to do more. Or maybe you've never thought of yourself as being active. It's frustrating when you can't do the things you want. Being more active can help. What's holding you back?  Getting started.  Have a goal, but break it into easy tasks. Small steps build into big

## 2024-08-05 NOTE — PROGRESS NOTES
Rm Carbajal is a 73 y.o. male (: 1950) presenting to address:    Chief Complaint   Patient presents with    Medicare AWV       Vitals:    24 0737   BP: 136/70   Pulse: (!) 47   Resp: 16   Temp: 97.4 °F (36.3 °C)   SpO2: 96%       \"Have you been to the ER, urgent care clinic since your last visit?  Hospitalized since your last visit?\"    NO    “Have you seen or consulted any other health care providers outside of [facility] since your last visit?”    Yes urology            
made and/or referrals ordered.    Positive Risk Factor Screenings with Interventions:       Cognitive:   Clock Drawing Test (CDT): (!) Abnormal  Words recalled: 2 Words Recalled  Total Score: (!) 2  Total Score Interpretation: Abnormal Mini-Cog  Interventions:  See AVS for additional education material            Inactivity:  On average, how many days per week do you engage in moderate to strenuous exercise (like a brisk walk)?: 0 days (!) Abnormal  On average, how many minutes do you engage in exercise at this level?: 0 min  Interventions:  See AVS for additional education material    Poor Eating Habits/Diet:  Do you eat balanced/healthy meals regularly?: (!) No  Interventions:  low carbohydrate diet    Abnormal BMI (obese):  Body mass index is 32.82 kg/m². (!) Abnormal  Interventions:  low carbohydrate diet          Vision Screen:  Do you have difficulty driving, watching TV, or doing any of your daily activities because of your eyesight?: No  Have you had an eye exam within the past year?: (!) No  Interventions:   Patient encouraged to make appointment with their eye specialist    Safety:  Do you have non-slip mats or non-slip surfaces or shower bars or grab bars in your shower or bathtub?: (!) No  Interventions:  See AVS for additional education material     Advanced Directives:  Do you have a Living Will?: (!) No  Intervention:  has NO advanced directive - information provided                 Objective   Vitals:    08/05/24 0737   BP: 136/70   Pulse: (!) 47   Resp: 16   Temp: 97.4 °F (36.3 °C)   TempSrc: Temporal   SpO2: 96%   Weight: 109.8 kg (242 lb)   Height: 1.829 m (6')      Body mass index is 32.82 kg/m².                    Allergies   Allergen Reactions    Simvastatin Myalgia     Prior to Visit Medications    Medication Sig Taking? Authorizing Provider   Omega-3 Fatty Acids (FISH OIL) 1200 MG CAPS Take by mouth Yes Provider, MD Lalo   empagliflozin (JARDIANCE) 25 MG tablet Take 1 tablet by mouth

## 2024-08-06 LAB — SPECIMEN STATUS REPORT: NORMAL

## 2024-08-08 DIAGNOSIS — Z87.440 HISTORY OF UTI: ICD-10-CM

## 2024-08-08 DIAGNOSIS — N39.0 E. COLI URINARY TRACT INFECTION: ICD-10-CM

## 2024-08-08 DIAGNOSIS — N39.0 E. COLI URINARY TRACT INFECTION: Primary | ICD-10-CM

## 2024-08-08 DIAGNOSIS — B96.20 E. COLI URINARY TRACT INFECTION: ICD-10-CM

## 2024-08-08 DIAGNOSIS — B96.20 E. COLI URINARY TRACT INFECTION: Primary | ICD-10-CM

## 2024-08-08 LAB
BACTERIA SPEC CULT: ABNORMAL
CC UR VC: ABNORMAL
SERVICE CMNT-IMP: ABNORMAL

## 2024-08-08 RX ORDER — NITROFURANTOIN 25; 75 MG/1; MG/1
100 CAPSULE ORAL 2 TIMES DAILY
Qty: 10 CAPSULE | Refills: 0 | Status: SHIPPED | OUTPATIENT
Start: 2024-08-08 | End: 2024-08-08 | Stop reason: SDUPTHER

## 2024-08-08 RX ORDER — NITROFURANTOIN 25; 75 MG/1; MG/1
100 CAPSULE ORAL 2 TIMES DAILY
Qty: 10 CAPSULE | Refills: 0 | Status: SHIPPED | OUTPATIENT
Start: 2024-08-08 | End: 2024-08-13

## 2024-08-08 NOTE — TELEPHONE ENCOUNTER
Patient notified of lab results and is requesting medication to be sent to [facility], so he can pick it up today .

## 2024-08-29 ENCOUNTER — LAB (OUTPATIENT)
Dept: FAMILY MEDICINE CLINIC | Facility: CLINIC | Age: 74
End: 2024-08-29

## 2024-08-29 ENCOUNTER — HOSPITAL ENCOUNTER (OUTPATIENT)
Facility: HOSPITAL | Age: 74
Setting detail: SPECIMEN
Discharge: HOME | End: 2024-08-29
Payer: MEDICARE

## 2024-08-29 DIAGNOSIS — B96.20 E. COLI URINARY TRACT INFECTION: ICD-10-CM

## 2024-08-29 DIAGNOSIS — N39.0 E. COLI URINARY TRACT INFECTION: ICD-10-CM

## 2024-08-29 DIAGNOSIS — Z87.440 HISTORY OF UTI: ICD-10-CM

## 2024-08-29 PROCEDURE — 87186 SC STD MICRODIL/AGAR DIL: CPT

## 2024-08-29 PROCEDURE — 87086 URINE CULTURE/COLONY COUNT: CPT

## 2024-08-29 PROCEDURE — 87088 URINE BACTERIA CULTURE: CPT

## 2024-08-31 LAB
BACTERIA SPEC CULT: ABNORMAL
CC UR VC: ABNORMAL
SERVICE CMNT-IMP: ABNORMAL

## 2024-09-02 DIAGNOSIS — B96.20 E. COLI URINARY TRACT INFECTION: Primary | ICD-10-CM

## 2024-09-02 DIAGNOSIS — N39.0 E. COLI URINARY TRACT INFECTION: Primary | ICD-10-CM

## 2024-09-02 RX ORDER — CIPROFLOXACIN 500 MG/1
500 TABLET, FILM COATED ORAL 2 TIMES DAILY
Qty: 14 TABLET | Refills: 0 | Status: SHIPPED | OUTPATIENT
Start: 2024-09-02 | End: 2024-09-04 | Stop reason: SDUPTHER

## 2024-09-04 ENCOUNTER — TELEPHONE (OUTPATIENT)
Dept: FAMILY MEDICINE CLINIC | Facility: CLINIC | Age: 74
End: 2024-09-04

## 2024-09-04 DIAGNOSIS — B96.20 E. COLI URINARY TRACT INFECTION: Primary | ICD-10-CM

## 2024-09-04 DIAGNOSIS — N39.0 E. COLI URINARY TRACT INFECTION: Primary | ICD-10-CM

## 2024-09-04 RX ORDER — CIPROFLOXACIN 500 MG/1
500 TABLET, FILM COATED ORAL 2 TIMES DAILY
Qty: 14 TABLET | Refills: 0 | Status: SHIPPED | OUTPATIENT
Start: 2024-09-04 | End: 2024-09-11

## 2024-09-04 NOTE — TELEPHONE ENCOUNTER
PT called requesting his medication for the Cipro be sent to [place] [facility] instead of [place]. Please advise.

## 2024-09-16 ENCOUNTER — TELEPHONE (OUTPATIENT)
Dept: FAMILY MEDICINE CLINIC | Facility: CLINIC | Age: 74
End: 2024-09-16

## 2024-09-24 ENCOUNTER — HOSPITAL ENCOUNTER (OUTPATIENT)
Facility: HOSPITAL | Age: 74
Setting detail: SPECIMEN
Discharge: HOME | End: 2024-09-27
Payer: MEDICARE

## 2024-09-24 ENCOUNTER — OFFICE VISIT (OUTPATIENT)
Dept: FAMILY MEDICINE CLINIC | Facility: CLINIC | Age: 74
End: 2024-09-24

## 2024-09-24 DIAGNOSIS — N39.0 E. COLI URINARY TRACT INFECTION: Primary | ICD-10-CM

## 2024-09-24 DIAGNOSIS — B96.20 E. COLI URINARY TRACT INFECTION: Primary | ICD-10-CM

## 2024-09-24 PROCEDURE — 87086 URINE CULTURE/COLONY COUNT: CPT

## 2024-09-25 LAB
BACTERIA SPEC CULT: NORMAL
CC UR VC: NORMAL
SERVICE CMNT-IMP: NORMAL

## 2024-10-12 DIAGNOSIS — I10 ESSENTIAL HYPERTENSION: ICD-10-CM

## 2024-10-12 DIAGNOSIS — E11.42 TYPE 2 DIABETES MELLITUS WITH DIABETIC POLYNEUROPATHY, WITHOUT LONG-TERM CURRENT USE OF INSULIN (HCC): ICD-10-CM

## 2024-10-14 RX ORDER — TELMISARTAN 80 MG/1
80 TABLET ORAL DAILY
Qty: 90 TABLET | Refills: 0 | Status: SHIPPED | OUTPATIENT
Start: 2024-10-14

## 2024-10-14 NOTE — TELEPHONE ENCOUNTER
Last refilled 8/8/23 for 90 with 3 refills. Last ov 8/5/24   Future Appointments   Date Time Provider Department Center   10/29/2024  8:00 AM LAB_BSMA [facility] DEP   11/5/2024  8:00 AM [facility]   11/26/2024  9:10 AM [facility] [place] Sched   12/3/2024  8:40 AM Natalia Talbot PA-C [facility] [place] Sched

## 2025-01-01 DIAGNOSIS — I10 ESSENTIAL HYPERTENSION: ICD-10-CM

## 2025-01-02 RX ORDER — TELMISARTAN 80 MG/1
80 TABLET ORAL DAILY
Qty: 90 TABLET | Refills: 0 | Status: SHIPPED | OUTPATIENT
Start: 2025-01-02

## 2025-01-02 NOTE — TELEPHONE ENCOUNTER
The requested medication has been refilled for 90 days.  He was asked to return for a lab appointment followed by an office evaluation appointment in November but canceled the appointment.  Please assist him with rescheduling.  No additional refills can be provided after this without appropriate prior evaluation

## 2025-01-06 DIAGNOSIS — E11.42 TYPE 2 DIABETES MELLITUS WITH DIABETIC POLYNEUROPATHY, WITHOUT LONG-TERM CURRENT USE OF INSULIN (HCC): ICD-10-CM

## 2025-01-06 RX ORDER — EMPAGLIFLOZIN 25 MG/1
25 TABLET, FILM COATED ORAL DAILY
Qty: 90 TABLET | Refills: 0 | Status: SHIPPED | OUTPATIENT
Start: 2025-01-06

## 2025-01-09 ENCOUNTER — HOSPITAL ENCOUNTER (OUTPATIENT)
Facility: HOSPITAL | Age: 75
Setting detail: SPECIMEN
Discharge: HOME | End: 2025-01-12
Payer: MEDICARE

## 2025-01-09 DIAGNOSIS — E11.42 TYPE 2 DIABETES MELLITUS WITH DIABETIC POLYNEUROPATHY, WITHOUT LONG-TERM CURRENT USE OF INSULIN (HCC): ICD-10-CM

## 2025-01-09 DIAGNOSIS — E78.00 HYPERCHOLESTEROLEMIA: ICD-10-CM

## 2025-01-09 LAB
CHOLEST SERPL-MCNC: 189 MG/DL
EST. AVERAGE GLUCOSE BLD GHB EST-MCNC: 177 MG/DL
HBA1C MFR BLD: 7.8 % (ref 4.2–5.6)
HDLC SERPL-MCNC: 46 MG/DL (ref 40–60)
HDLC SERPL: 4.1 (ref 0–5)
LDLC SERPL CALC-MCNC: 104.6 MG/DL (ref 0–100)
LIPID PANEL: ABNORMAL
TRIGL SERPL-MCNC: 192 MG/DL
VLDLC SERPL CALC-MCNC: 38.4 MG/DL

## 2025-01-09 PROCEDURE — 87086 URINE CULTURE/COLONY COUNT: CPT

## 2025-01-09 PROCEDURE — 83036 HEMOGLOBIN GLYCOSYLATED A1C: CPT

## 2025-01-09 PROCEDURE — 80061 LIPID PANEL: CPT

## 2025-01-09 PROCEDURE — 36415 COLL VENOUS BLD VENIPUNCTURE: CPT

## 2025-01-10 LAB
BACTERIA SPEC CULT: NORMAL
SERVICE CMNT-IMP: NORMAL

## 2025-01-15 PROBLEM — Z85.46 HISTORY OF PROSTATE CANCER: Status: ACTIVE | Noted: 2025-01-15

## 2025-01-16 ENCOUNTER — OFFICE VISIT (OUTPATIENT)
Dept: FAMILY MEDICINE CLINIC | Facility: CLINIC | Age: 75
End: 2025-01-16
Payer: MEDICARE

## 2025-01-16 VITALS
DIASTOLIC BLOOD PRESSURE: 80 MMHG | TEMPERATURE: 98.2 F | OXYGEN SATURATION: 97 % | SYSTOLIC BLOOD PRESSURE: 130 MMHG | BODY MASS INDEX: 33.05 KG/M2 | WEIGHT: 244 LBS | HEART RATE: 52 BPM | RESPIRATION RATE: 16 BRPM | HEIGHT: 72 IN

## 2025-01-16 DIAGNOSIS — E11.42 TYPE 2 DIABETES MELLITUS WITH DIABETIC POLYNEUROPATHY, WITHOUT LONG-TERM CURRENT USE OF INSULIN (HCC): Primary | ICD-10-CM

## 2025-01-16 DIAGNOSIS — Z85.46 HISTORY OF PROSTATE CANCER: ICD-10-CM

## 2025-01-16 DIAGNOSIS — I25.10 CORONARY ARTERY DISEASE INVOLVING NATIVE CORONARY ARTERY OF NATIVE HEART WITHOUT ANGINA PECTORIS: ICD-10-CM

## 2025-01-16 DIAGNOSIS — I10 ESSENTIAL HYPERTENSION: ICD-10-CM

## 2025-01-16 DIAGNOSIS — E78.00 HYPERCHOLESTEROLEMIA: ICD-10-CM

## 2025-01-16 PROCEDURE — 3079F DIAST BP 80-89 MM HG: CPT | Performed by: FAMILY MEDICINE

## 2025-01-16 PROCEDURE — 3075F SYST BP GE 130 - 139MM HG: CPT | Performed by: FAMILY MEDICINE

## 2025-01-16 PROCEDURE — 1123F ACP DISCUSS/DSCN MKR DOCD: CPT | Performed by: FAMILY MEDICINE

## 2025-01-16 PROCEDURE — G8417 CALC BMI ABV UP PARAM F/U: HCPCS | Performed by: FAMILY MEDICINE

## 2025-01-16 PROCEDURE — 1159F MED LIST DOCD IN RCRD: CPT | Performed by: FAMILY MEDICINE

## 2025-01-16 PROCEDURE — G8427 DOCREV CUR MEDS BY ELIG CLIN: HCPCS | Performed by: FAMILY MEDICINE

## 2025-01-16 PROCEDURE — 99214 OFFICE O/P EST MOD 30 MIN: CPT | Performed by: FAMILY MEDICINE

## 2025-01-16 PROCEDURE — 1036F TOBACCO NON-USER: CPT | Performed by: FAMILY MEDICINE

## 2025-01-16 PROCEDURE — 3051F HG A1C>EQUAL 7.0%<8.0%: CPT | Performed by: FAMILY MEDICINE

## 2025-01-16 PROCEDURE — 1126F AMNT PAIN NOTED NONE PRSNT: CPT | Performed by: FAMILY MEDICINE

## 2025-01-16 PROCEDURE — 1160F RVW MEDS BY RX/DR IN RCRD: CPT | Performed by: FAMILY MEDICINE

## 2025-01-16 RX ORDER — EZETIMIBE 10 MG/1
10 TABLET ORAL DAILY
Qty: 90 TABLET | Refills: 3 | Status: SHIPPED | OUTPATIENT
Start: 2025-01-16

## 2025-01-16 RX ORDER — BLOOD-GLUCOSE METER
KIT MISCELLANEOUS
Qty: 100 EACH | Refills: 3 | Status: SHIPPED | OUTPATIENT
Start: 2025-01-16

## 2025-01-16 SDOH — ECONOMIC STABILITY: FOOD INSECURITY: WITHIN THE PAST 12 MONTHS, YOU WORRIED THAT YOUR FOOD WOULD RUN OUT BEFORE YOU GOT MONEY TO BUY MORE.: NEVER TRUE

## 2025-01-16 SDOH — ECONOMIC STABILITY: FOOD INSECURITY: WITHIN THE PAST 12 MONTHS, THE FOOD YOU BOUGHT JUST DIDN'T LAST AND YOU DIDN'T HAVE MONEY TO GET MORE.: NEVER TRUE

## 2025-01-16 ASSESSMENT — PATIENT HEALTH QUESTIONNAIRE - PHQ9
1. LITTLE INTEREST OR PLEASURE IN DOING THINGS: NOT AT ALL
SUM OF ALL RESPONSES TO PHQ QUESTIONS 1-9: 0
2. FEELING DOWN, DEPRESSED OR HOPELESS: NOT AT ALL
SUM OF ALL RESPONSES TO PHQ9 QUESTIONS 1 & 2: 0
SUM OF ALL RESPONSES TO PHQ QUESTIONS 1-9: 0

## 2025-01-16 NOTE — PROGRESS NOTES
Rm Carbajal is a 74 y.o. male (: 1950) presenting to address:    Chief Complaint   Patient presents with    Diabetes    Immunizations     Had flu and covid last month       Vitals:    25 0835   BP: 130/80   Pulse: 52   Resp: 16   Temp: 98.2 °F (36.8 °C)   SpO2: 97%       \"Have you been to the ER, urgent care clinic since your last visit?  Hospitalized since your last visit?\"    NO    “Have you seen or consulted any other health care providers outside of [facility] since your last visit?”    NO             
that following radiation therapy treatment 2 years ago    Health Maintenance Recommendations:  Influenza immunization-completed  COVID-19 immunization booster and RSV immunization available at the pharmacy    Plan:  Continue current medications and add ezetimibe 10 mg daily for better control of hyperlipidemia  Increase effort at avoiding dietary starch and sugar  Please schedule a lab appointment followed by a Medicare wellness evaluation appointment in August, return sooner with any problems  Please always arrive at least 15 minutes before your scheduled appointment time.    Coronary artery disease added back to problem list because of documentation that cardiac catheterization in February 2018 showed mild-moderate stenosis of the ostia of the small circumflex  Raymundo Castillo MD    Please Note:  This document has been produced using voice recognition software.  Unrecognized errors in transcription may be present.

## 2025-01-16 NOTE — PATIENT INSTRUCTIONS
Current Status:  Type 2 diabetes with control improved but still suboptimal, hemoglobin A1c now 7.8% down from 8.8% in August  Lipid levels with LDL cholesterol above goal at 104.6 but improved from 137 in August  No symptoms of cardiac ischemia  History of carcinoma of the prostate followed by urology with most recent PSA 0.13 in November compared with 0.186 months prior to that following radiation therapy treatment 2 years ago    Health Maintenance Recommendations:  Influenza immunization-completed  COVID-19 immunization booster and RSV immunization available at the pharmacy    Plan:  Continue current medications and add ezetimibe 10 mg daily for better control of hyperlipidemia  Increase effort at avoiding dietary starch and sugar  Please schedule a lab appointment followed by a Medicare wellness evaluation appointment in August, return sooner with any problems  Please always arrive at least 15 minutes before your scheduled appointment time.

## 2025-03-25 RX ORDER — ATORVASTATIN CALCIUM 80 MG/1
80 TABLET, FILM COATED ORAL DAILY
Qty: 90 TABLET | Refills: 1 | Status: SHIPPED | OUTPATIENT
Start: 2025-03-25

## 2025-03-25 NOTE — TELEPHONE ENCOUNTER
Last refilled 2/23/24 for 90 with 2 refills.last ov 1/16/25   Future Appointments   Date Time Provider Department Center   7/30/2025  9:10 AM LAB_BSMA [facility] DEP   8/6/2025  9:00 AM Raymundo Castillo MD [facility] DEP   11/18/2025  8:50 AM [facility] YANET TECH NURSE [facility] [place] Sched   11/25/2025  8:40 AM Natalia Talbot PA-C [facility] [place] Sched

## 2025-05-12 DIAGNOSIS — E78.00 HYPERCHOLESTEROLEMIA: ICD-10-CM

## 2025-05-12 RX ORDER — EZETIMIBE 10 MG/1
10 TABLET ORAL DAILY
Qty: 90 TABLET | Refills: 3 | Status: SHIPPED | OUTPATIENT
Start: 2025-05-12

## 2025-05-12 NOTE — TELEPHONE ENCOUNTER
Last refilled 1/16/25 for 90 with 3 refills. Last ov 1/16/25   Future Appointments   Date Time Provider Department Center   7/30/2025  9:10 AM LAB_[facility]MA [facility] DEP   8/6/2025  9:00 [facility]   11/18/2025  8:50 AM [facility] YANET TECH NURSE [facility] [place] Sched   11/25/2025  8:40 AM Natalia Talbot PA-C [facility] [place] Sched

## 2025-06-10 DIAGNOSIS — E11.42 TYPE 2 DIABETES MELLITUS WITH DIABETIC POLYNEUROPATHY, WITHOUT LONG-TERM CURRENT USE OF INSULIN (HCC): ICD-10-CM

## 2025-06-10 DIAGNOSIS — I10 ESSENTIAL HYPERTENSION: ICD-10-CM

## 2025-06-10 RX ORDER — TELMISARTAN 80 MG/1
80 TABLET ORAL DAILY
Qty: 90 TABLET | Refills: 2 | Status: SHIPPED | OUTPATIENT
Start: 2025-06-10

## 2025-06-10 NOTE — TELEPHONE ENCOUNTER
Last refill 01/02/2025 and  01/06/2025. Last OV 01/16/2025   Future Appointments   Date Time Provider Department Center   7/30/2025  9:10 AM LAB_[facility]MA [facility] DEP   8/6/2025  9:00 AM Raymundo Castillo MD [facility] DEP   11/18/2025  8:50 AM [facility] TECH NURSE [facility] [place] Sched   11/25/2025  8:40 AM Natalia Talbot PA-C [facility] [place] Sched

## 2025-07-27 DIAGNOSIS — E78.00 HYPERCHOLESTEROLEMIA: ICD-10-CM

## 2025-07-27 DIAGNOSIS — E11.42 TYPE 2 DIABETES MELLITUS WITH DIABETIC POLYNEUROPATHY, WITHOUT LONG-TERM CURRENT USE OF INSULIN (HCC): ICD-10-CM

## 2025-07-27 DIAGNOSIS — I10 ESSENTIAL HYPERTENSION: Primary | ICD-10-CM

## 2025-07-30 ENCOUNTER — HOSPITAL ENCOUNTER (OUTPATIENT)
Facility: HOSPITAL | Age: 75
Setting detail: SPECIMEN
Discharge: HOME | End: 2025-08-02
Payer: MEDICARE

## 2025-07-30 DIAGNOSIS — E78.00 HYPERCHOLESTEROLEMIA: ICD-10-CM

## 2025-07-30 DIAGNOSIS — E11.42 TYPE 2 DIABETES MELLITUS WITH DIABETIC POLYNEUROPATHY, WITHOUT LONG-TERM CURRENT USE OF INSULIN (HCC): ICD-10-CM

## 2025-07-30 DIAGNOSIS — I10 ESSENTIAL HYPERTENSION: ICD-10-CM

## 2025-07-30 LAB
ALBUMIN SERPL-MCNC: 4.1 G/DL (ref 3.4–5)
ALBUMIN/GLOB SERPL: 1.4 (ref 0.8–1.7)
ALP SERPL-CCNC: 69 U/L (ref 45–117)
ALT SERPL-CCNC: 16 U/L (ref 10–50)
ANION GAP SERPL CALC-SCNC: 10 MMOL/L (ref 3–18)
APPEARANCE UR: CLEAR
AST SERPL-CCNC: 16 U/L (ref 10–38)
BASOPHILS # BLD: 0.04 K/UL (ref 0–0.1)
BASOPHILS NFR BLD: 0.4 % (ref 0–2)
BILIRUB SERPL-MCNC: 0.6 MG/DL (ref 0.2–1)
BILIRUB UR QL: NEGATIVE
BUN SERPL-MCNC: 20 MG/DL (ref 6–23)
BUN/CREAT SERPL: 18 (ref 12–20)
CALCIUM SERPL-MCNC: 9.6 MG/DL (ref 8.5–10.1)
CHLORIDE SERPL-SCNC: 106 MMOL/L (ref 98–107)
CHOLEST SERPL-MCNC: 137 MG/DL
CO2 SERPL-SCNC: 23 MMOL/L (ref 21–32)
COLOR UR: YELLOW
CREAT SERPL-MCNC: 1.1 MG/DL (ref 0.6–1.3)
CREAT UR-MCNC: 95.6 MG/DL (ref 30–125)
DIFFERENTIAL METHOD BLD: ABNORMAL
EOSINOPHIL # BLD: 0.09 K/UL (ref 0–0.4)
EOSINOPHIL NFR BLD: 1 % (ref 0–5)
ERYTHROCYTE [DISTWIDTH] IN BLOOD BY AUTOMATED COUNT: 13.5 % (ref 11.6–14.5)
EST. AVERAGE GLUCOSE BLD GHB EST-MCNC: 211 MG/DL
GLOBULIN SER CALC-MCNC: 2.9 G/DL (ref 2–4)
GLUCOSE SERPL-MCNC: 205 MG/DL (ref 74–108)
GLUCOSE UR STRIP.AUTO-MCNC: >1000 MG/DL
HBA1C MFR BLD: 9 % (ref 4.2–5.6)
HCT VFR BLD AUTO: 44.1 % (ref 36–48)
HDLC SERPL-MCNC: 38 MG/DL (ref 40–60)
HDLC SERPL: 3.6 (ref 0–5)
HGB BLD-MCNC: 14.5 G/DL (ref 13–16)
HGB UR QL STRIP: NEGATIVE
IMM GRANULOCYTES # BLD AUTO: 0.05 K/UL (ref 0–0.04)
IMM GRANULOCYTES NFR BLD AUTO: 0.5 % (ref 0–0.5)
KETONES UR QL STRIP.AUTO: NEGATIVE MG/DL
LDLC SERPL CALC-MCNC: 66 MG/DL (ref 0–100)
LEUKOCYTE ESTERASE UR QL STRIP.AUTO: NEGATIVE
LYMPHOCYTES # BLD: 1.22 K/UL (ref 0.9–3.6)
LYMPHOCYTES NFR BLD: 13.4 % (ref 21–52)
MCH RBC QN AUTO: 30.9 PG (ref 24–34)
MCHC RBC AUTO-ENTMCNC: 32.9 G/DL (ref 31–37)
MCV RBC AUTO: 94 FL (ref 78–100)
MICROALBUMIN UR-MCNC: <1.2 MG/DL (ref 0–3)
MICROALBUMIN/CREAT UR-RTO: NORMAL MG/G (ref 0–30)
MONOCYTES # BLD: 0.62 K/UL (ref 0.05–1.2)
MONOCYTES NFR BLD: 6.8 % (ref 3–10)
NEUTS SEG # BLD: 7.08 K/UL (ref 1.8–8)
NEUTS SEG NFR BLD: 77.9 % (ref 40–73)
NITRITE UR QL STRIP.AUTO: NEGATIVE
NRBC # BLD: 0 K/UL (ref 0–0.01)
NRBC BLD-RTO: 0 PER 100 WBC
PH UR STRIP: 5 (ref 5–8)
PLATELET # BLD AUTO: 203 K/UL (ref 135–420)
PMV BLD AUTO: 10.6 FL (ref 9.2–11.8)
POTASSIUM SERPL-SCNC: 4.8 MMOL/L (ref 3.5–5.5)
PROT SERPL-MCNC: 7 G/DL (ref 6.4–8.2)
PROT UR STRIP-MCNC: NEGATIVE MG/DL
RBC # BLD AUTO: 4.69 M/UL (ref 4.35–5.65)
SODIUM SERPL-SCNC: 139 MMOL/L (ref 136–145)
SP GR UR REFRACTOMETRY: >1.03 (ref 1–1.04)
TRIGL SERPL-MCNC: 169 MG/DL (ref 0–150)
TSH, 3RD GENERATION: 2.54 UIU/ML (ref 0.27–4.2)
UROBILINOGEN UR QL STRIP.AUTO: 0.2 EU/DL (ref 0.2–1)
VLDLC SERPL CALC-MCNC: 34 MG/DL
WBC # BLD AUTO: 9.1 K/UL (ref 4.6–13.2)

## 2025-07-30 PROCEDURE — 83036 HEMOGLOBIN GLYCOSYLATED A1C: CPT

## 2025-07-30 PROCEDURE — 82043 UR ALBUMIN QUANTITATIVE: CPT

## 2025-07-30 PROCEDURE — 81003 URINALYSIS AUTO W/O SCOPE: CPT

## 2025-07-30 PROCEDURE — 85025 COMPLETE CBC W/AUTO DIFF WBC: CPT

## 2025-07-30 PROCEDURE — 82570 ASSAY OF URINE CREATININE: CPT

## 2025-07-30 PROCEDURE — 80061 LIPID PANEL: CPT

## 2025-07-30 PROCEDURE — 80053 COMPREHEN METABOLIC PANEL: CPT

## 2025-07-30 PROCEDURE — 84443 ASSAY THYROID STIM HORMONE: CPT

## 2025-07-30 PROCEDURE — 36415 COLL VENOUS BLD VENIPUNCTURE: CPT

## 2025-07-31 DIAGNOSIS — I10 ESSENTIAL HYPERTENSION: ICD-10-CM

## 2025-07-31 RX ORDER — AMLODIPINE BESYLATE 5 MG/1
5 TABLET ORAL DAILY
Qty: 90 TABLET | Refills: 3 | Status: SHIPPED | OUTPATIENT
Start: 2025-07-31

## 2025-07-31 NOTE — TELEPHONE ENCOUNTER
Last refilled 4/23/24 for 90 with 3 refills.last ov 1/16/25   Future Appointments   Date Time Provider Department Center   8/6/2025  9:00 AM [facility]   11/18/2025  8:50 AM [facility] NURSE [facility] [place] Sched   11/25/2025  8:40 AM Natalia Talbot PA-C [facility] [place] Sched

## 2025-08-06 ENCOUNTER — TELEPHONE (OUTPATIENT)
Dept: FAMILY MEDICINE CLINIC | Facility: CLINIC | Age: 75
End: 2025-08-06

## 2025-08-06 ENCOUNTER — OFFICE VISIT (OUTPATIENT)
Dept: FAMILY MEDICINE CLINIC | Facility: CLINIC | Age: 75
End: 2025-08-06
Payer: MEDICARE

## 2025-08-06 VITALS
HEIGHT: 72 IN | RESPIRATION RATE: 16 BRPM | WEIGHT: 240 LBS | TEMPERATURE: 98.2 F | DIASTOLIC BLOOD PRESSURE: 60 MMHG | BODY MASS INDEX: 32.51 KG/M2 | SYSTOLIC BLOOD PRESSURE: 118 MMHG | OXYGEN SATURATION: 94 % | HEART RATE: 58 BPM
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
## 2025-08-06 DIAGNOSIS — E11.65 HYPERGLYCEMIA DUE TO DIABETES MELLITUS (HCC): ICD-10-CM

## 2025-08-06 DIAGNOSIS — I25.10 CORONARY ARTERY DISEASE INVOLVING NATIVE CORONARY ARTERY OF NATIVE HEART WITHOUT ANGINA PECTORIS: ICD-10-CM

## 2025-08-06 DIAGNOSIS — N52.37: ICD-10-CM

## 2025-08-06 DIAGNOSIS — F41.0 PANIC DISORDER (EPISODIC PAROXYSMAL ANXIETY): ICD-10-CM

## 2025-08-06 DIAGNOSIS — Z98.890 S/P AAA REPAIR: ICD-10-CM

## 2025-08-06 DIAGNOSIS — E11.42 TYPE 2 DIABETES MELLITUS WITH DIABETIC POLYNEUROPATHY, WITHOUT LONG-TERM CURRENT USE OF INSULIN (HCC): Primary | ICD-10-CM

## 2025-08-06 DIAGNOSIS — Z86.79 S/P AAA REPAIR: ICD-10-CM

## 2025-08-06 DIAGNOSIS — I10 ESSENTIAL HYPERTENSION: ICD-10-CM

## 2025-08-06 DIAGNOSIS — Z00.00 MEDICARE ANNUAL WELLNESS VISIT, SUBSEQUENT: ICD-10-CM

## 2025-08-06 DIAGNOSIS — E78.00 HYPERCHOLESTEROLEMIA: ICD-10-CM

## 2025-08-06 PROCEDURE — 3078F DIAST BP <80 MM HG: CPT | Performed by: FAMILY MEDICINE

## 2025-08-06 PROCEDURE — 3074F SYST BP LT 130 MM HG: CPT | Performed by: FAMILY MEDICINE

## 2025-08-06 PROCEDURE — 1160F RVW MEDS BY RX/DR IN RCRD: CPT | Performed by: FAMILY MEDICINE

## 2025-08-06 PROCEDURE — 1126F AMNT PAIN NOTED NONE PRSNT: CPT | Performed by: FAMILY MEDICINE

## 2025-08-06 PROCEDURE — G8417 CALC BMI ABV UP PARAM F/U: HCPCS | Performed by: FAMILY MEDICINE

## 2025-08-06 PROCEDURE — 99214 OFFICE O/P EST MOD 30 MIN: CPT | Performed by: FAMILY MEDICINE

## 2025-08-06 PROCEDURE — 1159F MED LIST DOCD IN RCRD: CPT | Performed by: FAMILY MEDICINE

## 2025-08-06 PROCEDURE — G0439 PPPS, SUBSEQ VISIT: HCPCS | Performed by: FAMILY MEDICINE

## 2025-08-06 PROCEDURE — G8427 DOCREV CUR MEDS BY ELIG CLIN: HCPCS | Performed by: FAMILY MEDICINE

## 2025-08-06 PROCEDURE — 3052F HG A1C>EQUAL 8.0%<EQUAL 9.0%: CPT | Performed by: FAMILY MEDICINE

## 2025-08-06 PROCEDURE — 1123F ACP DISCUSS/DSCN MKR DOCD: CPT | Performed by: FAMILY MEDICINE

## 2025-08-06 PROCEDURE — 1036F TOBACCO NON-USER: CPT | Performed by: FAMILY MEDICINE

## 2025-08-06 RX ORDER — SILDENAFIL 100 MG/1
TABLET, FILM COATED ORAL
Qty: 30 TABLET | Refills: 5 | Status: SHIPPED | OUTPATIENT
Start: 2025-08-06

## 2025-08-06 RX ORDER — ALPRAZOLAM 0.25 MG
TABLET ORAL
Qty: 20 TABLET | Refills: 1 | Status: SHIPPED | OUTPATIENT
Start: 2025-08-06 | End: 2026-02-01

## 2025-08-06 ASSESSMENT — ENCOUNTER SYMPTOMS
CHEST TIGHTNESS: 0
COUGH: 0
SHORTNESS OF BREATH: 0
NAUSEA: 0
DIARRHEA: 0
VOMITING: 0
WHEEZING: 0
BLOOD IN STOOL: 0
ABDOMINAL PAIN: 0

## 2025-08-06 ASSESSMENT — PATIENT HEALTH QUESTIONNAIRE - PHQ9
SUM OF ALL RESPONSES TO PHQ QUESTIONS 1-9: 0
2. FEELING DOWN, DEPRESSED OR HOPELESS: NOT AT ALL
1. LITTLE INTEREST OR PLEASURE IN DOING THINGS: NOT AT ALL
SUM OF ALL RESPONSES TO PHQ QUESTIONS 1-9: 0
